# Patient Record
Sex: FEMALE | Race: WHITE | Employment: OTHER | URBAN - METROPOLITAN AREA
[De-identification: names, ages, dates, MRNs, and addresses within clinical notes are randomized per-mention and may not be internally consistent; named-entity substitution may affect disease eponyms.]

---

## 2017-01-11 ENCOUNTER — HOSPITAL ENCOUNTER (OUTPATIENT)
Dept: OTHER | Age: 82
Discharge: HOME | End: 2017-01-11
Attending: FAMILY MEDICINE | Admitting: FAMILY MEDICINE

## 2017-01-11 LAB
ANION GAP SERPL CALCULATED.3IONS-SCNC: 17 MMOL/L (ref 3–16)
BUN BLDV-MCNC: 17 MG/DL (ref 7–20)
CALCIUM SERPL-MCNC: 10.2 MG/DL (ref 8.3–10.6)
CHLORIDE BLD-SCNC: 99 MMOL/L (ref 99–110)
CO2: 25 MMOL/L (ref 21–32)
CREAT SERPL-MCNC: 0.8 MG/DL (ref 0.6–1.2)
GFR AFRICAN AMERICAN: >60
GFR NON-AFRICAN AMERICAN: >60
GLUCOSE BLD-MCNC: 110 MG/DL (ref 70–99)
POTASSIUM SERPL-SCNC: 5.1 MMOL/L (ref 3.5–5.1)
SODIUM BLD-SCNC: 141 MMOL/L (ref 136–145)

## 2017-01-13 ENCOUNTER — OFFICE VISIT (OUTPATIENT)
Dept: FAMILY MEDICINE CLINIC | Age: 82
End: 2017-01-13

## 2017-01-13 VITALS
SYSTOLIC BLOOD PRESSURE: 104 MMHG | OXYGEN SATURATION: 98 % | WEIGHT: 100.38 LBS | BODY MASS INDEX: 18.97 KG/M2 | DIASTOLIC BLOOD PRESSURE: 64 MMHG | RESPIRATION RATE: 12 BRPM | HEART RATE: 80 BPM

## 2017-01-13 DIAGNOSIS — I95.1 ORTHOSTATIC HYPOTENSION: Primary | ICD-10-CM

## 2017-01-13 DIAGNOSIS — E87.5 HYPERKALEMIA: ICD-10-CM

## 2017-01-13 PROCEDURE — 99213 OFFICE O/P EST LOW 20 MIN: CPT | Performed by: FAMILY MEDICINE

## 2017-01-19 ENCOUNTER — TELEPHONE (OUTPATIENT)
Dept: FAMILY MEDICINE CLINIC | Age: 82
End: 2017-01-19

## 2017-01-19 RX ORDER — MIDODRINE HYDROCHLORIDE 2.5 MG/1
2.5 TABLET ORAL 3 TIMES DAILY
Qty: 90 TABLET | Refills: 5 | Status: SHIPPED | OUTPATIENT
Start: 2017-01-19 | End: 2017-02-14

## 2017-01-23 ENCOUNTER — PROCEDURE VISIT (OUTPATIENT)
Dept: CARDIOLOGY CLINIC | Age: 82
End: 2017-01-23

## 2017-01-23 ENCOUNTER — OFFICE VISIT (OUTPATIENT)
Dept: CARDIOLOGY CLINIC | Age: 82
End: 2017-01-23

## 2017-01-23 VITALS
BODY MASS INDEX: 18.69 KG/M2 | DIASTOLIC BLOOD PRESSURE: 72 MMHG | SYSTOLIC BLOOD PRESSURE: 110 MMHG | HEART RATE: 54 BPM | OXYGEN SATURATION: 96 % | HEIGHT: 61 IN | WEIGHT: 99 LBS

## 2017-01-23 DIAGNOSIS — Z95.0 PACEMAKER: ICD-10-CM

## 2017-01-23 DIAGNOSIS — I95.1 ORTHOSTATIC HYPOTENSION: ICD-10-CM

## 2017-01-23 DIAGNOSIS — I48.0 PAROXYSMAL ATRIAL FIBRILLATION (HCC): ICD-10-CM

## 2017-01-23 DIAGNOSIS — I49.5 SINUS NODE DYSFUNCTION (HCC): ICD-10-CM

## 2017-01-23 DIAGNOSIS — R56.9 SEIZURE (HCC): ICD-10-CM

## 2017-01-23 DIAGNOSIS — R00.1 SYMPTOMATIC BRADYCARDIA: Primary | ICD-10-CM

## 2017-01-23 DIAGNOSIS — R55 SYNCOPE, UNSPECIFIED SYNCOPE TYPE: ICD-10-CM

## 2017-01-23 PROCEDURE — 93280 PM DEVICE PROGR EVAL DUAL: CPT | Performed by: INTERNAL MEDICINE

## 2017-01-23 PROCEDURE — 99214 OFFICE O/P EST MOD 30 MIN: CPT | Performed by: INTERNAL MEDICINE

## 2017-01-24 ENCOUNTER — OFFICE VISIT (OUTPATIENT)
Dept: FAMILY MEDICINE CLINIC | Age: 82
End: 2017-01-24

## 2017-01-24 ENCOUNTER — HOSPITAL ENCOUNTER (OUTPATIENT)
Dept: OTHER | Age: 82
Discharge: HOME | End: 2017-01-24
Attending: PHYSICIAN ASSISTANT | Admitting: PHYSICIAN ASSISTANT

## 2017-01-24 VITALS
WEIGHT: 99 LBS | BODY MASS INDEX: 18.71 KG/M2 | DIASTOLIC BLOOD PRESSURE: 80 MMHG | SYSTOLIC BLOOD PRESSURE: 120 MMHG | OXYGEN SATURATION: 95 % | TEMPERATURE: 97.1 F

## 2017-01-24 DIAGNOSIS — R51.9 NONINTRACTABLE EPISODIC HEADACHE, UNSPECIFIED HEADACHE TYPE: ICD-10-CM

## 2017-01-24 DIAGNOSIS — R42 DIZZINESS: ICD-10-CM

## 2017-01-24 DIAGNOSIS — I48.0 PAROXYSMAL ATRIAL FIBRILLATION (HCC): ICD-10-CM

## 2017-01-24 DIAGNOSIS — I95.9 HYPOTENSION, UNSPECIFIED HYPOTENSION TYPE: ICD-10-CM

## 2017-01-24 DIAGNOSIS — R41.3 SHORT-TERM MEMORY LOSS: ICD-10-CM

## 2017-01-24 DIAGNOSIS — J40 BRONCHITIS: Primary | ICD-10-CM

## 2017-01-24 LAB
KEPPRA DOSE AMT: ABNORMAL
KEPPRA: >100 UG/ML (ref 6–46)

## 2017-01-24 PROCEDURE — 99214 OFFICE O/P EST MOD 30 MIN: CPT | Performed by: PHYSICIAN ASSISTANT

## 2017-01-24 RX ORDER — CEPHALEXIN 500 MG/1
500 CAPSULE ORAL 3 TIMES DAILY
Qty: 30 CAPSULE | Refills: 0 | Status: SHIPPED | OUTPATIENT
Start: 2017-01-24 | End: 2017-02-14

## 2017-01-24 ASSESSMENT — ENCOUNTER SYMPTOMS
NAUSEA: 0
TROUBLE SWALLOWING: 0
SHORTNESS OF BREATH: 1
SINUS PRESSURE: 0
SORE THROAT: 0
COUGH: 1
DIARRHEA: 0
VOMITING: 0
RHINORRHEA: 0
WHEEZING: 0

## 2017-01-26 ENCOUNTER — TELEPHONE (OUTPATIENT)
Dept: NEUROLOGY | Age: 82
End: 2017-01-26

## 2017-01-27 ENCOUNTER — TELEPHONE (OUTPATIENT)
Dept: FAMILY MEDICINE CLINIC | Age: 82
End: 2017-01-27

## 2017-01-27 ENCOUNTER — OFFICE VISIT (OUTPATIENT)
Dept: NEUROLOGY | Age: 82
End: 2017-01-27

## 2017-01-27 ENCOUNTER — HOSPITAL ENCOUNTER (OUTPATIENT)
Dept: OTHER | Age: 82
Discharge: HOME | End: 2017-01-27
Attending: PSYCHIATRY & NEUROLOGY | Admitting: PSYCHIATRY & NEUROLOGY

## 2017-01-27 VITALS
HEART RATE: 85 BPM | DIASTOLIC BLOOD PRESSURE: 72 MMHG | SYSTOLIC BLOOD PRESSURE: 125 MMHG | WEIGHT: 101 LBS | BODY MASS INDEX: 19.07 KG/M2 | HEIGHT: 61 IN

## 2017-01-27 DIAGNOSIS — R56.9 PARTIAL SEIZURES (HCC): Primary | ICD-10-CM

## 2017-01-27 DIAGNOSIS — R89.9 ABNORMAL LABORATORY TEST RESULT: ICD-10-CM

## 2017-01-27 DIAGNOSIS — G44.52 NEW DAILY PERSISTENT HEADACHE: ICD-10-CM

## 2017-01-27 DIAGNOSIS — Z95.0 HISTORY OF PERMANENT CARDIAC PACEMAKER PLACEMENT: Primary | ICD-10-CM

## 2017-01-27 LAB
C-REACTIVE PROTEIN: 18 MG/L (ref 0–5.1)
KEPPRA DOSE AMT: NORMAL
KEPPRA: 7.2 UG/ML (ref 6–46)
SEDIMENTATION RATE, ERYTHROCYTE: 68 MM/HR (ref 0–30)

## 2017-01-27 PROCEDURE — 99214 OFFICE O/P EST MOD 30 MIN: CPT | Performed by: PSYCHIATRY & NEUROLOGY

## 2017-01-27 RX ORDER — ALPRAZOLAM 0.5 MG/1
TABLET ORAL
Qty: 2 TABLET | Refills: 0 | OUTPATIENT
Start: 2017-01-27 | End: 2017-02-14

## 2017-01-30 DIAGNOSIS — G40.119 PARTIAL EPILEPSY WITH INTRACTABLE EPILEPSY (HCC): Primary | ICD-10-CM

## 2017-02-02 ENCOUNTER — TELEPHONE (OUTPATIENT)
Dept: FAMILY MEDICINE CLINIC | Age: 82
End: 2017-02-02

## 2017-02-03 ENCOUNTER — HOSPITAL ENCOUNTER (OUTPATIENT)
Dept: OTHER | Age: 82
Discharge: HOME | End: 2017-02-03
Attending: PSYCHIATRY & NEUROLOGY | Admitting: PSYCHIATRY & NEUROLOGY

## 2017-02-03 ENCOUNTER — HOSPITAL ENCOUNTER (OUTPATIENT)
Dept: MRI IMAGING | Age: 82
Discharge: HOME | End: 2017-02-03
Attending: PHYSICIAN ASSISTANT | Admitting: PHYSICIAN ASSISTANT

## 2017-02-03 DIAGNOSIS — R51.9 NONINTRACTABLE EPISODIC HEADACHE, UNSPECIFIED HEADACHE TYPE: ICD-10-CM

## 2017-02-03 DIAGNOSIS — R41.3 SHORT-TERM MEMORY LOSS: ICD-10-CM

## 2017-02-03 DIAGNOSIS — R41.3 OTHER AMNESIA: ICD-10-CM

## 2017-02-03 DIAGNOSIS — R41.3 AMNESIA: ICD-10-CM

## 2017-02-03 DIAGNOSIS — R42 DIZZINESS: ICD-10-CM

## 2017-02-03 LAB
C-REACTIVE PROTEIN: 1.4 MG/L (ref 0–5.1)
SEDIMENTATION RATE, ERYTHROCYTE: 33 MM/HR (ref 0–30)

## 2017-02-06 ENCOUNTER — OFFICE VISIT (OUTPATIENT)
Dept: NEUROLOGY | Age: 82
End: 2017-02-06

## 2017-02-06 VITALS
DIASTOLIC BLOOD PRESSURE: 71 MMHG | BODY MASS INDEX: 19.07 KG/M2 | HEART RATE: 72 BPM | SYSTOLIC BLOOD PRESSURE: 131 MMHG | WEIGHT: 101 LBS | HEIGHT: 61 IN

## 2017-02-06 DIAGNOSIS — G40.119 PARTIAL EPILEPSY WITH INTRACTABLE EPILEPSY (HCC): Primary | ICD-10-CM

## 2017-02-06 PROCEDURE — 99213 OFFICE O/P EST LOW 20 MIN: CPT | Performed by: PSYCHIATRY & NEUROLOGY

## 2017-02-14 ENCOUNTER — OFFICE VISIT (OUTPATIENT)
Dept: FAMILY MEDICINE CLINIC | Age: 82
End: 2017-02-14

## 2017-02-14 VITALS — WEIGHT: 98 LBS | DIASTOLIC BLOOD PRESSURE: 70 MMHG | BODY MASS INDEX: 18.52 KG/M2 | SYSTOLIC BLOOD PRESSURE: 110 MMHG

## 2017-02-14 DIAGNOSIS — I95.1 ORTHOSTATIC HYPOTENSION: Primary | ICD-10-CM

## 2017-02-14 DIAGNOSIS — J40 BRONCHITIS: ICD-10-CM

## 2017-02-14 PROCEDURE — 99213 OFFICE O/P EST LOW 20 MIN: CPT | Performed by: PHYSICIAN ASSISTANT

## 2017-02-14 RX ORDER — MIDODRINE HYDROCHLORIDE 2.5 MG/1
2.5 TABLET ORAL DAILY
COMMUNITY
End: 2017-07-25

## 2017-02-14 ASSESSMENT — ENCOUNTER SYMPTOMS
COUGH: 0
BACK PAIN: 0
ABDOMINAL PAIN: 0
SHORTNESS OF BREATH: 0

## 2017-02-15 ENCOUNTER — HOSPITAL ENCOUNTER (OUTPATIENT)
Dept: OTHER | Age: 82
Discharge: HOME | End: 2017-02-15
Attending: PSYCHIATRY & NEUROLOGY | Admitting: PSYCHIATRY & NEUROLOGY

## 2017-02-15 DIAGNOSIS — G40.119 PARTIAL EPILEPSY WITH INTRACTABLE EPILEPSY (HCC): ICD-10-CM

## 2017-02-15 LAB
KEPPRA DOSE AMT: NORMAL
KEPPRA: 42.2 UG/ML (ref 6–46)

## 2017-04-07 ENCOUNTER — OFFICE VISIT (OUTPATIENT)
Dept: FAMILY MEDICINE CLINIC | Age: 82
End: 2017-04-07

## 2017-04-07 ENCOUNTER — HOSPITAL ENCOUNTER (OUTPATIENT)
Dept: OTHER | Age: 82
Discharge: HOME | End: 2017-04-07
Attending: FAMILY MEDICINE | Admitting: FAMILY MEDICINE

## 2017-04-07 VITALS
BODY MASS INDEX: 19.01 KG/M2 | WEIGHT: 100.6 LBS | HEART RATE: 75 BPM | OXYGEN SATURATION: 97 % | DIASTOLIC BLOOD PRESSURE: 80 MMHG | SYSTOLIC BLOOD PRESSURE: 118 MMHG

## 2017-04-07 DIAGNOSIS — I95.1 ORTHOSTATIC HYPOTENSION: Primary | ICD-10-CM

## 2017-04-07 DIAGNOSIS — E03.9 ACQUIRED HYPOTHYROIDISM: ICD-10-CM

## 2017-04-07 LAB
ANION GAP SERPL CALCULATED.3IONS-SCNC: 14 MMOL/L (ref 3–16)
BUN BLDV-MCNC: 15 MG/DL (ref 7–20)
CALCIUM SERPL-MCNC: 9.9 MG/DL (ref 8.3–10.6)
CHLORIDE BLD-SCNC: 100 MMOL/L (ref 99–110)
CO2: 26 MMOL/L (ref 21–32)
CORTISOL - AM: 17.7 UG/DL (ref 4.3–22.4)
CREAT SERPL-MCNC: 0.7 MG/DL (ref 0.6–1.2)
GFR AFRICAN AMERICAN: >60
GFR NON-AFRICAN AMERICAN: >60
GLUCOSE BLD-MCNC: 98 MG/DL (ref 70–99)
POTASSIUM SERPL-SCNC: 4.6 MMOL/L (ref 3.5–5.1)
SODIUM BLD-SCNC: 140 MMOL/L (ref 136–145)

## 2017-04-07 PROCEDURE — 99213 OFFICE O/P EST LOW 20 MIN: CPT | Performed by: FAMILY MEDICINE

## 2017-04-07 RX ORDER — LEVOTHYROXINE SODIUM 0.05 MG/1
TABLET ORAL
Qty: 90 TABLET | Refills: 3 | Status: SHIPPED | OUTPATIENT
Start: 2017-04-07 | End: 2018-06-28 | Stop reason: SDUPTHER

## 2017-05-08 ENCOUNTER — OFFICE VISIT (OUTPATIENT)
Dept: NEUROLOGY | Age: 82
End: 2017-05-08

## 2017-05-08 VITALS
DIASTOLIC BLOOD PRESSURE: 74 MMHG | BODY MASS INDEX: 19.11 KG/M2 | WEIGHT: 101.2 LBS | SYSTOLIC BLOOD PRESSURE: 135 MMHG | HEART RATE: 74 BPM | HEIGHT: 61 IN

## 2017-05-08 DIAGNOSIS — G40.119 PARTIAL EPILEPSY WITH INTRACTABLE EPILEPSY (HCC): Primary | ICD-10-CM

## 2017-05-08 PROCEDURE — 99213 OFFICE O/P EST LOW 20 MIN: CPT | Performed by: PSYCHIATRY & NEUROLOGY

## 2017-05-22 ENCOUNTER — OFFICE VISIT (OUTPATIENT)
Dept: FAMILY MEDICINE CLINIC | Age: 82
End: 2017-05-22

## 2017-05-22 VITALS
SYSTOLIC BLOOD PRESSURE: 132 MMHG | WEIGHT: 102.5 LBS | DIASTOLIC BLOOD PRESSURE: 78 MMHG | OXYGEN SATURATION: 98 % | BODY MASS INDEX: 19.37 KG/M2 | RESPIRATION RATE: 12 BRPM | HEART RATE: 50 BPM

## 2017-05-22 DIAGNOSIS — M79.605 BILATERAL LEG PAIN: ICD-10-CM

## 2017-05-22 DIAGNOSIS — M79.604 BILATERAL LEG PAIN: ICD-10-CM

## 2017-05-22 DIAGNOSIS — S16.1XXA CERVICAL STRAIN, INITIAL ENCOUNTER: Primary | ICD-10-CM

## 2017-05-22 PROCEDURE — 99213 OFFICE O/P EST LOW 20 MIN: CPT | Performed by: FAMILY MEDICINE

## 2017-05-22 RX ORDER — MELOXICAM 7.5 MG/1
7.5 TABLET ORAL DAILY PRN
Qty: 30 TABLET | Refills: 0 | Status: SHIPPED | OUTPATIENT
Start: 2017-05-22 | End: 2017-07-24 | Stop reason: ALTCHOICE

## 2017-05-25 ENCOUNTER — HOSPITAL ENCOUNTER (OUTPATIENT)
Dept: OTHER | Age: 82
Discharge: HOME | End: 2017-05-25
Attending: FAMILY MEDICINE | Admitting: FAMILY MEDICINE

## 2017-05-25 DIAGNOSIS — M79.604 BILATERAL LEG PAIN: ICD-10-CM

## 2017-05-25 DIAGNOSIS — M79.605 BILATERAL LEG PAIN: ICD-10-CM

## 2017-05-25 LAB
C-REACTIVE PROTEIN: <0.3 MG/L (ref 0–5.1)
SEDIMENTATION RATE, ERYTHROCYTE: 13 MM/HR (ref 0–30)

## 2017-06-12 ENCOUNTER — HOSPITAL ENCOUNTER (OUTPATIENT)
Dept: PHYSICAL THERAPY | Age: 82
Discharge: HOME | End: 2017-06-30
Attending: FAMILY MEDICINE | Admitting: FAMILY MEDICINE

## 2017-06-12 ASSESSMENT — PAIN SCALES - GENERAL: PAINLEVEL_OUTOF10: 1

## 2017-06-15 RX ORDER — LEVETIRACETAM 750 MG/1
TABLET ORAL
Qty: 360 TABLET | Refills: 3 | Status: SHIPPED | OUTPATIENT
Start: 2017-06-15 | End: 2017-07-25 | Stop reason: SDUPTHER

## 2017-07-18 ENCOUNTER — PROCEDURE VISIT (OUTPATIENT)
Dept: CARDIOLOGY CLINIC | Age: 82
End: 2017-07-18

## 2017-07-18 ENCOUNTER — OFFICE VISIT (OUTPATIENT)
Dept: CARDIOLOGY CLINIC | Age: 82
End: 2017-07-18

## 2017-07-18 VITALS
SYSTOLIC BLOOD PRESSURE: 110 MMHG | HEIGHT: 61 IN | BODY MASS INDEX: 18.69 KG/M2 | DIASTOLIC BLOOD PRESSURE: 70 MMHG | OXYGEN SATURATION: 97 % | HEART RATE: 84 BPM | WEIGHT: 99 LBS

## 2017-07-18 DIAGNOSIS — Z95.0 PACEMAKER: ICD-10-CM

## 2017-07-18 DIAGNOSIS — R56.9 SEIZURE (HCC): ICD-10-CM

## 2017-07-18 DIAGNOSIS — I48.0 PAROXYSMAL ATRIAL FIBRILLATION (HCC): Primary | ICD-10-CM

## 2017-07-18 DIAGNOSIS — I49.5 SINUS NODE DYSFUNCTION (HCC): ICD-10-CM

## 2017-07-18 DIAGNOSIS — R00.1 SYMPTOMATIC BRADYCARDIA: ICD-10-CM

## 2017-07-18 DIAGNOSIS — I95.1 ORTHOSTATIC HYPOTENSION: ICD-10-CM

## 2017-07-18 PROCEDURE — 99214 OFFICE O/P EST MOD 30 MIN: CPT | Performed by: INTERNAL MEDICINE

## 2017-07-18 PROCEDURE — 93280 PM DEVICE PROGR EVAL DUAL: CPT | Performed by: INTERNAL MEDICINE

## 2017-07-20 ENCOUNTER — PATIENT MESSAGE (OUTPATIENT)
Dept: FAMILY MEDICINE CLINIC | Age: 82
End: 2017-07-20

## 2017-07-24 ENCOUNTER — TELEPHONE (OUTPATIENT)
Dept: FAMILY MEDICINE CLINIC | Age: 82
End: 2017-07-24

## 2017-07-25 ENCOUNTER — OFFICE VISIT (OUTPATIENT)
Dept: FAMILY MEDICINE CLINIC | Age: 82
End: 2017-07-25

## 2017-07-25 VITALS
SYSTOLIC BLOOD PRESSURE: 122 MMHG | OXYGEN SATURATION: 98 % | DIASTOLIC BLOOD PRESSURE: 70 MMHG | HEART RATE: 98 BPM | BODY MASS INDEX: 18.48 KG/M2 | WEIGHT: 97.8 LBS

## 2017-07-25 DIAGNOSIS — I10 ESSENTIAL HYPERTENSION: ICD-10-CM

## 2017-07-25 DIAGNOSIS — D64.9 ANEMIA, UNSPECIFIED TYPE: ICD-10-CM

## 2017-07-25 DIAGNOSIS — R63.0 ANOREXIA: ICD-10-CM

## 2017-07-25 DIAGNOSIS — F32.9 REACTIVE DEPRESSION: ICD-10-CM

## 2017-07-25 DIAGNOSIS — I95.1 ORTHOSTATIC HYPOTENSION: Primary | ICD-10-CM

## 2017-07-25 DIAGNOSIS — G40.119 PARTIAL EPILEPSY WITH INTRACTABLE EPILEPSY (HCC): ICD-10-CM

## 2017-07-25 PROCEDURE — 99213 OFFICE O/P EST LOW 20 MIN: CPT | Performed by: FAMILY MEDICINE

## 2017-07-25 RX ORDER — LEVETIRACETAM 750 MG/1
750 TABLET ORAL 2 TIMES DAILY
Qty: 360 TABLET | Refills: 3
Start: 2017-07-25 | End: 2019-01-01 | Stop reason: DRUGHIGH

## 2017-07-25 RX ORDER — PAROXETINE 10 MG/1
10 TABLET, FILM COATED ORAL DAILY
Qty: 30 TABLET | Refills: 3 | Status: SHIPPED | OUTPATIENT
Start: 2017-07-25 | End: 2017-11-13 | Stop reason: ALTCHOICE

## 2017-07-25 RX ORDER — MIDODRINE HYDROCHLORIDE 2.5 MG/1
TABLET ORAL
Qty: 60 TABLET | Refills: 3 | Status: SHIPPED | OUTPATIENT
Start: 2017-07-25 | End: 2017-08-07 | Stop reason: SDUPTHER

## 2017-08-02 ENCOUNTER — HOSPITAL ENCOUNTER (OUTPATIENT)
Dept: OTHER | Age: 82
Discharge: HOME | End: 2017-08-02
Attending: FAMILY MEDICINE | Admitting: FAMILY MEDICINE

## 2017-08-02 DIAGNOSIS — G40.119 PARTIAL EPILEPSY WITH INTRACTABLE EPILEPSY (HCC): ICD-10-CM

## 2017-08-02 DIAGNOSIS — D64.9 ANEMIA, UNSPECIFIED TYPE: ICD-10-CM

## 2017-08-02 LAB
FOLATE: >20 NG/ML (ref 4.78–24.2)
KEPPRA DOSE AMT: NORMAL
KEPPRA: 22.9 UG/ML (ref 6–46)
VITAMIN B-12: 1067 PG/ML (ref 211–911)

## 2017-08-07 ENCOUNTER — OFFICE VISIT (OUTPATIENT)
Dept: FAMILY MEDICINE CLINIC | Age: 82
End: 2017-08-07

## 2017-08-07 VITALS
SYSTOLIC BLOOD PRESSURE: 148 MMHG | OXYGEN SATURATION: 97 % | WEIGHT: 98 LBS | HEART RATE: 86 BPM | BODY MASS INDEX: 18.52 KG/M2 | DIASTOLIC BLOOD PRESSURE: 82 MMHG

## 2017-08-07 DIAGNOSIS — I95.1 ORTHOSTATIC HYPOTENSION: ICD-10-CM

## 2017-08-07 DIAGNOSIS — R91.1 LUNG NODULE: ICD-10-CM

## 2017-08-07 DIAGNOSIS — M50.30 DDD (DEGENERATIVE DISC DISEASE), CERVICAL: Primary | ICD-10-CM

## 2017-08-07 PROCEDURE — 99213 OFFICE O/P EST LOW 20 MIN: CPT | Performed by: FAMILY MEDICINE

## 2017-08-07 RX ORDER — PREDNISONE 20 MG/1
40 TABLET ORAL DAILY
Qty: 14 TABLET | Refills: 0 | Status: SHIPPED | OUTPATIENT
Start: 2017-08-07 | End: 2017-08-14 | Stop reason: ALTCHOICE

## 2017-08-07 RX ORDER — MIDODRINE HYDROCHLORIDE 2.5 MG/1
2.5 TABLET ORAL 3 TIMES DAILY
Qty: 90 TABLET | Refills: 3 | Status: SHIPPED | OUTPATIENT
Start: 2017-08-07 | End: 2018-08-21 | Stop reason: SDUPTHER

## 2017-08-14 ENCOUNTER — OFFICE VISIT (OUTPATIENT)
Dept: FAMILY MEDICINE CLINIC | Age: 82
End: 2017-08-14

## 2017-08-14 VITALS
HEART RATE: 80 BPM | SYSTOLIC BLOOD PRESSURE: 142 MMHG | BODY MASS INDEX: 18.52 KG/M2 | OXYGEN SATURATION: 98 % | WEIGHT: 98 LBS | DIASTOLIC BLOOD PRESSURE: 90 MMHG

## 2017-08-14 DIAGNOSIS — S40.021A HEMATOMA OF ARM, RIGHT, INITIAL ENCOUNTER: ICD-10-CM

## 2017-08-14 DIAGNOSIS — R35.0 FREQUENT URINATION: Primary | ICD-10-CM

## 2017-08-14 DIAGNOSIS — I95.1 ORTHOSTATIC HYPOTENSION: ICD-10-CM

## 2017-08-14 LAB
BACTERIA URINE, POC: 0
BILIRUBIN URINE: 0 MG/DL
BLOOD, URINE: POSITIVE
CASTS URINE, POC: 0
CLARITY: CLEAR
COLOR: YELLOW
CRYSTALS URINE, POC: 0
EPI CELLS URINE, POC: 0
GLUCOSE URINE: NEGATIVE
KETONES, URINE: NEGATIVE
LEUKOCYTE EST, POC: NORMAL
NITRITE, URINE: NEGATIVE
PH UA: 6 (ref 4.5–8)
PROTEIN UA: NEGATIVE
RBC URINE, POC: NORMAL
SPECIFIC GRAVITY UA: 1 (ref 1–1.03)
UROBILINOGEN, URINE: NORMAL
WBC URINE, POC: NORMAL
YEAST URINE, POC: 0

## 2017-08-14 PROCEDURE — 99213 OFFICE O/P EST LOW 20 MIN: CPT | Performed by: FAMILY MEDICINE

## 2017-08-14 PROCEDURE — 81000 URINALYSIS NONAUTO W/SCOPE: CPT | Performed by: FAMILY MEDICINE

## 2017-08-16 LAB — URINE CULTURE, ROUTINE: NORMAL

## 2017-08-18 ENCOUNTER — HOSPITAL ENCOUNTER (OUTPATIENT)
Dept: CT IMAGING | Age: 82
Discharge: HOME | End: 2017-08-18
Attending: FAMILY MEDICINE | Admitting: FAMILY MEDICINE

## 2017-08-18 ENCOUNTER — TELEPHONE (OUTPATIENT)
Dept: FAMILY MEDICINE CLINIC | Age: 82
End: 2017-08-18

## 2017-08-18 DIAGNOSIS — R91.1 LUNG NODULE: ICD-10-CM

## 2017-08-18 DIAGNOSIS — R91.1 SOLITARY PULMONARY NODULE: ICD-10-CM

## 2017-08-18 RX ORDER — LEVOFLOXACIN 500 MG/1
500 TABLET, FILM COATED ORAL DAILY
Qty: 10 TABLET | Refills: 0 | Status: ON HOLD | OUTPATIENT
Start: 2017-08-18 | End: 2017-08-25 | Stop reason: HOSPADM

## 2017-08-23 ENCOUNTER — TELEPHONE (OUTPATIENT)
Dept: FAMILY MEDICINE CLINIC | Age: 82
End: 2017-08-23

## 2017-08-29 PROBLEM — R13.10 DYSPHAGIA: Status: ACTIVE | Noted: 2017-08-29

## 2017-09-01 ENCOUNTER — OFFICE VISIT (OUTPATIENT)
Dept: FAMILY MEDICINE CLINIC | Age: 82
End: 2017-09-01

## 2017-09-01 VITALS
OXYGEN SATURATION: 98 % | SYSTOLIC BLOOD PRESSURE: 150 MMHG | HEART RATE: 75 BPM | DIASTOLIC BLOOD PRESSURE: 98 MMHG | BODY MASS INDEX: 18.33 KG/M2 | WEIGHT: 97 LBS

## 2017-09-01 DIAGNOSIS — J18.9 PNEUMONIA DUE TO ORGANISM: Primary | ICD-10-CM

## 2017-09-01 DIAGNOSIS — R19.5 LOOSE STOOLS: ICD-10-CM

## 2017-09-01 DIAGNOSIS — J69.0 ASPIRATION PNEUMONIA OF RIGHT LUNG, UNSPECIFIED ASPIRATION PNEUMONIA TYPE, UNSPECIFIED PART OF LUNG (HCC): ICD-10-CM

## 2017-09-01 DIAGNOSIS — R13.12 OROPHARYNGEAL DYSPHAGIA: ICD-10-CM

## 2017-09-01 DIAGNOSIS — Z23 NEEDS FLU SHOT: ICD-10-CM

## 2017-09-01 DIAGNOSIS — I95.1 ORTHOSTATIC HYPOTENSION: ICD-10-CM

## 2017-09-01 PROCEDURE — 99214 OFFICE O/P EST MOD 30 MIN: CPT | Performed by: FAMILY MEDICINE

## 2017-09-01 PROCEDURE — G0008 ADMIN INFLUENZA VIRUS VAC: HCPCS | Performed by: FAMILY MEDICINE

## 2017-09-01 PROCEDURE — 90662 IIV NO PRSV INCREASED AG IM: CPT | Performed by: FAMILY MEDICINE

## 2017-09-11 ENCOUNTER — HOSPITAL ENCOUNTER (OUTPATIENT)
Dept: SPEECH THERAPY | Age: 82
Discharge: HOME | End: 2017-09-30
Admitting: FAMILY MEDICINE

## 2017-09-11 ENCOUNTER — OFFICE VISIT (OUTPATIENT)
Dept: FAMILY MEDICINE CLINIC | Age: 82
End: 2017-09-11

## 2017-09-11 VITALS
HEART RATE: 79 BPM | DIASTOLIC BLOOD PRESSURE: 80 MMHG | SYSTOLIC BLOOD PRESSURE: 132 MMHG | OXYGEN SATURATION: 97 % | WEIGHT: 98 LBS | BODY MASS INDEX: 18.52 KG/M2

## 2017-09-11 DIAGNOSIS — I95.1 ORTHOSTATIC HYPOTENSION: ICD-10-CM

## 2017-09-11 DIAGNOSIS — J36 ABSCESS, PERITONSILLAR: Primary | ICD-10-CM

## 2017-09-11 PROCEDURE — 99213 OFFICE O/P EST LOW 20 MIN: CPT | Performed by: FAMILY MEDICINE

## 2017-09-11 NOTE — PROGRESS NOTES
Speech Language Pathology  Evaluation Attempt  Saúl Casillas   9/7/1931     Patient scheduled for outpatient speech therapy evaluation this date. Pt called to cancel appointment today due to recent ER visit that revealed a cyst down in her throat that needs to be seem by ENT prior to completing speech therapy evaluation. Thank you,    Cyndi JAMIL CCC-SLP S.P. [id number]  Speech-Language Pathologist

## 2017-09-12 ENCOUNTER — OFFICE VISIT (OUTPATIENT)
Dept: ENT CLINIC | Age: 82
End: 2017-09-12

## 2017-09-12 VITALS
SYSTOLIC BLOOD PRESSURE: 138 MMHG | WEIGHT: 98 LBS | BODY MASS INDEX: 16.73 KG/M2 | DIASTOLIC BLOOD PRESSURE: 74 MMHG | TEMPERATURE: 97.3 F | HEART RATE: 78 BPM | HEIGHT: 64 IN

## 2017-09-12 DIAGNOSIS — R13.12 OROPHARYNGEAL DYSPHAGIA: Primary | ICD-10-CM

## 2017-09-12 PROCEDURE — 99203 OFFICE O/P NEW LOW 30 MIN: CPT | Performed by: OTOLARYNGOLOGY

## 2017-09-14 ENCOUNTER — TELEPHONE (OUTPATIENT)
Dept: FAMILY MEDICINE CLINIC | Age: 82
End: 2017-09-14

## 2017-10-02 ENCOUNTER — OFFICE VISIT (OUTPATIENT)
Dept: PULMONOLOGY | Age: 82
End: 2017-10-02

## 2017-10-02 ENCOUNTER — TELEPHONE (OUTPATIENT)
Dept: ENT CLINIC | Age: 82
End: 2017-10-02

## 2017-10-02 VITALS
OXYGEN SATURATION: 99 % | BODY MASS INDEX: 18.12 KG/M2 | DIASTOLIC BLOOD PRESSURE: 67 MMHG | WEIGHT: 96 LBS | RESPIRATION RATE: 18 BRPM | SYSTOLIC BLOOD PRESSURE: 112 MMHG | TEMPERATURE: 97.6 F | HEART RATE: 93 BPM | HEIGHT: 61 IN

## 2017-10-02 DIAGNOSIS — R93.89 ABNORMAL CT SCAN, CHEST: ICD-10-CM

## 2017-10-02 DIAGNOSIS — J44.9 COPD, MILD (HCC): ICD-10-CM

## 2017-10-02 DIAGNOSIS — J35.8 TONSILLAR MASS: Primary | ICD-10-CM

## 2017-10-02 DIAGNOSIS — R59.1 LYMPHADENOPATHY OF HEAD AND NECK: ICD-10-CM

## 2017-10-02 DIAGNOSIS — Z09 HOSPITAL DISCHARGE FOLLOW-UP: Primary | ICD-10-CM

## 2017-10-02 PROCEDURE — 99214 OFFICE O/P EST MOD 30 MIN: CPT | Performed by: INTERNAL MEDICINE

## 2017-10-02 NOTE — PROGRESS NOTES
HPI: HFU, COPD  Patient is here for [facility] visit  She was seen for abnormal CT chest and respiratory distress  Her CT chest was read as:  EXAMINATION:   CT OF THE CHEST WITHOUT CONTRAST 8/18/2017 2:57 pm       TECHNIQUE:   CT of the chest was performed without the administration of intravenous   contrast. Multiplanar reformatted images are provided for review. Dose   modulation, iterative reconstruction, and/or weight based adjustment of the   mA/kV was utilized to reduce the radiation dose to as low as reasonably   achievable.       COMPARISON:   CT scan 05/31/2016       HISTORY:   ORDERING PHYSICIAN PROVIDED HISTORY: Lung nodule   TECHNOLOGIST PROVIDED HISTORY:   Technologist Provided Reason for Exam: lung nodule f/u   Acuity: Unknown   Type of Encounter: Subsequent/Follow-up       FINDINGS:   Mediastinum: Limited evaluation without IV contrast.  Dual cardiac leads are   noted, without significant change compared x-ray 07/24/2017.  No significant   pericardial effusion appreciated.       Lungs/pleura: Pronounced biapical fibrotic changes are noted posteriorly. Stable 7 mm size noncalcified nodule anterior left upper lobe.  Suspected   patchy mixed attenuating airspace disease medial segment right middle lobe   measures 2.6 x 3.7 cm.  Stable reticulonodular densities right lung base   laterally.  Stable reticulonodular opacities anterior right upper lobe.  No   pleural effusion detected.  Stable mild upper lobe and right middle lobe   bronchiectasis.       Upper Abdomen: Limited evaluation without acute finding.       Soft Tissues/Bones:  Moderate diffuse degenerative disc disease.           Impression   Previously described 7 mm left upper lobe noncalcified nodule appears stable.       There are extensive upper lobe fibrotic changes that do not appear   significantly changed subtle likely postinflammatory.       New since prior study, suspected 3.7 cm sized area of airspace disease medial   segment right middle lobe Constitutional: Negative. Negative for fever, chills, diaphoresis, activity change, appetite change, fatigue and unexpected weight change. HENT: Negative. Negative for hearing loss, ear pain, nosebleeds, congestion, facial swelling, rhinorrhea, sneezing, neck pain, neck stiffness, postnasal drip, sinus pressure and ear discharge. Eyes: Negative. Negative for photophobia, pain, discharge, redness, itching and visual disturbance. Respiratory: As per HPI  Cardiovascular: Negative. Negative for chest pain, palpitations and leg swelling. Gastrointestinal: Negative. Negative for abdominal pain, blood in stool, abdominal distention and anal bleeding. Genitourinary: Negative. Negative for dysuria, urgency, frequency, hematuria, decreased urine volume, enuresis and difficulty urinating. Musculoskeletal: Negative. Negative for myalgias, back pain, joint swelling, arthralgias and gait problem. Skin: Negative. Negative for color change and pallor. Neurological: Negative. Negative for dizziness, tremors, seizures, syncope, facial asymmetry, speech difficulty, weakness, light-headedness, numbness and headaches. Hematological: Negative. Negative for adenopathy. Psychiatric/Behavioral: Negative. Negative for hallucinations, behavioral problems, confusion and agitation. The patient is not nervous/anxious and is not hyperactive. Blood pressure 112/67, pulse 93, temperature 97.6 °F (36.4 °C), temperature source Oral, resp. rate 18, height 5' 1\" (1.549 m), weight 96 lb (43.5 kg), SpO2 99 %, not currently breastfeeding. Physical Exam   Constitutional: Oriented, well-developed and well-nourished. No distress. HENT:   Head: Normocephalic and atraumatic. Mouth/Throat: Oropharynx is clear and moist. No oropharyngeal exudate. Eyes: Conjunctivae and extraocular motions are normal. Pupils are equal, round, and reactive to light. Right eye exhibits no discharge. Left eye exhibits no discharge.

## 2017-10-02 NOTE — TELEPHONE ENCOUNTER
Patient's daughter called and scheduled a follow-up appt for her for 10/27/2017. She said she is supposed to have an ultrasound done before the appt but she doesn't have the order. I checked her chart and there is no order in [facility]. I told her that I would let Dr. Vu Bee know and we can give her a call back about the ultrasound. Her number is [phone].

## 2017-10-02 NOTE — COMMUNICATION BODY
Assessment:    Assessment:    1. Hospital discharge follow-up     2. COPD, mild ([address]     3. Abnormal CT scan, chest           Plan:    Plan:  1. I discussed with patient the above diagnosis in details and reviewed his recent hospital stay and related test results  2. I reviewed her CT chest and recent CT neck  3. Her pneumonia probably related to aspiration due to tonsil mass  4. She is scheduled to F/U with Dr. Vu Bee later this month and possible biopsy if not improved  5. I recommended F/U CT chest as well in  as 3 months F/U  6.  Continue Spiriva daily and Albuterol PRN  7. RTC in 2-3 months

## 2017-10-12 ENCOUNTER — HOSPITAL ENCOUNTER (OUTPATIENT)
Dept: CT IMAGING | Age: 82
Discharge: HOME | End: 2017-10-12
Attending: INTERNAL MEDICINE | Admitting: FAMILY MEDICINE

## 2017-10-12 DIAGNOSIS — R22.0 LOCALIZED SWELLING, MASS, AND LUMP OF HEAD: ICD-10-CM

## 2017-10-12 DIAGNOSIS — J35.8 TONSILLAR MASS: ICD-10-CM

## 2017-10-12 DIAGNOSIS — R59.1 LYMPHADENOPATHY OF HEAD AND NECK: ICD-10-CM

## 2017-10-23 ENCOUNTER — TELEPHONE (OUTPATIENT)
Dept: CARDIOLOGY CLINIC | Age: 82
End: 2017-10-23

## 2017-10-23 PROBLEM — R07.9 CHEST PAIN: Status: ACTIVE | Noted: 2017-10-23

## 2017-10-31 PROBLEM — A04.72 C. DIFFICILE COLITIS: Status: ACTIVE | Noted: 2017-10-31

## 2017-10-31 PROBLEM — I95.1 ORTHOSTATIC HYPOTENSION: Status: ACTIVE | Noted: 2017-10-31

## 2017-11-02 ENCOUNTER — CARE COORDINATION (OUTPATIENT)
Dept: CASE MANAGEMENT | Age: 82
End: 2017-11-02

## 2017-11-02 ENCOUNTER — TELEPHONE (OUTPATIENT)
Dept: FAMILY MEDICINE CLINIC | Age: 82
End: 2017-11-02

## 2017-11-02 NOTE — CARE COORDINATION
[address] Transitions Initial Follow Up Call    Call within 2 business days of discharge: Yes    Patient: Saúl Casillas Patient : 1931   MRN: [id number]  Reason for Admission: Chest Pain, Fatigue,     Discharge Date: 17 RARS: [facility] Risk Score: 24.75     Spoke with: daughter Betty Rios: [id number] services provided:  Obtained and reviewed discharge summary and/or continuity of care documents    Inpatient Assessment  Care Transitions 24 Hour Call    Do you have any ongoing symptoms?:  Yes  Patient-reported symptoms:  Pain (Comment: left lower back)  Interventions for patient-reported symptoms:  Notified Home Care (Comment: Home Care RN is present now)  Do you have a copy of your discharge instructions?:  Yes  Do you have all of your prescriptions and are they filled?:  Yes  Have you been contacted by a [address]?:  No  Have you scheduled your follow up appointment?:  Yes  How are you going to get to your appointment?:  Car - family or friend to transport  Were you discharged with any Home Care or Post Acute Services:  Yes  Post Acute Services:  Home Health  Patient DME:  Anya stinson  Do you have support at home?:  Child  Do you feel like you have everything you need to keep you well at home?:  Yes  Are you an active caregiver in your home?:  No  Care Transitions Interventions  No Identified Needs       RN from home care is present and completing assessment and medication review, PT/OT scheduled for tomorrow. Is having Left lower back pain, RN aware.  Has follow up scheduled with PCP on ,     Follow Up  Future Appointments  Date Time Provider Vikki Alcala   2017 1:00 PM MHF CT RM 1 [facility] CT [facility]   2017 10:40 AM Jaren De Leon MD [facility]   2017 1:10 PM Sandeep Clifton MD FF NEURO [facility]   11/10/2017 11:30 AM Kendall Lala NP FF Cardio [facility]   2017 3:40 PM Marquis Morales MD FF ENT [facility]       Cherylene Simon

## 2017-11-03 ENCOUNTER — TELEPHONE (OUTPATIENT)
Dept: FAMILY MEDICINE CLINIC | Age: 82
End: 2017-11-03

## 2017-11-03 NOTE — TELEPHONE ENCOUNTER
Pito Wylie with [facility] called to let Dr. Lindy Ormond know that the patient canceled her appointment for Physical Therapy evaluation due to not feeling well. They are rescheduling for sometime next week.     Elvira Fraga can be reached at [phone]

## 2017-11-03 NOTE — TELEPHONE ENCOUNTER
Rachel/Simba states pt accessment concern about BP running low; 92/58 sitting; dropping sit to stand 77/54 standing 126/58 after walking; sat for about 2 minutes went back to the 90's    Back and abdomen pain - abdomen is not hard; new pain this morning in her lower back around to the lower abdomen     Took Tylenol and heating pad this morning and it has helped a little;    Pt's daughter would like to be call directly - call the house as daughter is staying with her

## 2017-11-06 ENCOUNTER — OFFICE VISIT (OUTPATIENT)
Dept: FAMILY MEDICINE CLINIC | Age: 82
End: 2017-11-06

## 2017-11-06 VITALS
WEIGHT: 97.6 LBS | HEART RATE: 85 BPM | DIASTOLIC BLOOD PRESSURE: 56 MMHG | OXYGEN SATURATION: 96 % | SYSTOLIC BLOOD PRESSURE: 86 MMHG | BODY MASS INDEX: 18.44 KG/M2

## 2017-11-06 DIAGNOSIS — I95.1 ORTHOSTATIC HYPOTENSION: ICD-10-CM

## 2017-11-06 DIAGNOSIS — R42 DIZZINESS: ICD-10-CM

## 2017-11-06 DIAGNOSIS — M54.50 ACUTE LOW BACK PAIN WITHOUT SCIATICA, UNSPECIFIED BACK PAIN LATERALITY: ICD-10-CM

## 2017-11-06 DIAGNOSIS — R13.12 OROPHARYNGEAL DYSPHAGIA: ICD-10-CM

## 2017-11-06 DIAGNOSIS — R53.1 WEAKNESS GENERALIZED: Primary | ICD-10-CM

## 2017-11-06 DIAGNOSIS — A04.72 C. DIFFICILE COLITIS: ICD-10-CM

## 2017-11-06 PROCEDURE — 99214 OFFICE O/P EST MOD 30 MIN: CPT | Performed by: PHYSICIAN ASSISTANT

## 2017-11-06 ASSESSMENT — ENCOUNTER SYMPTOMS
VOICE CHANGE: 0
ABDOMINAL PAIN: 0
CONSTIPATION: 0
DIARRHEA: 0
BACK PAIN: 1
TROUBLE SWALLOWING: 0
SORE THROAT: 0
SHORTNESS OF BREATH: 0
EYE PAIN: 0
COUGH: 0
CHEST TIGHTNESS: 0
VOMITING: 0

## 2017-11-06 ASSESSMENT — PATIENT HEALTH QUESTIONNAIRE - PHQ9
1. LITTLE INTEREST OR PLEASURE IN DOING THINGS: 0
2. FEELING DOWN, DEPRESSED OR HOPELESS: 1
SUM OF ALL RESPONSES TO PHQ9 QUESTIONS 1 & 2: 1
SUM OF ALL RESPONSES TO PHQ QUESTIONS 1-9: 1

## 2017-11-06 NOTE — PROGRESS NOTES
Subjective:      Patient ID: Ellen Cueto is a 80 y.o. female. HPI  Patient is here today for a hospital follow up. She was in [facility] for 12 days. Before calling the squad, she was experiencing low BP, confusion, tremors. Daughter called 911. Then while waiting for squad, she started with chest pain. She was found to be in Atrial Fibrillation. She has had this in the past but never like this. She has been on the Xarelto for a year now, since pacemaker was put in. While she was in the hospital, she tested positive for C.difficile. She was on multiple antibiotics for esophageal abscess and pneumonia. While she was in the hospital, she developed pain in both popliteal areas, right side first, then left. Dopplers were negative. She's barely eating or drinking anything. They changed her Midodrine to hold if BP is higher than 150/90. She has one more day of Vancomycin. Stool is getting more formed but not completely solid yet. She has a follow up with Dr. Domingo Marie this month. Today, she has a follow up with urology due to hematuria since Friday. She has severe left flank pain, no radiating pain. The pain has improved since this morning. She has had severe abdominal pain and bloating over the weekend. She says when she urinates, her urine is hot. She says she has had left sided neck pain. Home nurse came yesterday and BP was 70/50, advised to go to ER but did not. Saturday morning she was getting off toilet to go back to bed at 3am and she passed out for a few seconds. Then Saturday afternoon, it happened again with same circumstances. Both times just for urinating, no BM's. No recent fevers. Review of Systems   Constitutional: Positive for appetite change and fatigue. Negative for unexpected weight change. HENT: Negative for congestion, dental problem, ear pain, hearing loss, sore throat, trouble swallowing and voice change. Eyes: Negative for pain and visual disturbance. person, place, and time. She has normal reflexes. Skin: There is pallor. Assessment:      Ovi Cantu was seen today for follow-up from hospital.    Diagnoses and all orders for this visit:    Weakness generalized    Orthostatic hypotension    Acute low back pain without sciatica, unspecified back pain laterality    Dizziness    Oropharyngeal dysphagia    C. difficile colitis               Plan:      BP stayed low in office, she didn't feel well at all, sent back to ER.

## 2017-11-07 ENCOUNTER — TELEPHONE (OUTPATIENT)
Dept: FAMILY MEDICINE CLINIC | Age: 82
End: 2017-11-07

## 2017-11-07 ENCOUNTER — CARE COORDINATION (OUTPATIENT)
Dept: CASE MANAGEMENT | Age: 82
End: 2017-11-07

## 2017-11-07 NOTE — CARE COORDINATION
[facility] Transitions Follow Up Call    2017    Patient: Rissa Cunningham  Patient : 1931   MRN: [id number]  Reason for Admission:  Chest Pain, Fatigue  Discharge Date: 17 RARS: Risk Score: 24.75       Spoke with: pt's daughter      Care Transitions Subsequent and Final Call    Subsequent and Final Calls  Do you have any ongoing symptoms?:  No  Have your medications changed?:  No  Do you have any questions related to your medications?:  No  Do you currently have any active services?:  Yes  Are you currently active with any services?:  Home Health  Do you have any needs or concerns that I can assist you with?:  No  Identified Barriers:  None  Care Transitions Interventions  No Identified Needs  Other Interventions:          Pt's daughter states mom is doing pretty well, went to MD appt yesterday and was sent to ER for fluids, pt had low BP and appeared dehydrated and fatigued at appt. She was given IV fluids along with Ultram and Zofran prescriptions and d/c'd. [facility]. nurse will be out this afternoon. Agreed to more CTC f/u calls.     Follow Up  Future Appointments  Date Time Provider Vikki Alcala   2017 1:10 PM Leyla Urbina MD  NEURO [facility]   11/10/2017 11:30 AM Mitch Liu NP  Cardio [facility]   2017 11:10 AM Maldonado Rosales MD [facility]   2017 3:40 PM Shadia Card MD  ENT [facility]       Dutch Sanchez RN

## 2017-11-08 NOTE — TELEPHONE ENCOUNTER
Nickie Ma/Simba - returned call     tongue is sore and cherry red and pt daughter will have pt use warm salt water; possible thrush per Ferol Fela; yesterday took the last of the Vancomycin.        Please call and advise

## 2017-11-10 ENCOUNTER — OFFICE VISIT (OUTPATIENT)
Dept: CARDIOLOGY CLINIC | Age: 82
End: 2017-11-10

## 2017-11-10 ENCOUNTER — CARE COORDINATION (OUTPATIENT)
Dept: CASE MANAGEMENT | Age: 82
End: 2017-11-10

## 2017-11-10 VITALS
BODY MASS INDEX: 18 KG/M2 | DIASTOLIC BLOOD PRESSURE: 82 MMHG | WEIGHT: 95.25 LBS | SYSTOLIC BLOOD PRESSURE: 132 MMHG | HEART RATE: 73 BPM

## 2017-11-10 DIAGNOSIS — Z95.0 PACEMAKER: ICD-10-CM

## 2017-11-10 DIAGNOSIS — I48.0 PAROXYSMAL ATRIAL FIBRILLATION (HCC): Primary | ICD-10-CM

## 2017-11-10 DIAGNOSIS — I10 ESSENTIAL HYPERTENSION: ICD-10-CM

## 2017-11-10 DIAGNOSIS — I49.5 SINUS NODE DYSFUNCTION (HCC): ICD-10-CM

## 2017-11-10 PROCEDURE — 99213 OFFICE O/P EST LOW 20 MIN: CPT | Performed by: NURSE PRACTITIONER

## 2017-11-10 PROCEDURE — 93000 ELECTROCARDIOGRAM COMPLETE: CPT | Performed by: NURSE PRACTITIONER

## 2017-11-10 NOTE — PROGRESS NOTES
[address] 81   Electrophysiology   Date: 11/10/2017     CC: atrial fibrillation   HPI: I had the privilege of seeing Lalitha Solis in follow up for atrial fibrillation, symptomatic bradycardia s/p PPM. She has history of recurrent syncope on 5/30/2016 and 7/21/16. She also has had recurrent episodes of dizzy spells and lightheadedness. She has a history of prior seizures (since head injury in 2005). ILR was implanted on 8/11/16 and showed multiple pauses of up to 5 seconds. On 9/29/16, she underwent dual chamber (MRI compatible) pacemaker. Interval History:   She was admitted in October with fatigue and dizziness, found to have C. Diff. During admission AF RVR with chest pain during rapid HR. She was started on Flecainide and Xarelto. Chest pain was evaluated with stress test and CTA, both negative. She was seen in the ED for hypotension and UTI, treated with IVF and Abx. Today she reports fatigue is slowly improving. Still requires rest breaks during ADLs. HR is elevated with activity, but denies palpitation at rest.       Recent testing and relevant Cardiac history:  ECG: sinus   Echo 8/24/17:  -Global ejection fraction is increased (hyperdynamic) and estimated from 70% to 75%.   -No regional wall motion abnormalities are noted. -Thickened mitral valve without evidence of stenosis. Trace mitral regurgitation.   -There is trivial tricuspid regurgitation with RVSP estimated at 33 mmHg.   -Pacemaker wire noted in right heart. -E/e'= 14.3 . Myoview: 7/25/17   Normal myocardial perfusion study.     Normal LV size and systolic function   Device present: Medtronic dual chamber PPM  Anticoagulation: Xarelto                Past Medical History:   Diagnosis Date    A-fib [address] [address] 2016    on xarelto    Clostridium difficile diarrhea 10/21/2017    COPD (chronic obstructive pulmonary disease) (HCC)     Dysplasia of cervix     SUMI 3    PE (pulmonary embolism)     post surgical    Seizures ([address] 2005    from fall 2005,laast [id number]    Thyroid disease         Past Surgical History:   Procedure Laterality Date    COLONOSCOPY  04/2013    normal except diverticulosis    PACEMAKER PLACEMENT Left     MRI compatible    ROTATOR CUFF REPAIR Right     ROLAND AND BSO  9/2010    cervical cancer, Dr. Alexander Calderon GASTROINTESTINAL ENDOSCOPY  02/2016    normal       Allergies: Allergies   Allergen Reactions    Cymbalta [Duloxetine Hcl] Other (See Comments)     Sleepy dizzy    Sulfa Antibiotics      Doesn't remember       Social History:  Reviewed. reports that she has never smoked. She has never used smokeless tobacco. She reports that she does not drink alcohol or use drugs. Family History:  Reviewed. family history includes Breast Cancer in her maternal aunt; Cancer in her daughter; High Blood Pressure in her mother; Lung Cancer in her brother; Other in her father; Parkinsonism in her mother; Stroke in her mother. Review of System:    · General ROS: negative for - chills, fever   · Psychological ROS: negative for - anxiety or depression  · Ophthalmic ROS: negative for - eye pain or loss of vision  · ENT ROS: negative for - headaches, sore throat   · Allergy and Immunology ROS: negative for - hives  · Hematological and Lymphatic ROS: negative for - bleeding problems, blood clots, bruising or jaundice  · Endocrine ROS: negative for - skin changes, temperature intolerance or unexpected weight changes  · Respiratory ROS: negative for - cough, sputum, wheezing  · Cardiovascular ROS: Per HPI.    · Gastrointestinal ROS: negative for - abdominal pain, diarrhea, nausea/vomiting, bleeding   · Genito-Urinary ROS: negative for - dysuria or incontinence  · Musculoskeletal ROS: negative for - joint swelling   · Neurological ROS: negative for - confusion, numbness/tingling, seizures, weakness  · Dermatological ROS: negative for - rash    Physical Examination:  Vitals:    11/10/17 1217   BP: 132/82   Pulse: 73       Constitutional and General Appearance: Warm and dry, no apparent distress, normal coloration  HEENT:  Normocephalic, atraumatic  Respiratory:  · Normal excursion and expansion without use of accessory muscles  · Resp Auscultation: Normal breath sounds without dullness  Cardiovascular:  · The apical impulses not displaced  · Heart tones are crisp and normal  · JVP less than 8 cm H2O  · Regular rate and rhythm, S1, S2  · Peripheral pulses are symmetrical and full  · There is no clubbing, cyanosis of the extremities. · No edema  · Pedal Pulses: 2+ and equal   Abdomen:  · No masses or tenderness  · Liver/Spleen: No Abnormalities Noted  Neurological/Psychiatric:  · Alert and oriented in all spheres  · Moves all extremities well  · Exhibits normal gait balance and coordination  · No abnormalities of mood, affect, memory, mentation, or behavior are noted    Labs:  Reviewed.      Medication:  Current Outpatient Prescriptions   Medication Sig Dispense Refill    Cranberry 1000 MG CAPS Take by mouth      nystatin (MYCOSTATIN) 305340 UNIT/ML suspension Take 5 mLs by mouth 4 times daily for 7 days 140 mL 0    traMADol (ULTRAM) 50 MG tablet Take 1 tablet by mouth every 6 hours as needed for Pain 20 tablet 0    flecainide (TAMBOCOR) 50 MG tablet Take 1 tablet by mouth every 12 hours 60 tablet 3    ferrous sulfate 325 (65 Fe) MG tablet Take 1 tablet by mouth daily (with breakfast) 30 tablet 3    tiotropium (SPIRIVA) 18 MCG inhalation capsule Inhale 1 capsule into the lungs daily 30 capsule 3    albuterol sulfate  (90 Base) MCG/ACT inhaler Inhale 2 puffs into the lungs every 6 hours as needed for Wheezing 1 Inhaler 3    midodrine (PROAMATINE) 2.5 MG tablet Take 1 tablet by mouth 3 times daily 90 tablet 3    levETIRAcetam (KEPPRA) 750 MG tablet Take 1 tablet by mouth 2 times daily 360 tablet 3    PARoxetine (PAXIL) 10 MG tablet Take 1 tablet by mouth daily 30 tablet 3    levothyroxine (SYNTHROID) 50 MCG tablet TAKE 1 TABLET BY MOUTH DAILY 90 tablet 3    rivaroxaban (XARELTO) 15 MG TABS tablet Take 1 tablet by mouth daily With large meal of the day 30 tablet 11    Acetaminophen (TYLENOL 8 HOUR ARTHRITIS PAIN PO) Take by mouth 2 in morning and 2 in evening      vitamin B-12 (CYANOCOBALAMIN) 1000 MCG tablet Take 1 tablet by mouth daily 30 tablet 3    multivitamin (THERAGRAN) per tablet Take 1 tablet by mouth daily. No current facility-administered medications for this visit. 1. Paroxysmal atrial fibrillation ([address]    2. Essential hypertension    3. Sinus node dysfunction (HCC)    4. Pacemaker         Assessment and plan:   -Paroxsymal atrial fibrillation   -remains in sinus   -continue Flecainide    -on Xarelto    -HTN   -controlled    -SSS   -s/p PPM      Thank you for allowing me to participate in the care of Alexandra Ignacio. Further evaluation will be based upon the patient's clinical course and testing results. All questions and concerns were addressed to the patient/family. Alternatives to my treatment were discussed. I have discussed the above stated plan and the patient verbalized understanding and agreed with the plan.     Jesup Broom, [address]

## 2017-11-10 NOTE — CARE COORDINATION
[address] Transitions Follow Up Call    11/10/2017    Patient: Santo Wihteside  Patient : 1931   MRN: [id number]  Reason for Admission: Chest Pain, Fatigue  Discharge Date: 17 RARS: Risk Score: 24.75     Follow up call attempted, left contact info on       Follow Up  Future Appointments  Date Time Provider Vikki Alcala   2017 11:10 AM Sahara Almaraz MD [facility]   2017 3:40 PM Anitra Waite MD FF ENT [facility]       Tasneem Mistry RN

## 2017-11-13 ENCOUNTER — OFFICE VISIT (OUTPATIENT)
Dept: FAMILY MEDICINE CLINIC | Age: 82
End: 2017-11-13

## 2017-11-13 ENCOUNTER — PATIENT MESSAGE (OUTPATIENT)
Dept: FAMILY MEDICINE CLINIC | Age: 82
End: 2017-11-13

## 2017-11-13 VITALS
DIASTOLIC BLOOD PRESSURE: 82 MMHG | OXYGEN SATURATION: 93 % | HEART RATE: 101 BPM | SYSTOLIC BLOOD PRESSURE: 126 MMHG | BODY MASS INDEX: 17.76 KG/M2 | WEIGHT: 94 LBS

## 2017-11-13 DIAGNOSIS — E87.1 HYPONATREMIA: Primary | ICD-10-CM

## 2017-11-13 DIAGNOSIS — F32.9 SINGLE CURRENT EPISODE OF MAJOR DEPRESSIVE DISORDER, UNSPECIFIED DEPRESSION EPISODE SEVERITY: ICD-10-CM

## 2017-11-13 DIAGNOSIS — A04.72 CLOSTRIDIUM DIFFICILE COLITIS: ICD-10-CM

## 2017-11-13 DIAGNOSIS — R63.0 ANOREXIA: ICD-10-CM

## 2017-11-13 DIAGNOSIS — N30.01 ACUTE CYSTITIS WITH HEMATURIA: Primary | ICD-10-CM

## 2017-11-13 PROBLEM — R13.10 DYSPHAGIA: Status: RESOLVED | Noted: 2017-08-29 | Resolved: 2017-11-13

## 2017-11-13 PROBLEM — R07.9 CHEST PAIN: Status: RESOLVED | Noted: 2017-10-23 | Resolved: 2017-11-13

## 2017-11-13 PROCEDURE — 99214 OFFICE O/P EST MOD 30 MIN: CPT | Performed by: FAMILY MEDICINE

## 2017-11-13 RX ORDER — MEGESTROL ACETATE 40 MG/ML
200 SUSPENSION ORAL DAILY
Qty: 240 ML | Refills: 3 | Status: SHIPPED | OUTPATIENT
Start: 2017-11-13 | End: 2018-01-03 | Stop reason: DRUGHIGH

## 2017-11-13 RX ORDER — LACTOBACILLUS RHAMNOSUS GG 10B CELL
1 CAPSULE ORAL
Qty: 30 CAPSULE | Refills: 12 | Status: SHIPPED | OUTPATIENT
Start: 2017-11-13 | End: 2018-01-03

## 2017-11-13 NOTE — PATIENT INSTRUCTIONS
Tomorrow stop paxil. Start sertraline, take 1/2 tablet (25mg) daily for 1-2 weeks then go to the full tab 50mg.

## 2017-11-13 NOTE — TELEPHONE ENCOUNTER
From: Alexandra Ignacio  To: Odalys Mills MD  Sent: 11/13/2017 4:30 PM EST  Subject: Non-Urgent Medical Question    You had mentioned having blood work completed since Mom was drinking so  Much water. Just checking to see if you still wanted it done and if I need to  an order. Thank you.      Apple Partida

## 2017-11-14 ENCOUNTER — HOSPITAL ENCOUNTER (OUTPATIENT)
Dept: OTHER | Age: 82
Discharge: HOME | End: 2017-11-30
Attending: FAMILY MEDICINE | Admitting: FAMILY MEDICINE

## 2017-11-14 ENCOUNTER — TELEPHONE (OUTPATIENT)
Dept: FAMILY MEDICINE CLINIC | Age: 82
End: 2017-11-14

## 2017-11-14 DIAGNOSIS — E87.5 SERUM POTASSIUM ELEVATED: Primary | ICD-10-CM

## 2017-11-14 LAB
ANION GAP SERPL CALCULATED.3IONS-SCNC: 9 MMOL/L (ref 3–16)
BUN BLDV-MCNC: 22 MG/DL (ref 7–20)
CALCIUM SERPL-MCNC: 9.8 MG/DL (ref 8.3–10.6)
CHLORIDE BLD-SCNC: 93 MMOL/L (ref 99–110)
CO2: 30 MMOL/L (ref 21–32)
CREAT SERPL-MCNC: 0.6 MG/DL (ref 0.6–1.2)
GFR AFRICAN AMERICAN: >60
GFR NON-AFRICAN AMERICAN: >60
GLUCOSE BLD-MCNC: 120 MG/DL (ref 70–99)
POTASSIUM SERPL-SCNC: 5.9 MMOL/L (ref 3.5–5.1)
SODIUM BLD-SCNC: 132 MMOL/L (ref 136–145)

## 2017-11-14 NOTE — TELEPHONE ENCOUNTER
Pt's daughter ([phone]) would like to have the RK call and explain pt health & current condition    Please call and advise

## 2017-11-15 ENCOUNTER — HOSPITAL ENCOUNTER (OUTPATIENT)
Dept: OTHER | Age: 82
Discharge: HOME | End: 2017-11-15
Attending: FAMILY MEDICINE | Admitting: FAMILY MEDICINE

## 2017-11-15 DIAGNOSIS — E87.5 SERUM POTASSIUM ELEVATED: ICD-10-CM

## 2017-11-15 DIAGNOSIS — E87.1 HYPONATREMIA: ICD-10-CM

## 2017-11-15 LAB
ANION GAP SERPL CALCULATED.3IONS-SCNC: 16 MMOL/L (ref 3–16)
BUN BLDV-MCNC: 23 MG/DL (ref 7–20)
CALCIUM SERPL-MCNC: 10.3 MG/DL (ref 8.3–10.6)
CHLORIDE BLD-SCNC: 98 MMOL/L (ref 99–110)
CO2: 25 MMOL/L (ref 21–32)
CREAT SERPL-MCNC: 0.6 MG/DL (ref 0.6–1.2)
GFR AFRICAN AMERICAN: >60
GFR NON-AFRICAN AMERICAN: >60
GLUCOSE BLD-MCNC: 98 MG/DL (ref 70–99)
POTASSIUM SERPL-SCNC: 4.3 MMOL/L (ref 3.5–5.1)
SODIUM BLD-SCNC: 139 MMOL/L (ref 136–145)

## 2017-11-21 ENCOUNTER — CARE COORDINATION (OUTPATIENT)
Dept: CASE MANAGEMENT | Age: 82
End: 2017-11-21

## 2017-11-21 NOTE — CARE COORDINATION
[address] Transitions Follow Up Call    2017    Patient: Rissa Cunningham  Patient : 1931   MRN: [id number]  Reason for Admission: There are no discharge diagnoses documented for the most recent discharge. Discharge Date: 17 RARS: Risk Score: 24.75       Spoke with: pt's daughter, Sam Zarco Subsequent and Final Call    Subsequent and Final Calls  Do you have any ongoing symptoms?:  No  Have your medications changed?:  No  Do you have any questions related to your medications?:  No  Do you currently have any active services?:  Yes  Are you currently active with any services?:  Home Health  Do you have any needs or concerns that I can assist you with?:  No  Identified Barriers:  None  Care Transitions Interventions  No Identified Needs  Other Interventions:          Pt's daughter states pt is doing well, no issues or concerns, continues on tx for c-diff. MD appts listed below. No need for further  CTC f/u calls      Follow Up  Future Appointments  Date Time Provider Vikki Alcala   2017 3:40 PM Shadia Card MD  ENT [facility]   2017 11:10 AM Maldonado Rosales MD [facility] REGINA Sanchez RN

## 2017-11-27 ENCOUNTER — OFFICE VISIT (OUTPATIENT)
Dept: ENT CLINIC | Age: 82
End: 2017-11-27

## 2017-11-27 VITALS
HEIGHT: 61 IN | SYSTOLIC BLOOD PRESSURE: 143 MMHG | WEIGHT: 96 LBS | DIASTOLIC BLOOD PRESSURE: 78 MMHG | HEART RATE: 74 BPM | BODY MASS INDEX: 18.12 KG/M2

## 2017-11-27 DIAGNOSIS — F32.9 REACTIVE DEPRESSION: ICD-10-CM

## 2017-11-27 DIAGNOSIS — R13.12 OROPHARYNGEAL DYSPHAGIA: Chronic | ICD-10-CM

## 2017-11-27 PROCEDURE — 31575 DIAGNOSTIC LARYNGOSCOPY: CPT | Performed by: OTOLARYNGOLOGY

## 2017-11-27 RX ORDER — MIDODRINE HYDROCHLORIDE 2.5 MG/1
2.5 TABLET ORAL 3 TIMES DAILY
Qty: 90 TABLET | Refills: 5 | Status: ON HOLD | OUTPATIENT
Start: 2017-11-27 | End: 2018-01-14 | Stop reason: HOSPADM

## 2017-11-27 RX ORDER — PAROXETINE 10 MG/1
10 TABLET, FILM COATED ORAL DAILY
Qty: 30 TABLET | Refills: 3 | Status: SHIPPED | OUTPATIENT
Start: 2017-11-27 | End: 2017-11-27

## 2017-11-27 NOTE — PROGRESS NOTES
Chief Complaint   Patient presents with    Dysphagia       PROCEDURE:  FLEXIBLE FIBEROPTIC NASOPHARYNGOLARYNGOSCOPY  INDICATION:  Inadequate visualization by indirect laryngoscopy mirror examination and need for detailed examination of the larynx and pharynx to evaluate dysphagia. INFORMED CONSENT:  The patient was advised of the medical necessity for this procedure, which was described. The attendant risks and potential complications were discussed, including, but not limited to bleeding, infection, adverse reaction to medications, hoarseness, sore throat, inability to obtain adequate visualization, and future need for rigid operative endoscopy. The expected outcome, potential benefits and the alternatives of therapy were discussed. Sue Letters asked appropriate questions and then expressed the lack of any further questions, understanding, acceptance, and the desire to undergo with this procedure, granting verbal informed consent. FINDINGS:  NSD to the right. Normal endoscopic appearance of the upper aerodigestive tract. The vocal cords appeared to be normal, with no nodule, ulceration, polyp, leukoplakia or other lesions. The vocal cords appeared to be normally mobile bilaterally with midline approximation on phonation. Sensation of the hypopharynx and larynx appeared to be normal when touched by the end of the flexible scope. The nasopharynx, eustachian tube orifices and fossa of Rosenmüller, oropharynx, base of tongue, hypopharynx, supraglottis, subglottis, and piriform sinuses all appeared to be normal, with no lesions. Visualization was excellent throughout the examination. Tonsil and OP was clear with no evidence of PTA. DESCRIPTION OF PROCEDURE:  The right and left nasal cavity was topically anesthetized and decongested with a 5050 mixture of 0.05% oxymetazoline solution and topical 4% lidocaine solution by nasal sprayer, twice.   After about ten minutes, the Endo-sheath was placed on the flexible fiberoptic nasopharyngolaryngoscope, which then was inserted through the left nare/nasal cavity and advanced to the nasopharynx and then to the hypopharynx and larynx. After adequate endoscopic visualization, the endoscope was removed. The patient appeared to tolerate the procedure well with no evidence of perioperative complications. Annika Rowan / Lawrence Nicole / Sergei Avery / Emily Ibarra was seen today for dysphagia. Diagnoses and all orders for this visit:    Oropharyngeal dysphagia  Comments:  No evidence of ENT etiology         RECOMMENDATIONS / PLAN     1. Follow up on dysphagia with PCP and consider evaluation by Gastroenterologist.    2. Return for any further Ear, Nose, Throat, or Sinus problems.

## 2017-11-28 ENCOUNTER — TELEPHONE (OUTPATIENT)
Dept: FAMILY MEDICINE CLINIC | Age: 82
End: 2017-11-28

## 2017-11-28 DIAGNOSIS — K52.9 CHRONIC DIARRHEA: Primary | ICD-10-CM

## 2017-11-29 ENCOUNTER — OFFICE VISIT (OUTPATIENT)
Dept: FAMILY MEDICINE CLINIC | Age: 82
End: 2017-11-29

## 2017-11-29 VITALS
HEART RATE: 122 BPM | WEIGHT: 95.6 LBS | SYSTOLIC BLOOD PRESSURE: 102 MMHG | OXYGEN SATURATION: 98 % | DIASTOLIC BLOOD PRESSURE: 42 MMHG | BODY MASS INDEX: 18.06 KG/M2

## 2017-11-29 DIAGNOSIS — F33.0 MILD EPISODE OF RECURRENT MAJOR DEPRESSIVE DISORDER (HCC): ICD-10-CM

## 2017-11-29 DIAGNOSIS — A04.72 C. DIFFICILE COLITIS: Primary | ICD-10-CM

## 2017-11-29 DIAGNOSIS — I95.1 ORTHOSTATIC HYPOTENSION: ICD-10-CM

## 2017-11-29 PROCEDURE — 99213 OFFICE O/P EST LOW 20 MIN: CPT | Performed by: FAMILY MEDICINE

## 2017-12-01 ENCOUNTER — HOSPITAL ENCOUNTER (OUTPATIENT)
Dept: OTHER | Age: 82
Discharge: HOME | End: 2017-12-31
Attending: FAMILY MEDICINE | Admitting: FAMILY MEDICINE

## 2017-12-06 ENCOUNTER — HOSPITAL ENCOUNTER (OUTPATIENT)
Dept: OTHER | Age: 82
Discharge: HOME | End: 2017-12-06
Attending: FAMILY MEDICINE | Admitting: FAMILY MEDICINE

## 2017-12-07 ENCOUNTER — HOSPITAL ENCOUNTER (OUTPATIENT)
Dept: OTHER | Age: 82
Discharge: HOME | End: 2017-12-07
Attending: FAMILY MEDICINE | Admitting: FAMILY MEDICINE

## 2017-12-12 ENCOUNTER — TELEPHONE (OUTPATIENT)
Dept: FAMILY MEDICINE CLINIC | Age: 82
End: 2017-12-12

## 2017-12-12 ENCOUNTER — PATIENT MESSAGE (OUTPATIENT)
Dept: FAMILY MEDICINE CLINIC | Age: 82
End: 2017-12-12

## 2017-12-12 NOTE — TELEPHONE ENCOUNTER
Saman Weston, home health nurse for patient, called in to report patient is home today after being in ED and been diagnosed with C-DIFF, HTN, headache and dehydration. Patient is very shaky today, elevated B/P 184/92 and would like to know what to do, patient does not want to go back to ER, and B/P has gotten down to 148/86, she would like this call marked urgent.

## 2017-12-19 LAB
IGA: 377 MG/DL (ref 70–400)
TSH SERPL DL<=0.05 MIU/L-ACNC: 2.58 UIU/ML (ref 0.27–4.2)

## 2017-12-21 LAB — TISSUE TRANSGLUTAMINASE IGA: 1 U/ML (ref 0–3)

## 2017-12-26 ENCOUNTER — HOSPITAL ENCOUNTER (OUTPATIENT)
Dept: OTHER | Age: 82
Discharge: HOME | End: 2017-12-26
Attending: INTERNAL MEDICINE | Admitting: INTERNAL MEDICINE

## 2018-01-02 ENCOUNTER — TELEPHONE (OUTPATIENT)
Dept: FAMILY MEDICINE CLINIC | Age: 83
End: 2018-01-02

## 2018-01-02 NOTE — TELEPHONE ENCOUNTER
Would call urology office to check on results of urine culture. Needs to be seen. Any openings tomorrow?

## 2018-01-03 ENCOUNTER — OFFICE VISIT (OUTPATIENT)
Dept: FAMILY MEDICINE CLINIC | Age: 83
End: 2018-01-03

## 2018-01-03 VITALS
WEIGHT: 101 LBS | DIASTOLIC BLOOD PRESSURE: 60 MMHG | HEART RATE: 74 BPM | BODY MASS INDEX: 19.08 KG/M2 | TEMPERATURE: 97.4 F | SYSTOLIC BLOOD PRESSURE: 102 MMHG | OXYGEN SATURATION: 97 %

## 2018-01-03 DIAGNOSIS — R06.02 SOB (SHORTNESS OF BREATH): ICD-10-CM

## 2018-01-03 DIAGNOSIS — R07.9 CHEST PAIN, UNSPECIFIED TYPE: Primary | ICD-10-CM

## 2018-01-03 DIAGNOSIS — R25.1 TREMOR: ICD-10-CM

## 2018-01-03 DIAGNOSIS — I95.1 ORTHOSTATIC HYPOTENSION: ICD-10-CM

## 2018-01-03 DIAGNOSIS — I48.0 PAROXYSMAL ATRIAL FIBRILLATION (HCC): ICD-10-CM

## 2018-01-03 DIAGNOSIS — R30.0 BURNING WITH URINATION: ICD-10-CM

## 2018-01-03 LAB
BACTERIA URINE, POC: NORMAL
BILIRUBIN URINE: 0 MG/DL
BLOOD, URINE: POSITIVE
CASTS URINE, POC: NORMAL
CLARITY: CLEAR
COLOR: NORMAL
CRYSTALS URINE, POC: NORMAL
EPI CELLS URINE, POC: NORMAL
GLUCOSE URINE: NEGATIVE
KETONES, URINE: NEGATIVE
LEUKOCYTE EST, POC: NEGATIVE
NITRITE, URINE: NEGATIVE
PH UA: 6.5 (ref 4.5–8)
PROTEIN UA: NEGATIVE
RBC URINE, POC: NORMAL
SPECIFIC GRAVITY UA: 1.01 (ref 1–1.03)
UROBILINOGEN, URINE: NORMAL
WBC URINE, POC: NORMAL
YEAST URINE, POC: NORMAL

## 2018-01-03 PROCEDURE — 81000 URINALYSIS NONAUTO W/SCOPE: CPT | Performed by: FAMILY MEDICINE

## 2018-01-03 PROCEDURE — 93000 ELECTROCARDIOGRAM COMPLETE: CPT | Performed by: FAMILY MEDICINE

## 2018-01-03 PROCEDURE — 99214 OFFICE O/P EST MOD 30 MIN: CPT | Performed by: FAMILY MEDICINE

## 2018-01-03 RX ORDER — NITROFURANTOIN MACROCRYSTALS 100 MG/1
CAPSULE ORAL
Refills: 6 | COMMUNITY
Start: 2017-12-27 | End: 2018-01-03 | Stop reason: ALTCHOICE

## 2018-01-03 RX ORDER — MEGESTROL ACETATE 40 MG/ML
100 SUSPENSION ORAL DAILY
Qty: 240 ML | Refills: 3 | Status: SHIPPED | OUTPATIENT
Start: 2018-01-03 | End: 2018-04-03 | Stop reason: DRUGHIGH

## 2018-01-03 NOTE — PROGRESS NOTES
Subjective:      Patient ID: Annie Celaya is a 80 y.o. female. HPI patient presents today for abdominal pain, sob with any movement. Patient was given macrobid from urology and finished 1/2/18. Patient is still having burning with urination. Patient also given an estradiol cream from urology. Had appt for later this week but moved to today as having more SOB, getting progressively worse. Even the littleist movement and gets sob. Some wet cough x 3 weeks per daughter. Throughout day. Has been having episodes of shaking, chest pain that feels like something moving around. Gets sob with the pain but also sob w/o any pain. Will also get shaking and muscles twitching. While in office today had sudden onset of CP and shaking. After lying down for 5-10 min started to feel some better. BP has been better, not needing midodrin as often and less sx of low bp. Eating much better with megace, eating all the time per daughter and pt. Has had improvement in orthostasis sx as well. Review of Systems    Objective:   Physical Exam  Vitals:    01/03/18 1059   BP: 102/60   Site: Left Arm   Position: Sitting   Cuff Size: Medium Adult   Pulse: 74   Temp: 97.4 °F (36.3 °C)   TempSrc: Tympanic   SpO2: 97%   Weight: 101 lb (45.8 kg)     Wt Readings from Last 3 Encounters:   01/03/18 101 lb (45.8 kg)   01/03/18 101 lb (45.8 kg)   12/11/17 96 lb (43.5 kg)     Body mass index is 19.08 kg/m². Alert and oriented x 4 , slender, well hydrated, well developed. Lungs CTAB  CV RRR   No edema  During event very shaky and legs with ?fasiculations in thighs        Assessment:        Lorri Burks was seen today for abdominal pain. Diagnoses and all orders for this visit:    Chest pain, unspecified type  -     EKG 12 Lead  Had similar when admitted end of October. [place] to be due to afib. Wonder if sx related to afib. Needs to f/u with cards.  EKG ok during event but did not get started right away as pt wearing pantyhose and

## 2018-01-04 ENCOUNTER — OFFICE VISIT (OUTPATIENT)
Dept: NEUROLOGY | Age: 83
End: 2018-01-04

## 2018-01-04 VITALS
HEART RATE: 76 BPM | DIASTOLIC BLOOD PRESSURE: 78 MMHG | WEIGHT: 101 LBS | SYSTOLIC BLOOD PRESSURE: 133 MMHG | BODY MASS INDEX: 19.07 KG/M2 | HEIGHT: 61 IN

## 2018-01-04 DIAGNOSIS — F41.0 PANIC ATTACK: ICD-10-CM

## 2018-01-04 DIAGNOSIS — G40.119 PARTIAL EPILEPSY WITH INTRACTABLE EPILEPSY (HCC): Primary | ICD-10-CM

## 2018-01-04 PROCEDURE — 99214 OFFICE O/P EST MOD 30 MIN: CPT | Performed by: PSYCHIATRY & NEUROLOGY
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
## 2018-01-05 LAB
ORGANISM: ABNORMAL
URINE CULTURE, ROUTINE: ABNORMAL
URINE CULTURE, ROUTINE: ABNORMAL

## 2018-01-12 ENCOUNTER — HOSPITAL ENCOUNTER (OUTPATIENT)
Dept: ENDOSCOPY | Age: 83
Discharge: HOME | End: 2018-01-12
Admitting: INTERNAL MEDICINE

## 2018-01-12 RX ORDER — SODIUM CHLORIDE 9 MG/ML
INJECTION, SOLUTION INTRAVENOUS CONTINUOUS
Status: DISCONTINUED | OUTPATIENT
Start: 2018-01-12 | End: 2018-01-13 | Stop reason: HOSPADM

## 2018-01-12 RX ORDER — SODIUM CHLORIDE 0.9 % (FLUSH) 0.9 %
10 SYRINGE (ML) INJECTION PRN
Status: DISCONTINUED | OUTPATIENT
Start: 2018-01-12 | End: 2018-01-13 | Stop reason: HOSPADM

## 2018-01-12 RX ORDER — SODIUM CHLORIDE 0.9 % (FLUSH) 0.9 %
10 SYRINGE (ML) INJECTION EVERY 12 HOURS SCHEDULED
Status: DISCONTINUED | OUTPATIENT
Start: 2018-01-12 | End: 2018-01-13 | Stop reason: HOSPADM

## 2018-01-12 ASSESSMENT — COPD QUESTIONNAIRES: CAT_SEVERITY: MILD

## 2018-01-12 ASSESSMENT — ENCOUNTER SYMPTOMS: SHORTNESS OF BREATH: 1

## 2018-01-12 NOTE — ANESTHESIA PRE-OP
Department of Anesthesiology  Preprocedure Note       Name:  Zhanna Meade   Age:  80 y.o.  :  1931                                          MRN:  [id number]         Date:  2018      Surgeon:    Procedure:    Medications prior to admission:   Prior to Admission medications    Medication Sig Start Date End Date Taking? Authorizing Provider   megestrol (MEGACE ORAL) 40 MG/ML suspension Take 2.5 mLs by mouth daily 1/3/18   Balbir Shields MD   traMADol (ULTRAM) 50 MG tablet Take 50 mg by mouth every 6 hours as needed for Pain .     Historical Provider, MD   albuterol sulfate  (90 Base) MCG/ACT inhaler Inhale 2 puffs into the lungs every 6 hours as needed for Wheezing    Historical Provider, MD   nystatin (MYCOSTATIN) 390537 UNIT/ML suspension Take 5 mLs by mouth 4 times daily 17   Balbir Shields MD   sertraline (ZOLOFT) 50 MG tablet Take 0.5 tablets by mouth daily  Patient taking differently: Take 50 mg by mouth daily  17   Balbir Shields MD   midodrine (PROAMATINE) 2.5 MG tablet TAKE 1 TABLET BY MOUTH 3 TIMES DAILY 17   Balbir Shields MD   Cranberry 1000 MG CAPS Take by mouth    Historical Provider, MD   flecainide (TAMBOCOR) 50 MG tablet Take 1 tablet by mouth every 12 hours 10/30/17   Rudy Almazan MD   ferrous sulfate 325 (65 Fe) MG tablet Take 1 tablet by mouth daily (with breakfast) 10/31/17   Davina Stuart MD   tiotropium (SPIRIVA) 18 MCG inhalation capsule Inhale 1 capsule into the lungs daily 17   Kyree Abel MD   midodrine (PROAMATINE) 2.5 MG tablet Take 1 tablet by mouth 3 times daily 17   Balbir Shields MD   levETIRAcetam (KEPPRA) 750 MG tablet Take 1 tablet by mouth 2 times daily 17   Balbir Shields MD   levothyroxine (SYNTHROID) 50 MCG tablet TAKE 1 TABLET BY MOUTH DAILY 17   Balbir Shields MD   rivaroxaban (XARELTO) 15 MG TABS tablet Take 1 tablet by mouth daily With large meal of the day 17   Brandon Freeman MD Full range of motion without deformity. Skin: Skin color, texture, turgor normal.  No rashes or lesions. Neurologic:  Neurovascularly intact without any focal sensory/motor deficits. Cranial nerves: II-XII intact, grossly non-focal.  Psychiatric:  Alert and oriented, thought content appropriate, normal insight        Labs: For convenience and continuity at follow-up the following most recent labs are provided:        CBC:      Lab Results  Component Value Date    WBC 5.0 11/01/2017    HGB 11.0 11/01/2017    HCT 32.8 11/01/2017     11/01/2017        Renal:      Lab Results  Component Value Date     11/01/2017    K 4.5 11/01/2017     11/01/2017    CO2 29 11/01/2017    BUN 14 11/01/2017    CREATININE 0.5 11/01/2017    CALCIUM 9.6 11/01/2017    PHOS 3.8 11/01/2017           Significant Diagnostic Studies     Radiology:     VL Extremity Venous Bilateral  Final Result     XR KNEE LEFT (1-2 VIEWS)  Final Result  Chondrocalcinosis is noted bilaterally with mild joint space narrowing.        XR KNEE RIGHT (1-2 VIEWS)  Final Result  Chondrocalcinosis is noted bilaterally with mild joint space narrowing.        CTA CARDIAC W C STRC MORP W CONTRAST  Final Result  No significant coronary artery stenosis is identified.     Normal variation in the supply the anterolateral wall.   Although only 1 acute  diagonal is identified, there are 2 proximal obtuse marginal vessels.     Right coronary dominance.        Stress/Rest NM Myocardial SPECT - do not uncheck  Final Result     CT Head WO Contrast  Final Result  No acute intracranial abnormality.     Bilateral sphenoid sinus disease.        XR Chest Portable  Final Result  Stable portable study.                 Consults:      IP CONSULT TO HOSPITALIST  IP CONSULT TO CARDIOLOGY  IP CONSULT TO DIETITIAN  IP CONSULT TO FINANCIAL COUNSELOR  IP CONSULT TO NEPHROLOGY  IP CONSULT TO HOME CARE NEEDS  IP CONSULT TO PHYSICAL MEDICINE REHAB     Disposition:  Home with [facility] AT [facility] Scan on 10/21/2017 4:30 PM by [facility]anda Melgar, [facility] Scan on 10/21/2017 4:30 PM by Panda Melgar, [facility] Consent Treat/HIPAA - Scan on 10/21/2017 11:36 AM by Panda Melgar, [facility] Cons[facility]/HIPAA - Scan on 10/21/2017 11:36 AM by Panda Melgar [facility]   IMM Medicare - Scan on 10/21/2017 11:36 AM by Panda Melgar, [place] [facility] - Scan on 10/21/2017 11:36 AM by Panda Melgar, [facility]   Facesheet - Scan on 10/21/2017 11:21 AM by Panda Melgar, [place] [facility] - Scan on 10/21/2017 11:21 AM by Panda Melgar, [facility]   Facesheet - Scan on 10/21/2017 11:11 AM by Panda Melgar, [place] [facility] - Scan on 10/21/2017 11:11 AM by Panda Melgar, [facility]   Facesheet - Scan on 10/21/2017 11:00 AM by Panda Melgar, [place] [facility] - Scan on 10/21/2017 11:00 AM by Panda Melgar, [facility]   Patient Demographics     Patient Name  Cinthya Dura Sex  Female   1931 [facility]  [id number] Address  [address] [place] [address]  [id number] Phone  [phone] (Home)  [phone] (Work)  [phone] (Mobile)  Note Information     Progress Notes All Except Progress Notes All Notes  Linked Episodes     ct From 10/23/2017 to 2017     Hospital Problem List       Paroxysmal atrial fibrillation [facility])     C. difficile colitis     Chest pain Resolved     Orthostatic hypotension Resolved   Care Timeline     10/21   Admitted from ED 1630     Discharged 1220  Discharge         [facility]            AVS     1 Wallet Med List (Printed 2017)   2 Antibiotics (Printed 2017)   3 After Visit Summary (Printed 2017)           Follow-Ups     1 Follow up with Samuel Burnett MD (Family Medicine)   2 Follow up with [address] Telma Husain ([address])   3   Medication List at Discharge        Acetaminophen Oral, 2 in morning and 2 in evening     Albuterol Sulfate 108 (90 Base) MCG/ACT 2 puffs Inhalation EVERY 6 HOURS PRN     Cyanocobalamin 1,000 mcg Oral DAILY     Ferrous Sulfate 325 mg Oral DAILY WITH BREAKFAST     Flecainide Acetate 50 mg Oral EVERY 12 HOURS SCHEDULED Lactobacillus Rhamnosus (GG) 1 capsule Oral DAILY WITH BREAKFAST     LevETIRAcetam 750 mg Oral 2 TIMES DAILY     Levothyroxine Sodium 50 MCG TAKE 1 TABLET BY MOUTH DAILY     Midodrine HCl 2.5 mg Oral 3 TIMES DAILY      Multiple Vitamin 1 tablet DAILY     PARoxetine HCl 10 mg Oral DAILY     Rivaroxaban 15 mg Oral DAILY, With large meal of the day     Tiotropium Bromide Monohydrate 18 mcg Inhalation DAILY     Vancomycin HCl 125 mg Oral EVERY 6 HOURS   )        Anesthetic plan and risks discussed with patient. Plan discussed with CRNA.                   Laila Oswald MD   1/12/2018

## 2018-01-13 PROBLEM — R07.9 CHEST PAIN: Status: ACTIVE | Noted: 2018-01-13

## 2018-01-15 ENCOUNTER — CARE COORDINATION (OUTPATIENT)
Dept: CASE MANAGEMENT | Age: 83
End: 2018-01-15

## 2018-01-15 ENCOUNTER — OFFICE VISIT (OUTPATIENT)
Dept: CARDIOLOGY CLINIC | Age: 83
End: 2018-01-15

## 2018-01-15 ENCOUNTER — PROCEDURE VISIT (OUTPATIENT)
Dept: CARDIOLOGY CLINIC | Age: 83
End: 2018-01-15

## 2018-01-15 VITALS
BODY MASS INDEX: 19.63 KG/M2 | SYSTOLIC BLOOD PRESSURE: 110 MMHG | DIASTOLIC BLOOD PRESSURE: 70 MMHG | WEIGHT: 104 LBS | HEIGHT: 61 IN | OXYGEN SATURATION: 95 % | HEART RATE: 78 BPM

## 2018-01-15 DIAGNOSIS — R07.9 CHEST PAIN, UNSPECIFIED TYPE: ICD-10-CM

## 2018-01-15 DIAGNOSIS — Z95.0 PACEMAKER: ICD-10-CM

## 2018-01-15 DIAGNOSIS — R07.9 CHEST PAIN, UNSPECIFIED TYPE: Primary | ICD-10-CM

## 2018-01-15 DIAGNOSIS — I48.0 PAROXYSMAL ATRIAL FIBRILLATION (HCC): Primary | ICD-10-CM

## 2018-01-15 DIAGNOSIS — I49.5 SINUS NODE DYSFUNCTION (HCC): ICD-10-CM

## 2018-01-15 DIAGNOSIS — I48.0 PAROXYSMAL ATRIAL FIBRILLATION (HCC): ICD-10-CM

## 2018-01-15 DIAGNOSIS — I95.1 ORTHOSTATIC HYPOTENSION: ICD-10-CM

## 2018-01-15 PROCEDURE — 99214 OFFICE O/P EST MOD 30 MIN: CPT | Performed by: INTERNAL MEDICINE

## 2018-01-15 PROCEDURE — 1111F DSCHRG MED/CURRENT MED MERGE: CPT | Performed by: FAMILY MEDICINE

## 2018-01-15 PROCEDURE — 93280 PM DEVICE PROGR EVAL DUAL: CPT | Performed by: INTERNAL MEDICINE

## 2018-01-15 NOTE — LETTER
· Cardiovascular: Normal rate, regular rhythm, S1&S2 and intact distal pulses   · Incision has healed well. · Pulmonary/Chest: Bilateral respiratory sounds. No wheezes. No rhonchi. · Abdominal: Soft. Bowel sounds present. No distension, No tenderness. · Musculoskeletal: No tenderness. No edema    · Lymphadenopathy: Has no cervical adenopathy. · Neurological: Alert and oriented. No Gross deficit   · Skin: Skin is warm and dry. No rash noted. · Psychiatric: Has a normal mood, affect and behavior     Labs:  Reviewed. ECHO: 8/24/17   Summary   -Global ejection fraction is increased (hyperdynamic) and estimated from 70   % to 75 %.  -No regional wall motion abnormalities are noted.   -Thickened mitral valve without evidence of stenosis. Trace mitral   regurgitation.   -There is trivial tricuspid regurgitation with RVSP estimated at 33 mmHg.   -Pacemaker wire noted in right heart.   -E/e'= 14.3 . Echo: 2/2/16  Summary   -Normal left ventricle size, wall thickness and systolic function with an   estimated ejection fraction of 55%.   -Trivial mitral regurgitation is present.   -There is mild tricuspid regurgitation with RVSP estimated at 33 mmHg.     Cath:     10/25/17 Coronary CTA  No significant coronary artery stenosis is identified.       Normal variation in the supply the anterolateral wall.  Although only 1 acute   diagonal is identified, there are 2 proximal obtuse marginal vessels.       Right coronary dominance.         STRESS TEST:   10/24/17  Summary    Normal myocardial perfusion study.    Normal LV size and systolic function.         Nuc Stress: 5/5/16  Summary    Normal myocardial perfusion study.    Normal LV size and systolic function. 1/13/18 CTPA  1. No findings of pulmonary embolism. 2. Patchy left upper lobe bronchial secretions with minimal bilateral central   bronchial wall thickening, suggesting bronchitis.        Carotid ultrasound: 5/2016

## 2018-01-15 NOTE — COMMUNICATION BODY
testing results. All questions and concerns were addressed to the patient/family. Alternatives to my treatment were discussed. I have discussed the above stated plan and the patient verbalized understanding and agreed with the plan.     Blanquita Mai MD, MPH  [place] [id number]   Office: [phone]

## 2018-01-19 ENCOUNTER — CARE COORDINATION (OUTPATIENT)
Dept: CASE MANAGEMENT | Age: 83
End: 2018-01-19

## 2018-01-19 DIAGNOSIS — R07.9 CHEST PAIN, UNSPECIFIED TYPE: Primary | ICD-10-CM

## 2018-01-19 PROCEDURE — 1111F DSCHRG MED/CURRENT MED MERGE: CPT | Performed by: FAMILY MEDICINE

## 2018-01-19 NOTE — CARE COORDINATION
[address] Transitions Follow Up Call    2018    Patient: Indra Lobo  Patient : 1931   MRN: [id number]  Reason for Admission: CP  Discharge Date: 18 RARS: Risk Score: 24.75       Spoke with: pt's daughter    Care Transitions Subsequent and Final Call    Subsequent and Final Calls  Do you have any ongoing symptoms?:  No  Have your medications changed?:  No  Do you have any questions related to your medications?:  No  Do you currently have any active services?:  Yes  Are you currently active with any services?:  Home Health  Do you have any needs or concerns that I can assist you with?:  No  Identified Barriers:  None  Care Transitions Interventions  No Identified Needs  Other Interventions:          Pt states doing well, f/u with cardiology went well. Pt has had both the flu and pneumonia vaccines.  Agreed to more CTC f/u calls      Follow Up  Future Appointments  Date Time Provider Vikki Alcala   2018 11:00 AM Laurent Sutton MD [facility]   4/3/2018 11:20 AM Kalin Young MD FF NEURO [facility]   2018 11:30 AM SCHEDULE, KRISTA GARCIA Cardio [facility]       Drea Sawyer, DOUGLAS

## 2018-01-22 NOTE — TELEPHONE ENCOUNTER
Last ov 1/15/18  Pending appt due in october  Last refill 1/23/17 #[id number]      Assessment:         Patient Active Problem List     Diagnosis Date Noted    Essential hypertension 01/18/2016       Priority: High    Orthostatic hypotension 03/02/2015       Priority: High    Partial epilepsy with intractable epilepsy [facility] [address] 08/19/2014       Priority: High    COPD (chronic obstructive pulmonary disease) ([facility] [address] 06/06/2013       Priority: High    Post traumatic seizure ([facility] [address] 06/06/2013       Priority: High    Anemia 05/31/2016       Priority: Medium    Hypothyroidism 03/19/2015       Priority: Medium    Constipation 03/02/2015       Priority: Low    Spinal stenosis of lumbar region 07/07/2014       Priority: Low    DDD (degenerative disc disease), lumbar 06/06/2013       Priority: Low    Osteopenia 06/06/2013       Priority: Low    Chest pain 01/13/2018    Mild episode of recurrent major depressive disorder ([facility] [address] 11/29/2017    C. difficile colitis 10/31/2017    SOB (shortness of breath)      DDD (degenerative disc disease), cervical 08/07/2017    Paroxysmal atrial fibrillation ([address] 01/23/2017    Pacemaker 10/03/2016    Sinus node dysfunction ([facility] [address] 09/26/2016    Oropharyngeal dysphagia 03/02/2016         Plan:  - Very atypical chest pressure. Patient has had sharp chest pain lasting only for second which has been resolved. She had colonoscopy before this happens. No recurrent chest pain. She's been seen by interventional cardiology and has had extensive workup in the past which rule out coronary artery disease              Cause of this chest pain is likely noncardiac     - Symptomatic bradycardia               Sinus node dysfunction:               S/p pacemaker implantation. I reviewed device interrogation today. Device functions normally. Apaced 1% Vpaced minimal               Discussed with patient.                Follow up

## 2018-01-23 RX ORDER — RIVAROXABAN 15 MG/1
TABLET, FILM COATED ORAL
Qty: 30 TABLET | Refills: 11 | Status: SHIPPED | OUTPATIENT
Start: 2018-01-23 | End: 2019-01-16 | Stop reason: SDUPTHER

## 2018-01-25 ENCOUNTER — CARE COORDINATION (OUTPATIENT)
Dept: CASE MANAGEMENT | Age: 83
End: 2018-01-25

## 2018-01-25 NOTE — CARE COORDINATION
[address] Transitions Follow Up Call    2018    Patient: Celia Thorne  Patient : 1931   MRN: [id number]  Reason for Admission: CP  Discharge Date: 18 RARS: Risk Score: 24.75       Spoke with: pt's daughter, Abran Ochoa Transitions Subsequent and Final Call    Subsequent and Final Calls  Do you have any ongoing symptoms?:  No  Have your medications changed?:  No  Do you have any questions related to your medications?:  No  Do you currently have any active services?:  Yes  Are you currently active with any services?:  Home Health  Do you have any needs or concerns that I can assist you with?:  No  Identified Barriers:  None  Care Transitions Interventions  No Identified Needs  Other Interventions:          Pt's daughter states mom doing well, has f/u appt with Dr. Frankey Dace next week, . Reminded daughter and pt to stay away from large crowds, sick people and good handwashing, verbalized understanding. Jock Shows stated keeping mom home for the most part trying to avoid the flu.  Agreed to more CTC f/u calls      Follow Up  Future Appointments  Date Time Provider Vikki Alcala   2018 11:00 AM Jonathan Mcneal MD [facility]   4/3/2018 11:20 AM MD RADHA Urias NEURO [facility]   2018 11:30 AM SCHEDULE, KRISTA GARCIA Cardio REGNIA Christy RN

## 2018-01-29 ENCOUNTER — CARE COORDINATION (OUTPATIENT)
Dept: CASE MANAGEMENT | Age: 83
End: 2018-01-29

## 2018-02-02 ENCOUNTER — OFFICE VISIT (OUTPATIENT)
Dept: FAMILY MEDICINE CLINIC | Age: 83
End: 2018-02-02

## 2018-02-02 VITALS
OXYGEN SATURATION: 96 % | BODY MASS INDEX: 19.27 KG/M2 | HEART RATE: 94 BPM | DIASTOLIC BLOOD PRESSURE: 68 MMHG | WEIGHT: 102 LBS | SYSTOLIC BLOOD PRESSURE: 112 MMHG

## 2018-02-02 DIAGNOSIS — R06.02 SOB (SHORTNESS OF BREATH): ICD-10-CM

## 2018-02-02 DIAGNOSIS — I95.1 ORTHOSTATIC HYPOTENSION: Primary | ICD-10-CM

## 2018-02-02 DIAGNOSIS — K58.8 OTHER IRRITABLE BOWEL SYNDROME: ICD-10-CM

## 2018-02-02 DIAGNOSIS — F33.0 MILD EPISODE OF RECURRENT MAJOR DEPRESSIVE DISORDER (HCC): ICD-10-CM

## 2018-02-02 PROBLEM — A04.72 C. DIFFICILE COLITIS: Status: RESOLVED | Noted: 2017-10-31 | Resolved: 2018-02-02

## 2018-02-02 PROBLEM — R07.9 CHEST PAIN: Status: RESOLVED | Noted: 2018-01-13 | Resolved: 2018-02-02

## 2018-02-02 PROCEDURE — 99214 OFFICE O/P EST MOD 30 MIN: CPT | Performed by: FAMILY MEDICINE

## 2018-02-02 NOTE — PROGRESS NOTES
Subjective:      Patient ID: Tobi Garces is a 80 y.o. female. HPI patient presents today for her 1 month follow up. Patient was also in the hospital for chest pains on 1/14/18, admitted for an over night observation. Patient also c/o having a non formed, mushy stool every time she urinates sometimes 5-6 times a day. No ability to hold in the stool. Here for f/u weight and hospital. After last seen was admitted with cp, workup neg, felt to be non cardiac. Saw EP and neuro, felt \"spells\" were not seizures or heart related (pacemaker interrogation did not show afib or other arhythmia) and likely due to anxiety. Pt has not had anymore since last here. Still gets low bp but taking midodrine regularly and helps. Still having 4-5 soft stools daily, states can't control it when has to go, has to go. No diarrhea. 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 Appetite good. 2 ensure a day. Eating all the time per pt and family and pt reports food tastes good again. Still not wanting to do much. Gets sob even with little activity. Daughter tries to get her to go out but often declines. Review of Systems    Objective:   Physical Exam  Body mass index is 19.27 kg/m². Vitals:    02/02/18 1107   BP: 112/68   Site: Left Arm   Position: Sitting   Cuff Size: Medium Adult   Pulse: 94   SpO2: 96%   Weight: 102 lb (46.3 kg)     Wt Readings from Last 3 Encounters:   02/02/18 102 lb (46.3 kg)   01/15/18 104 lb (47.2 kg)   01/14/18 104 lb 11.2 oz (47.5 kg)       GENERAL:Alert and oriented x 4 NAD, slender, well hydrated, well developed.   NECK:supple and non tender without mass, no thyromegaly or thyroid nodules, no cervical lymphadenopathy  LUNG:clear to auscultation bilaterally with normal respiratory effort  CV: Normal heart sounds, regular rate and rhythm without murmurs  EXTREMETY: no loss of hair, no edema, normal pedal pulses bilaterally    PHQ

## 2018-02-06 ENCOUNTER — PATIENT MESSAGE (OUTPATIENT)
Dept: FAMILY MEDICINE CLINIC | Age: 83
End: 2018-02-06

## 2018-02-06 NOTE — TELEPHONE ENCOUNTER
From: Cisco Carroll  To: Shweta San MD  Sent: 2/6/2018 9:30 AM EST  Subject: Prescription Question    In need of a refill for Spiriva. It was prescribed originally by the [facility].   [facility]. [phone]  Thank you.

## 2018-02-15 NOTE — LETTER
CHADSVASC Stroke Risk Score = 5, has refused anticoagulants in the past.   We discussed anticoagulation and stroke risk with the patient and daughter at her last visit  She does not recall this conversation and currently is not interested in starting Anticoagulation at this time.   She prefers to stay on her ASA 325mg for now and discuss with her daughter again when she comes in late March.  Denies CVA or TIA like Symptoms.  At risk for Thromboembolic Stroke  Risk 6-7% adjusted stroke rate (annual percent)  Patient understands risk.       Pulmonary, Critical Care & Sleep  [facility], [address] [place] [address],[address] [address]  Phone: [phone]  Fax: [phone]      October 2, 2017       Patient: Jeramy Cuevas   MR Number: [id number]   YOB: 1931   Date of Visit: 10/2/2017       Dear Dr. Daphne Villagomez:    I saw Mrs. Jeramy Cuevas today for HFU visit. Below are the relevant portions of my assessment and plan of care. Assessment:    1. Hospital discharge follow-up     2. COPD, mild ([address]     3. Abnormal CT scan, chest           Plan:    1. I discussed with patient the above diagnosis in details and reviewed his recent hospital stay and related test results  2. I reviewed her CT chest and recent CT neck  3. Her pneumonia probably related to aspiration due to tonsil mass  4. She is scheduled to F/U with Dr. Sandip Morris later this month and possible biopsy if not improved  5. I recommended F/U CT chest as well in  as 3 months F/U  6. Continue Spiriva daily and Albuterol PRN  7. RTC in 2-3 months      If you have questions, please do not hesitate to call me. I look forward to following Ming Austin along with you.     Sincerely,        Itz Mckeon MD    CC providers:  Shaun Rice, [address] [facility] [address] [place] [address]

## 2018-02-16 ENCOUNTER — TELEPHONE (OUTPATIENT)
Dept: PULMONOLOGY | Age: 83
End: 2018-02-16

## 2018-02-19 NOTE — TELEPHONE ENCOUNTER
Pt informed that she had a CTA Pulmonary on 11/6/17 and 1/13/18 and should not need another CT before her March appt

## 2018-02-22 ENCOUNTER — OFFICE VISIT (OUTPATIENT)
Dept: FAMILY MEDICINE CLINIC | Age: 83
End: 2018-02-22

## 2018-02-22 VITALS
WEIGHT: 105 LBS | SYSTOLIC BLOOD PRESSURE: 140 MMHG | TEMPERATURE: 96.7 F | BODY MASS INDEX: 19.84 KG/M2 | HEART RATE: 79 BPM | DIASTOLIC BLOOD PRESSURE: 78 MMHG | OXYGEN SATURATION: 92 %

## 2018-02-22 DIAGNOSIS — R10.30 LOWER ABDOMINAL PAIN: ICD-10-CM

## 2018-02-22 DIAGNOSIS — M48.061 SPINAL STENOSIS OF LUMBAR REGION, UNSPECIFIED WHETHER NEUROGENIC CLAUDICATION PRESENT: ICD-10-CM

## 2018-02-22 DIAGNOSIS — F33.0 MILD EPISODE OF RECURRENT MAJOR DEPRESSIVE DISORDER (HCC): ICD-10-CM

## 2018-02-22 DIAGNOSIS — R32 URINARY INCONTINENCE, UNSPECIFIED TYPE: Primary | ICD-10-CM

## 2018-02-22 LAB
BACTERIA URINE, POC: 0
BILIRUBIN URINE: 0 MG/DL
BLOOD, URINE: POSITIVE
CASTS URINE, POC: 0
CLARITY: CLEAR
COLOR: YELLOW
CRYSTALS URINE, POC: 0
EPI CELLS URINE, POC: 0
GLUCOSE URINE: NEGATIVE
KETONES, URINE: NEGATIVE
LEUKOCYTE EST, POC: NORMAL
NITRITE, URINE: NEGATIVE
PH UA: 5.5 (ref 4.5–8)
PROTEIN UA: NEGATIVE
RBC URINE, POC: NORMAL
SPECIFIC GRAVITY UA: 1.01 (ref 1–1.03)
UROBILINOGEN, URINE: NORMAL
WBC URINE, POC: NORMAL
YEAST URINE, POC: 0

## 2018-02-22 PROCEDURE — 81000 URINALYSIS NONAUTO W/SCOPE: CPT | Performed by: FAMILY MEDICINE

## 2018-02-22 PROCEDURE — 99214 OFFICE O/P EST MOD 30 MIN: CPT | Performed by: FAMILY MEDICINE

## 2018-02-22 RX ORDER — SERTRALINE HYDROCHLORIDE 100 MG/1
100 TABLET, FILM COATED ORAL DAILY
Qty: 30 TABLET | Refills: 3 | Status: SHIPPED | OUTPATIENT
Start: 2018-02-22 | End: 2018-06-20 | Stop reason: SDUPTHER

## 2018-02-22 NOTE — PROGRESS NOTES
Subjective:      Patient ID: Akilah Holguin is a 80 y.o. female. HPI patient presents today for pelvic pain, incontinence, buttock pain and bilateral leg pain, feels \"off balance\". Here with Having low abd pain in pelvis x 1 week. At night hurts in low abd and around groin, having more times where can't hold urine at all. Burns a little when urinates. Wearing pad. No nausea, vomting or fever. Feeling bloated. Eating well. Pain in abd improves if urinates or has a BM. Stool is formed but going 3 times a day. No stool incontinence but urgency when has to go. Doing kegels everyday. Tried walking but after few days pain in buttock and backs of legs. Taking tylenol and helps sometimes. Pt states mood is ok but family and patient report not wanting to do things. Will go see great grand kids, pt states that is the highlight of her life. Usually goes 2-3 times a week. Enjoys family as well but mostly just watches TV. Per her daughter in a [employer] Message:  Regarding Thursday, 2/22/18 appt.   I will not be with Mom on this visit, but have concerns. 1.  Short term memory is worse   2.  Still having at least 3  Bowel Movements daily   3.  Pain in legs (behind knees and back of thighs)   4.  Lack of interest in doing anything outside the house. 5. Shortness of breath is worse since last visit.  She sees Dr. Teddy Ghotra on 3/12/18. 6.  BP continues to be a problem. 7.  Pain in belly the past few days, along with swelling.   Bloated. 8.  Pain in area of ovaries - not sure if she still has them due to hysterectomy a few years ago. 9.  Very unsteady when walking the past few days.    I ordered a walker with a seat and it should be here next week. 10.  Not walking or doing exercises. Thank you.    Yun Sutton     Review of Systems    Objective:   Physical Exam  Vitals:    02/22/18 0933   BP: (!) 140/78   Site: Left Arm   Position: Sitting   Cuff Size: Large Adult   Pulse: 79   Temp: 96.7 °F (35.9 °C)

## 2018-02-24 ENCOUNTER — HOSPITAL ENCOUNTER (OUTPATIENT)
Dept: OTHER | Age: 83
Discharge: HOME | End: 2018-02-24
Attending: FAMILY MEDICINE | Admitting: FAMILY MEDICINE

## 2018-02-24 DIAGNOSIS — R10.30 LOWER ABDOMINAL PAIN: ICD-10-CM

## 2018-02-24 LAB — URINE CULTURE, ROUTINE: NORMAL

## 2018-02-27 ENCOUNTER — TELEPHONE (OUTPATIENT)
Dept: FAMILY MEDICINE CLINIC | Age: 83
End: 2018-02-27

## 2018-02-27 DIAGNOSIS — R10.30 LOWER ABDOMINAL PAIN: Primary | ICD-10-CM

## 2018-02-27 NOTE — TELEPHONE ENCOUNTER
----- Message from Gretchen Callejas MD sent at 2/27/2018  8:04 AM EST -----  Check with pt how feeling.  If still having pain get CT abd and pelvis with oral contrast no IV

## 2018-03-02 RX ORDER — FLECAINIDE ACETATE 50 MG/1
50 TABLET ORAL EVERY 12 HOURS SCHEDULED
Qty: 60 TABLET | Refills: 3 | Status: SHIPPED | OUTPATIENT
Start: 2018-03-02 | End: 2018-06-20 | Stop reason: SDUPTHER

## 2018-03-05 ENCOUNTER — HOSPITAL ENCOUNTER (OUTPATIENT)
Dept: CT IMAGING | Age: 83
Discharge: HOME | End: 2018-03-05
Attending: FAMILY MEDICINE | Admitting: FAMILY MEDICINE

## 2018-03-05 DIAGNOSIS — R10.30 LOWER ABDOMINAL PAIN: ICD-10-CM

## 2018-03-12 ENCOUNTER — TELEPHONE (OUTPATIENT)
Dept: FAMILY MEDICINE CLINIC | Age: 83
End: 2018-03-12

## 2018-03-12 NOTE — TELEPHONE ENCOUNTER
I am not comfortable increasing her meds more, would rec she call cards and see if they have any recommendations. Make sure drinking plenty of fluids, increase salt and wear compression stockings. Be careful getting up.

## 2018-03-13 RX ORDER — FERROUS SULFATE 325(65) MG
TABLET ORAL
Qty: 30 TABLET | Refills: 3 | Status: SHIPPED | OUTPATIENT
Start: 2018-03-13 | End: 2018-05-14 | Stop reason: SDUPTHER

## 2018-03-19 ENCOUNTER — OFFICE VISIT (OUTPATIENT)
Dept: PULMONOLOGY | Age: 83
End: 2018-03-19

## 2018-03-19 VITALS
WEIGHT: 107 LBS | SYSTOLIC BLOOD PRESSURE: 121 MMHG | BODY MASS INDEX: 20.2 KG/M2 | HEART RATE: 84 BPM | OXYGEN SATURATION: 84 % | HEIGHT: 61 IN | RESPIRATION RATE: 18 BRPM | DIASTOLIC BLOOD PRESSURE: 68 MMHG

## 2018-03-19 DIAGNOSIS — J44.9 COPD, MILD (HCC): Primary | ICD-10-CM

## 2018-03-19 DIAGNOSIS — T17.908A ASPIRATION INTO AIRWAY, INITIAL ENCOUNTER: ICD-10-CM

## 2018-03-19 PROCEDURE — 99213 OFFICE O/P EST LOW 20 MIN: CPT | Performed by: INTERNAL MEDICINE

## 2018-03-19 RX ORDER — LEVALBUTEROL TARTRATE 45 UG/1
1 AEROSOL, METERED ORAL EVERY 4 HOURS PRN
Qty: 1 INHALER | Refills: 3 | Status: SHIPPED | OUTPATIENT
Start: 2018-03-19 | End: 2019-01-01

## 2018-03-19 NOTE — LETTER
Pulmonary, Critical Care & Sleep  [facility], [address] [facility] [address]  Phone: [phone]  Fax: [phone]      March 19, 2018       Patient: Celia Thorne   MR Number: [id number]   YOB: 1931   Date of Visit: 3/19/2018       Dear Dr. Jonathan Mcneal MD      I saw Mrs. Celia Thorne today for follow-up visit. Below are the relevant portions of my assessment and plan of care. Assessment:    1. COPD, mild ([address]     2. Aspiration into airway, initial encounter           Plan:     1. She continues to have episodes of choking and difficulty swallowing  2. I'll order a modified barium swallow and refer to speech therapy as needed  3. She was taken off albuterol due to episodes of shaking and nervousness  4. I I will give her an order for Xopenex to try  5. Continue Spiriva daily   6. RTC in 6 months, sooner if needed      If you have questions, please do not hesitate to call me. I look forward to following Natalie Oscar along with you.           Sincerely,        Katt Lazcano MD    CC providers:  Jonathan Mcneal, [address] [facility] [address] [place] [address]

## 2018-03-19 NOTE — PROGRESS NOTES
systemic atherosclerosis.  No mediastinal nor hilar   lymphadenopathy partially calcified subcarinal and right hilar lymph nodes,   likely sequelae of prior granulomatous disease.       LUNGS/PLEURA: Patent central airways.  Patchy mucous secretions in left upper   lobe bronchi.  Minimal central bronchial wall thickening bilaterally. Unchanged biapical pleuroparenchymal scarring with associated traction   bronchiolectasis.  Calcified granuloma in the right middle lobe.  Subpleural   reticulations especially on the right with some apical sparing, suggesting   indeterminate early fibrotic change.  Gravity dependent atelectasis in the   lower lobes.  No pleural effusions nor pneumothoraces.       UPPER ABDOMEN: Hepatic and splenic granulomatous calcifications.  Splenic   atrophy.       SOFT TISSUES/BONES: No supraclavicular nor axillary lymphadenopathy.  Diffuse   osseous demineralization.  No acute fractures nor suspicious osseous lesions.           Impression   1. No findings of pulmonary embolism.    2. Patchy left upper lobe bronchial secretions with minimal bilateral central   bronchial wall thickening, suggesting bronchitis       She denies previous hx of smoking but she has hx of exposure to second hand smoking  She does have hx of allergies as well  She was found to have some tonsil mass or cyst and causing difficulty swallowing  She is followed by Dr. Jeanette Stanford  She is on Spiriva and Albuterol now  PFT 2016 showed mild obstructive changes  She continued to have episodes of choking and difficulty swallowing    Past Medical History:   Diagnosis Date    Clostridium difficile diarrhea 10/21/2017    History of traumatic head injury 8/19/2014    Personal history of malignant neoplasm of cervix uteri 6/6/2013    Personal history of PE (pulmonary embolism)     post surgical     Current Outpatient Prescriptions   Medication Sig Dispense Refill    ferrous sulfate 325 (65 Fe) MG tablet TAKE 1 TABLET BY MOUTH EVERY DAY tenderness. Lymphadenopathy:    No cervical adenopathy. No axillary adenopathy. Neurological: Alert and oriented. Normal reflexes. No cranial nerve deficit. Normal muscle tone. Coordination normal.   Skin: Skin is warm and dry. No rash noted. No erythema. Psychiatric: Normal mood and affect. Behavior is normal. Thought content normal.        Assessment:    1. COPD, mild ([address]     2. Aspiration into airway, initial encounter             Plan:  1. She continues to have episodes of choking and difficulty swallowing  2. I'll order a modified barium swallow and refer to speech therapy as needed  3. She was taken off albuterol due to episodes of shaking and nervousness  4. I I will give her an order for Xopenex to try  5. Continue Spiriva daily   6.  RTC in 6 months, sooner if needed

## 2018-03-19 NOTE — COMMUNICATION BODY
Assessment:     Assessment:    1. COPD, mild ([address]     2. Aspiration into airway, initial encounter           Plan:     Plan:  1. She continues to have episodes of choking and difficulty swallowing  2. I'll order a modified barium swallow and refer to speech therapy as needed  3. She was taken off albuterol due to episodes of shaking and nervousness  4. I I will give her an order for Xopenex to try  5. Continue Spiriva daily   6.  RTC in 6 months, sooner if needed

## 2018-03-22 ENCOUNTER — TELEPHONE (OUTPATIENT)
Dept: CARDIOLOGY CLINIC | Age: 83
End: 2018-03-22

## 2018-03-22 NOTE — TELEPHONE ENCOUNTER
Left message on recorder to let them know if she is having problems she can come in earlier if she wishes.

## 2018-03-26 ENCOUNTER — HOSPITAL ENCOUNTER (OUTPATIENT)
Dept: SPEECH THERAPY | Age: 83
Discharge: HOME | End: 2018-03-26
Attending: INTERNAL MEDICINE | Admitting: INTERNAL MEDICINE

## 2018-03-26 DIAGNOSIS — T17.908A ASPIRATION INTO AIRWAY, INITIAL ENCOUNTER: ICD-10-CM

## 2018-03-26 NOTE — PROCEDURES
soup, no vegetable soup, no cold cereal), No Drinking Straw. Pt may also benefit from speech therapy for dysphagia tx. Aspiration/Penetration Risk:  Mildly elevated     Recommendations:    Diet Level: Regular \"soft\" texture diet with thin liquids, meds with puree, No mixed consistencies (No chicken noodle soup, no vegetable soup, no cold cereal), No Drinking Straw   Strategies:  Upright 90 degrees at meals; No Straws;  Meds with puree; Small bites/sips  Treatments: Speech Therapy for dysphagia treatment     Consistencies given: Thin, Nectar, Honey thick, Puree, Soft solid, Solid    Oral Phase   -Decreased A-P propulsion   -Premature bolus loss to pharynx  -Slightly delayed palate retraction      Pharyngeal Phase  -Delayed swallow onset  -Pooling to the vallecular space with solids   -Decreased anterior hyolaryngeal movement   -Delayed epiglottic distension  -Decreased epiglottic ROM for airway protection   -Trace laryngeal penetration with thin and nectar thick liquids   -No aspiration was viewed   -Trace pharyngeal stasis   -Intermittent double clearing swallows assessed     Esophageal Phase  -Slightly delayed clearing through upper 1/3rd of the esophagus     Following Evaluation:  Results/recommendations and education given to Patient and nurse, who verbalized understanding    Timed Code Treatment:  0 minutes     Total Treatment Time:  45 minutes     Anna Powell MA CCC-SLP #[id number]  Speech Language Pathologist

## 2018-04-03 ENCOUNTER — HOSPITAL ENCOUNTER (OUTPATIENT)
Dept: OTHER | Age: 83
Discharge: HOME | End: 2018-04-03
Attending: PSYCHIATRY & NEUROLOGY | Admitting: PSYCHIATRY & NEUROLOGY

## 2018-04-03 ENCOUNTER — OFFICE VISIT (OUTPATIENT)
Dept: FAMILY MEDICINE CLINIC | Age: 83
End: 2018-04-03

## 2018-04-03 ENCOUNTER — OFFICE VISIT (OUTPATIENT)
Dept: NEUROLOGY | Age: 83
End: 2018-04-03

## 2018-04-03 VITALS
SYSTOLIC BLOOD PRESSURE: 122 MMHG | WEIGHT: 107.4 LBS | DIASTOLIC BLOOD PRESSURE: 70 MMHG | HEART RATE: 80 BPM | BODY MASS INDEX: 20.28 KG/M2 | OXYGEN SATURATION: 95 % | HEIGHT: 61 IN

## 2018-04-03 VITALS
WEIGHT: 106 LBS | SYSTOLIC BLOOD PRESSURE: 127 MMHG | BODY MASS INDEX: 20.01 KG/M2 | DIASTOLIC BLOOD PRESSURE: 74 MMHG | HEART RATE: 76 BPM | HEIGHT: 61 IN

## 2018-04-03 DIAGNOSIS — R53.82 CHRONIC FATIGUE: ICD-10-CM

## 2018-04-03 DIAGNOSIS — M48.062 SPINAL STENOSIS OF LUMBAR REGION WITH NEUROGENIC CLAUDICATION: ICD-10-CM

## 2018-04-03 DIAGNOSIS — R63.4 WEIGHT LOSS: ICD-10-CM

## 2018-04-03 DIAGNOSIS — F32.A DEPRESSION, UNSPECIFIED DEPRESSION TYPE: ICD-10-CM

## 2018-04-03 DIAGNOSIS — R56.9 PARTIAL SEIZURES (HCC): Primary | ICD-10-CM

## 2018-04-03 DIAGNOSIS — R13.12 OROPHARYNGEAL DYSPHAGIA: ICD-10-CM

## 2018-04-03 DIAGNOSIS — E53.8 B12 DEFICIENCY: ICD-10-CM

## 2018-04-03 DIAGNOSIS — I95.1 ORTHOSTATIC HYPOTENSION: Primary | ICD-10-CM

## 2018-04-03 DIAGNOSIS — F33.0 MILD EPISODE OF RECURRENT MAJOR DEPRESSIVE DISORDER (HCC): ICD-10-CM

## 2018-04-03 DIAGNOSIS — M51.36 DDD (DEGENERATIVE DISC DISEASE), LUMBAR: ICD-10-CM

## 2018-04-03 LAB — VITAMIN B-12: >2000 PG/ML (ref 211–911)

## 2018-04-03 PROCEDURE — 99214 OFFICE O/P EST MOD 30 MIN: CPT | Performed by: FAMILY MEDICINE

## 2018-04-03 PROCEDURE — 99214 OFFICE O/P EST MOD 30 MIN: CPT | Performed by: PSYCHIATRY & NEUROLOGY

## 2018-04-03 RX ORDER — MEGESTROL ACETATE 40 MG/ML
50 SUSPENSION ORAL DAILY
Qty: 240 ML | Refills: 3 | Status: SHIPPED | OUTPATIENT
Start: 2018-04-03 | End: 2018-05-08 | Stop reason: SDUPTHER

## 2018-04-03 RX ORDER — CYCLOBENZAPRINE HCL 10 MG
10 TABLET ORAL 3 TIMES DAILY PRN
Qty: 20 TABLET | Refills: 0 | Status: CANCELLED | OUTPATIENT
Start: 2018-04-03 | End: 2018-04-10

## 2018-04-16 ENCOUNTER — PROCEDURE VISIT (OUTPATIENT)
Dept: CARDIOLOGY CLINIC | Age: 83
End: 2018-04-16

## 2018-04-16 DIAGNOSIS — Z95.0 PACEMAKER: ICD-10-CM

## 2018-04-16 DIAGNOSIS — I48.0 PAROXYSMAL ATRIAL FIBRILLATION (HCC): ICD-10-CM

## 2018-04-16 PROCEDURE — 93280 PM DEVICE PROGR EVAL DUAL: CPT | Performed by: INTERNAL MEDICINE

## 2018-04-19 PROBLEM — R42 DIZZINESS: Status: ACTIVE | Noted: 2018-04-19

## 2018-04-20 PROBLEM — I95.1 ORTHOSTASIS: Status: ACTIVE | Noted: 2018-04-19

## 2018-04-22 ENCOUNTER — CARE COORDINATION (OUTPATIENT)
Dept: CASE MANAGEMENT | Age: 83
End: 2018-04-22

## 2018-04-23 ENCOUNTER — TELEPHONE (OUTPATIENT)
Dept: FAMILY MEDICINE CLINIC | Age: 83
End: 2018-04-23

## 2018-04-25 ENCOUNTER — TELEPHONE (OUTPATIENT)
Dept: FAMILY MEDICINE CLINIC | Age: 83
End: 2018-04-25

## 2018-04-25 ENCOUNTER — CARE COORDINATION (OUTPATIENT)
Dept: CASE MANAGEMENT | Age: 83
End: 2018-04-25

## 2018-04-25 DIAGNOSIS — I95.1 ORTHOSTATIC HYPOTENSION: Primary | ICD-10-CM

## 2018-04-25 PROCEDURE — 1111F DSCHRG MED/CURRENT MED MERGE: CPT | Performed by: FAMILY MEDICINE

## 2018-04-30 ENCOUNTER — CARE COORDINATION (OUTPATIENT)
Dept: CASE MANAGEMENT | Age: 83
End: 2018-04-30

## 2018-05-03 ENCOUNTER — TELEPHONE (OUTPATIENT)
Dept: FAMILY MEDICINE CLINIC | Age: 83
End: 2018-05-03

## 2018-05-07 ENCOUNTER — TELEPHONE (OUTPATIENT)
Dept: FAMILY MEDICINE CLINIC | Age: 83
End: 2018-05-07

## 2018-05-08 ENCOUNTER — TELEPHONE (OUTPATIENT)
Dept: FAMILY MEDICINE CLINIC | Age: 83
End: 2018-05-08

## 2018-05-08 ENCOUNTER — OFFICE VISIT (OUTPATIENT)
Dept: FAMILY MEDICINE CLINIC | Age: 83
End: 2018-05-08

## 2018-05-08 VITALS
DIASTOLIC BLOOD PRESSURE: 80 MMHG | OXYGEN SATURATION: 90 % | WEIGHT: 109.8 LBS | HEART RATE: 83 BPM | BODY MASS INDEX: 20.75 KG/M2 | SYSTOLIC BLOOD PRESSURE: 156 MMHG

## 2018-05-08 DIAGNOSIS — I95.1 ORTHOSTATIC HYPOTENSION: Primary | ICD-10-CM

## 2018-05-08 DIAGNOSIS — M48.062 SPINAL STENOSIS OF LUMBAR REGION WITH NEUROGENIC CLAUDICATION: ICD-10-CM

## 2018-05-08 DIAGNOSIS — R63.4 WEIGHT LOSS: ICD-10-CM

## 2018-05-08 PROCEDURE — 99213 OFFICE O/P EST LOW 20 MIN: CPT | Performed by: FAMILY MEDICINE

## 2018-05-08 RX ORDER — MEGESTROL ACETATE 40 MG/ML
20 SUSPENSION ORAL DAILY
Qty: 240 ML | Refills: 3 | Status: SHIPPED | OUTPATIENT
Start: 2018-05-08 | End: 2018-06-09 | Stop reason: DRUGHIGH

## 2018-05-11 ENCOUNTER — TELEPHONE (OUTPATIENT)
Dept: RHEUMATOLOGY | Age: 83
End: 2018-05-11

## 2018-05-11 ENCOUNTER — TELEPHONE (OUTPATIENT)
Dept: PULMONOLOGY | Age: 83
End: 2018-05-11

## 2018-05-14 RX ORDER — FERROUS SULFATE 325(65) MG
325 TABLET ORAL
Qty: 30 TABLET | Refills: 3 | Status: SHIPPED | OUTPATIENT
Start: 2018-05-14 | End: 2018-11-11 | Stop reason: SDUPTHER

## 2018-05-31 RX ORDER — MIDODRINE HYDROCHLORIDE 2.5 MG/1
2.5 TABLET ORAL 3 TIMES DAILY
Qty: 90 TABLET | Refills: 5 | Status: SHIPPED | OUTPATIENT
Start: 2018-05-31 | End: 2018-07-03 | Stop reason: SDUPTHER

## 2018-05-31 RX ORDER — TIOTROPIUM BROMIDE 18 UG/1
CAPSULE ORAL; RESPIRATORY (INHALATION)
Qty: 30 CAPSULE | Refills: 5 | Status: SHIPPED | OUTPATIENT
Start: 2018-05-31 | End: 2018-09-07

## 2018-06-09 ENCOUNTER — OFFICE VISIT (OUTPATIENT)
Dept: FAMILY MEDICINE CLINIC | Age: 83
End: 2018-06-09

## 2018-06-09 VITALS
TEMPERATURE: 97.3 F | WEIGHT: 105 LBS | SYSTOLIC BLOOD PRESSURE: 100 MMHG | BODY MASS INDEX: 19.84 KG/M2 | DIASTOLIC BLOOD PRESSURE: 68 MMHG | OXYGEN SATURATION: 97 % | HEART RATE: 94 BPM

## 2018-06-09 DIAGNOSIS — R35.0 URINARY FREQUENCY: Primary | ICD-10-CM

## 2018-06-09 DIAGNOSIS — N30.00 ACUTE CYSTITIS WITHOUT HEMATURIA: Primary | ICD-10-CM

## 2018-06-09 LAB
BACTERIA URINE, POC: NORMAL
BILIRUBIN URINE: 0 MG/DL
BLOOD, URINE: POSITIVE
CASTS URINE, POC: 0
CLARITY: CLEAR
COLOR: YELLOW
CRYSTALS URINE, POC: 0
EPI CELLS URINE, POC: 0
GLUCOSE URINE: NEGATIVE
KETONES, URINE: NEGATIVE
LEUKOCYTE EST, POC: POSITIVE
NITRITE, URINE: NEGATIVE
PH UA: 5.5 (ref 4.5–8)
PROTEIN UA: NEGATIVE
RBC URINE, POC: 0
SPECIFIC GRAVITY UA: 1.01 (ref 1–1.03)
UROBILINOGEN, URINE: NORMAL
WBC URINE, POC: NORMAL
YEAST URINE, POC: 0

## 2018-06-09 PROCEDURE — 81000 URINALYSIS NONAUTO W/SCOPE: CPT | Performed by: FAMILY MEDICINE

## 2018-06-09 PROCEDURE — 99213 OFFICE O/P EST LOW 20 MIN: CPT | Performed by: FAMILY MEDICINE

## 2018-06-09 RX ORDER — AMOXICILLIN 500 MG/1
500 CAPSULE ORAL 2 TIMES DAILY
Qty: 10 CAPSULE | Refills: 0 | Status: SHIPPED | OUTPATIENT
Start: 2018-06-09 | End: 2018-06-14

## 2018-06-09 RX ORDER — MEGESTROL ACETATE 40 MG/ML
40 SUSPENSION ORAL DAILY
Qty: 240 ML | Refills: 3 | Status: SHIPPED | OUTPATIENT
Start: 2018-06-09 | End: 2018-08-07 | Stop reason: SDUPTHER

## 2018-06-13 ENCOUNTER — TELEPHONE (OUTPATIENT)
Dept: FAMILY MEDICINE CLINIC | Age: 83
End: 2018-06-13

## 2018-06-14 ENCOUNTER — NURSE ONLY (OUTPATIENT)
Dept: FAMILY MEDICINE CLINIC | Age: 83
End: 2018-06-14

## 2018-06-14 DIAGNOSIS — N30.00 ACUTE CYSTITIS WITHOUT HEMATURIA: Primary | ICD-10-CM

## 2018-06-14 LAB
APPEARANCE FLUID: NORMAL
BILIRUBIN, POC: NEGATIVE
BLOOD URINE, POC: NORMAL
CLARITY, POC: CLEAR
COLOR, POC: YELLOW
GLUCOSE URINE, POC: NEGATIVE
KETONES, POC: NEGATIVE
LEUKOCYTE EST, POC: NEGATIVE
NITRITE, POC: NEGATIVE
PH, POC: 5.5
PROTEIN, POC: NEGATIVE
SPECIFIC GRAVITY, POC: 1.01
UROBILINOGEN, POC: 0.2

## 2018-06-14 PROCEDURE — 81002 URINALYSIS NONAUTO W/O SCOPE: CPT | Performed by: FAMILY MEDICINE

## 2018-06-16 LAB — URINE CULTURE, ROUTINE: NORMAL

## 2018-06-19 ENCOUNTER — HOSPITAL ENCOUNTER (OUTPATIENT)
Dept: OTHER | Age: 83
Discharge: HOME | End: 2018-06-19
Attending: FAMILY MEDICINE | Admitting: FAMILY MEDICINE

## 2018-06-19 ENCOUNTER — TELEPHONE (OUTPATIENT)
Dept: FAMILY MEDICINE CLINIC | Age: 83
End: 2018-06-19

## 2018-06-19 DIAGNOSIS — R10.9 ABDOMINAL PAIN, UNSPECIFIED ABDOMINAL LOCATION: Primary | ICD-10-CM

## 2018-06-19 DIAGNOSIS — R10.9 ABDOMINAL PAIN, UNSPECIFIED ABDOMINAL LOCATION: ICD-10-CM

## 2018-06-20 RX ORDER — FLECAINIDE ACETATE 50 MG/1
50 TABLET ORAL EVERY 12 HOURS SCHEDULED
Qty: 60 TABLET | Refills: 2 | Status: SHIPPED | OUTPATIENT
Start: 2018-06-20 | End: 2018-09-07 | Stop reason: SDUPTHER

## 2018-06-20 RX ORDER — SERTRALINE HYDROCHLORIDE 100 MG/1
100 TABLET, FILM COATED ORAL DAILY
Qty: 30 TABLET | Refills: 2 | Status: SHIPPED | OUTPATIENT
Start: 2018-06-20 | End: 2018-07-03 | Stop reason: DRUGHIGH

## 2018-06-28 RX ORDER — LEVETIRACETAM 750 MG/1
TABLET ORAL
Qty: 360 TABLET | Refills: 2 | Status: SHIPPED | OUTPATIENT
Start: 2018-06-28 | End: 2018-07-01

## 2018-06-28 RX ORDER — LEVOTHYROXINE SODIUM 0.05 MG/1
TABLET ORAL
Qty: 90 TABLET | Refills: 3 | Status: SHIPPED | OUTPATIENT
Start: 2018-06-28 | End: 2019-01-01 | Stop reason: SDUPTHER

## 2018-07-01 PROBLEM — R07.9 CHEST PAIN: Status: ACTIVE | Noted: 2018-07-01

## 2018-07-03 ENCOUNTER — CARE COORDINATION (OUTPATIENT)
Dept: CASE MANAGEMENT | Age: 83
End: 2018-07-03

## 2018-07-03 ENCOUNTER — OFFICE VISIT (OUTPATIENT)
Dept: FAMILY MEDICINE CLINIC | Age: 83
End: 2018-07-03

## 2018-07-03 VITALS
WEIGHT: 107 LBS | DIASTOLIC BLOOD PRESSURE: 80 MMHG | BODY MASS INDEX: 20.22 KG/M2 | OXYGEN SATURATION: 97 % | SYSTOLIC BLOOD PRESSURE: 130 MMHG | HEART RATE: 70 BPM

## 2018-07-03 DIAGNOSIS — R07.9 CHEST PAIN, UNSPECIFIED TYPE: Primary | ICD-10-CM

## 2018-07-03 DIAGNOSIS — E03.9 ACQUIRED HYPOTHYROIDISM: ICD-10-CM

## 2018-07-03 DIAGNOSIS — R19.7 DIARRHEA, UNSPECIFIED TYPE: ICD-10-CM

## 2018-07-03 DIAGNOSIS — I95.1 ORTHOSTATIC HYPOTENSION: ICD-10-CM

## 2018-07-03 PROCEDURE — 99214 OFFICE O/P EST MOD 30 MIN: CPT | Performed by: FAMILY MEDICINE

## 2018-07-03 PROCEDURE — 1111F DSCHRG MED/CURRENT MED MERGE: CPT

## 2018-07-03 RX ORDER — SERTRALINE HYDROCHLORIDE 100 MG/1
50 TABLET, FILM COATED ORAL DAILY
Qty: 30 TABLET | Refills: 2 | Status: SHIPPED
Start: 2018-07-03 | End: 2018-08-07 | Stop reason: SDUPTHER

## 2018-07-05 NOTE — CARE COORDINATION
[address] Transitions Initial Follow Up Call    Call within 2 business days of discharge: Yes    Patient: Duc Zapata Patient : 1931   MRN: [id number]  Reason for Admission: Chest Pain  Discharge Date: 18 RARS: Readmission Risk Score: 15     Spoke with: [address]: [facility]    Non-face-to-face services provided:  Obtained and reviewed discharge summary and/or continuity of care documents  Assessment and support for treatment adherence and medication management-medication review, reconciliation, hospital follow up appt  1111f completed    Care Transitions 24 Hour Call    Do you have any ongoing symptoms?:  No  Do you have a copy of your discharge instructions?:  Yes  Do you have all of your prescriptions and are they filled?:  Yes  Have you been contacted by a [employer] [address]?:  No  Have you scheduled your follow up appointment?:  Yes  How are you going to get to your appointment?:  Car - family or friend to transport  Were you discharged with any Home Care or Post Acute Services:  No  Post Acute Services:  Home Health (Comment: [facility])  Patient DME:  Marta Boalfonzoe, Straight cane, Wheelchair  Do you have support at home?:  Child  Do you feel like you have everything you need to keep you well at home?:  Yes  Are you an active caregiver in your home?:  No  Care Transitions Interventions  No Identified Needs       Reports Laurent Kelley is doing well,lives with family,  no concerns, no episodes of chest pain. Has nitroglycerin and understands how to use it.   Still feels weak; is using walker and cane  Medication review completed  Discharge instructions reviewed  Hospital follow up with Cardiology is tomorrow 18  @ 3:15  Follow Up  Future Appointments  Date Time Provider Vikki Alcala   2018 3:15 PM Rajendra Villafuerte MD [facility]   2018 1:15 PM SCHEDULE, Flinto PHONE TRANSMISSION [facility]   2018 2:30 PM Leigh Jin MD [facility]   2018 3:15 PM Anuj

## 2018-07-06 ENCOUNTER — OFFICE VISIT (OUTPATIENT)
Dept: CARDIOLOGY CLINIC | Age: 83
End: 2018-07-06

## 2018-07-06 ENCOUNTER — HOSPITAL ENCOUNTER (OUTPATIENT)
Dept: OTHER | Age: 83
Discharge: HOME | End: 2018-07-06
Attending: FAMILY MEDICINE | Admitting: FAMILY MEDICINE

## 2018-07-06 VITALS
BODY MASS INDEX: 20.39 KG/M2 | SYSTOLIC BLOOD PRESSURE: 140 MMHG | HEIGHT: 61 IN | WEIGHT: 108 LBS | DIASTOLIC BLOOD PRESSURE: 86 MMHG | HEART RATE: 66 BPM

## 2018-07-06 DIAGNOSIS — I10 ESSENTIAL HYPERTENSION: ICD-10-CM

## 2018-07-06 DIAGNOSIS — R07.9 CHEST PAIN, UNSPECIFIED TYPE: Primary | ICD-10-CM

## 2018-07-06 DIAGNOSIS — I48.0 PAROXYSMAL ATRIAL FIBRILLATION (HCC): ICD-10-CM

## 2018-07-06 DIAGNOSIS — E03.9 ACQUIRED HYPOTHYROIDISM: ICD-10-CM

## 2018-07-06 LAB — TSH REFLEX: 1.76 UIU/ML (ref 0.27–4.2)

## 2018-07-06 PROCEDURE — 99214 OFFICE O/P EST MOD 30 MIN: CPT | Performed by: INTERNAL MEDICINE

## 2018-07-06 NOTE — LETTER
[address] [address] [address] [address]  Phone: [phone]  Fax: [phone]    Geneva Nash MD        July 6, 2018     Nabila Montes De Oca, [address]    Patient: Mahnaz Schaefer  MR Number: [id number]  YOB: 1931  Date of Visit: 7/6/2018    Dear Dr. Nabila Montes De Oca:    Thank you for the request for consultation for Mahnaz Schaefer to me for the evaluation of CP. Below are the relevant portions of my assessment and plan of care. [address]       H+P // CONSULT // OUTPATIENT VISIT // Diamond Jensen     Referring Doctor Nabila Montes De Oca MD   Encounter Type Followup     CHIEF COMPLAINT     VisitType [] Acute [x] Chronic     Symptom [] None [x] CP [x] SOB [] Dizzy [] Palps [] Fatigue     Problems Chest pain, pAFIB, Dimitris, PPM, HTN      HISTORY OF PRESENT ILLNESS     FU from hospital for CP, SOB. Declined LHC in hospital.  States still having exertional chest pain and sob. CP with walking to bathroom, relieved with rest.  CP substernal, pressure. Symptom Y N Frequency Duration Severity Modify Assoc Sx Other   CP [x] [] daily mins mod +stress -rest     SOB [x] [] daily mins mod +stress -rest     Dizzy [] [x]         Syncope [] [x]         Palpitations [] [x]           COMPLIANCE     Category Meds Diet Salt Exercise Tobacco Alcohol Drugs   Compliant [x] [x] [x] [x] [x] [x] [x]   [x]Counseling given on all above above categories    HISTORY/ALLERGIES/ROS     MedHx:  has a past medical history of Anemia; Asthma; Blood circulation, collateral; Clostridium difficile diarrhea; Constipation; COPD (chronic obstructive pulmonary disease) (HCC); DDD (degenerative disc disease), cervical; Essential hypertension; GERD (gastroesophageal reflux disease); History of traumatic head injury; Hypothyroidism; Major depressive disorder; Oropharyngeal dysphagia;  Orthostatic hypotension; Osteopenia; Other disorders of kidney and ureter in diseases classified elsewhere; Pacemaker; Paroxysmal atrial fibrillation ([address] Partial epilepsy, with intractable epilepsy, pharmacoresistant ([address] Personal history of malignant neoplasm of cervix uteri; Personal history of PE (pulmonary embolism); Pneumonia; Post traumatic seizure ([address] Seizures ([address] Sinus node dysfunction ([address] and Spinal stenosis of lumbar region. SurgHx:  has a past surgical history that includes refugio and bso (cervix removed) (9/2010); Tubal ligation; Rotator cuff repair (Right); Upper gastrointestinal endoscopy (02/2016); Colonoscopy (04/2013); pacemaker placement (Left); and pacemaker placement (Left). SocHx:   reports that she has never smoked. She has never used smokeless tobacco. She reports that she does not drink alcohol or use drugs. FamHx: family history includes Breast Cancer in her maternal aunt; Cancer in her daughter; High Blood Pressure in her mother; Lung Cancer in her brother; Other in her father; Parkinsonism in her mother; Stroke in her mother. Allergies: Cymbalta [duloxetine hcl] and Sulfa antibiotics   ROS:  [x]Full ROS obtained and negative except as mentioned in HPI     MEDICATIONS      Current Outpatient Prescriptions   Medication Sig Dispense Refill    sertraline (ZOLOFT) 100 MG tablet Take 0.5 tablets by mouth daily 30 tablet 2    nitroGLYCERIN (NITROSTAT) 0.4 MG SL tablet up to max of 3 total doses.  If no relief after 1 dose, call 911. 25 tablet 3    levothyroxine (SYNTHROID) 50 MCG tablet TAKE 1 TABLET BY MOUTH DAILY 90 tablet 3    flecainide (TAMBOCOR) 50 MG tablet TAKE 1 TABLET BY MOUTH EVERY 12 HOURS 60 tablet 2    megestrol (MEGACE ORAL) 40 MG/ML suspension Take 1 mL by mouth daily 240 mL 3    SPIRIVA HANDIHALER 18 MCG inhalation capsule INHALE 1 CAPSULE VIA HANDIHALER ONCE DAILY AT THE SAME TIME EVERY DAY 30 capsule 5    ferrous sulfate 325 (65 Fe) MG tablet Take 1 tablet by mouth daily (with ~Afib/bradycardia/syncope    Date Detail   Hx   S/p pacer due to ILR revealing pauses up to 5s associated with sx   R/R   sinus   R/RM   Reviewed   [facility]S   >1   AC/AP   xarelto   Plan   Last device check 4/18 - 99.7% AS-VS, 9 year batter, no AT/AF  Continued current meds and observation   ~HTN  Today BP [x] Controlled [] Borderline [] Uncontrolled   Counseling [x] Diet/Salt [x] Exercise [x] Weight    Plan     Continue current medications at doses detailed above  ~Followup  []2 wk   []1m   []3m    []6m   []12m    []PRN  [x]Other: after testing    [address], am scribing for and in the presence of Mary Kay Law MD.   SignedEagle 07/06/18 9:36 AM   Provider Jeremie Mireles is working as a scribe for and in the presence of me (Mary Kay Law MD). Working as a scribe, Eagle Aguiar may have prepopulated components of this note with my historical  intellectual property under my direct supervision. Any additions to this intellectual property were performed in my presence and at my direction. Furthermore, the content and accuracy of this note have been reviewed by me Mary Kay Law MD). If you have questions, please do not hesitate to call me. I look forward to following Carlos Grande along with you.     Sincerely,        Darron Sharif MD

## 2018-07-06 NOTE — COMMUNICATION BODY
[address]       H+P // CONSULT // OUTPATIENT VISIT // Bertram Peterson     Referring Doctor Kameron Michaels MD   Encounter Type Followup     CHIEF COMPLAINT     VisitType [] Acute [x] Chronic     Symptom [] None [x] CP [x] SOB [] Dizzy [] Palps [] Fatigue     Problems Chest pain, pAFIB, Dimitris, PPM, HTN      HISTORY OF PRESENT ILLNESS     FU from hospital for CP, SOB. Declined LHC in hospital.  States still having exertional chest pain and sob. CP with walking to bathroom, relieved with rest.  CP substernal, pressure. Symptom Y N Frequency Duration Severity Modify Assoc Sx Other   CP [x] [] daily mins mod +stress -rest     SOB [x] [] daily mins mod +stress -rest     Dizzy [] [x]         Syncope [] [x]         Palpitations [] [x]           COMPLIANCE     Category Meds Diet Salt Exercise Tobacco Alcohol Drugs   Compliant [x] [x] [x] [x] [x] [x] [x]   [x]Counseling given on all above above categories    HISTORY/ALLERGIES/ROS     MedHx:  has a past medical history of Anemia; Asthma; Blood circulation, collateral; Clostridium difficile diarrhea; Constipation; COPD (chronic obstructive pulmonary disease) (HCC); DDD (degenerative disc disease), cervical; Essential hypertension; GERD (gastroesophageal reflux disease); History of traumatic head injury; Hypothyroidism; Major depressive disorder; Oropharyngeal dysphagia; Orthostatic hypotension; Osteopenia; Other disorders of kidney and ureter in diseases classified elsewhere; Pacemaker; Paroxysmal atrial fibrillation ([address] Partial epilepsy, with intractable epilepsy, pharmacoresistant ([address] Personal history of malignant neoplasm of cervix uteri; Personal history of PE (pulmonary embolism); Pneumonia; Post traumatic seizure ([address] Seizures ([address] Sinus node dysfunction ([address] and Spinal stenosis of lumbar region. SurgHx:  has a past surgical history that includes refugio and bso (cervix removed) (9/2010); Tubal ligation; Rotator cuff repair (Right);  Upper

## 2018-07-06 NOTE — PROGRESS NOTES
[address]       H+P // CONSULT // OUTPATIENT VISIT // Kaylin Borja     Referring Doctor Sudhir Whiting MD   Encounter Type Followup     CHIEF COMPLAINT     VisitType [] Acute [x] Chronic     Symptom [] None [x] CP [x] SOB [] Dizzy [] Palps [] Fatigue     Problems Chest pain, pAFIB, Dimitris, PPM, HTN      HISTORY OF PRESENT ILLNESS     FU from hospital for CP, SOB. Declined LHC in hospital.  States still having exertional chest pain and sob. CP with walking to bathroom, relieved with rest.  CP substernal, pressure. Symptom Y N Frequency Duration Severity Modify Assoc Sx Other   CP [x] [] daily mins mod +stress -rest     SOB [x] [] daily mins mod +stress -rest     Dizzy [] [x]         Syncope [] [x]         Palpitations [] [x]           COMPLIANCE     Category Meds Diet Salt Exercise Tobacco Alcohol Drugs   Compliant [x] [x] [x] [x] [x] [x] [x]   [x]Counseling given on all above above categories    HISTORY/ALLERGIES/ROS     MedHx:  has a past medical history of Anemia; Asthma; Blood circulation, collateral; Clostridium difficile diarrhea; Constipation; COPD (chronic obstructive pulmonary disease) (HCC); DDD (degenerative disc disease), cervical; Essential hypertension; GERD (gastroesophageal reflux disease); History of traumatic head injury; Hypothyroidism; Major depressive disorder; Oropharyngeal dysphagia; Orthostatic hypotension; Osteopenia; Other disorders of kidney and ureter in diseases classified elsewhere; Pacemaker; Paroxysmal atrial fibrillation ([address] Partial epilepsy, with intractable epilepsy, pharmacoresistant ([address] Personal history of malignant neoplasm of cervix uteri; Personal history of PE (pulmonary embolism); Pneumonia; Post traumatic seizure ([address] Seizures ([address] Sinus node dysfunction ([address] and Spinal stenosis of lumbar region. SurgHx:  has a past surgical history that includes refugio and bso (cervix removed) (9/2010); Tubal ligation; Rotator cuff repair (Right);  Upper gastrointestinal endoscopy (02/2016); Colonoscopy (04/2013); pacemaker placement (Left); and pacemaker placement (Left). SocHx:   reports that she has never smoked. She has never used smokeless tobacco. She reports that she does not drink alcohol or use drugs. FamHx: family history includes Breast Cancer in her maternal aunt; Cancer in her daughter; High Blood Pressure in her mother; Lung Cancer in her brother; Other in her father; Parkinsonism in her mother; Stroke in her mother. Allergies: Cymbalta [duloxetine hcl] and Sulfa antibiotics   ROS:  [x]Full ROS obtained and negative except as mentioned in HPI     MEDICATIONS      Current Outpatient Prescriptions   Medication Sig Dispense Refill    sertraline (ZOLOFT) 100 MG tablet Take 0.5 tablets by mouth daily 30 tablet 2    nitroGLYCERIN (NITROSTAT) 0.4 MG SL tablet up to max of 3 total doses.  If no relief after 1 dose, call 911. 25 tablet 3    levothyroxine (SYNTHROID) 50 MCG tablet TAKE 1 TABLET BY MOUTH DAILY 90 tablet 3    flecainide (TAMBOCOR) 50 MG tablet TAKE 1 TABLET BY MOUTH EVERY 12 HOURS 60 tablet 2    megestrol (MEGACE ORAL) 40 MG/ML suspension Take 1 mL by mouth daily 240 mL 3    SPIRIVA HANDIHALER 18 MCG inhalation capsule INHALE 1 CAPSULE VIA HANDIHALER ONCE DAILY AT THE SAME TIME EVERY DAY 30 capsule 5    ferrous sulfate 325 (65 Fe) MG tablet Take 1 tablet by mouth daily (with breakfast) 30 tablet 3    levalbuterol (XOPENEX HFA) 45 MCG/ACT inhaler Inhale 1 puff into the lungs every 4 hours as needed for Wheezing 1 Inhaler 3    XARELTO 15 MG TABS tablet TAKE 1 TABLET BY MOUTH EVERY DAY WITH LARGE MEAL OF THE DAY 30 tablet 11    nystatin (MYCOSTATIN) 008883 UNIT/ML suspension Take 5 mLs by mouth 4 times daily 140 mL 6    Cranberry 1000 MG CAPS Take by mouth      midodrine (PROAMATINE) 2.5 MG tablet Take 1 tablet by mouth 3 times daily (Patient taking differently: Take 2.5 mg by mouth 3 times daily ) 90 tablet 3    levETIRAcetam

## 2018-07-11 PROBLEM — I20.0 UNSTABLE ANGINA (HCC): Status: ACTIVE | Noted: 2018-07-11

## 2018-07-13 ENCOUNTER — CARE COORDINATION (OUTPATIENT)
Dept: CASE MANAGEMENT | Age: 83
End: 2018-07-13

## 2018-07-19 ENCOUNTER — CARE COORDINATION (OUTPATIENT)
Dept: CASE MANAGEMENT | Age: 83
End: 2018-07-19

## 2018-08-06 RX ORDER — MEGESTROL ACETATE 40 MG/ML
SUSPENSION ORAL
Qty: 240 ML | Refills: 3 | Status: SHIPPED | OUTPATIENT
Start: 2018-08-06 | End: 2018-08-07 | Stop reason: ALTCHOICE

## 2018-08-07 ENCOUNTER — OFFICE VISIT (OUTPATIENT)
Dept: FAMILY MEDICINE CLINIC | Age: 83
End: 2018-08-07

## 2018-08-07 VITALS
OXYGEN SATURATION: 97 % | SYSTOLIC BLOOD PRESSURE: 118 MMHG | HEART RATE: 87 BPM | BODY MASS INDEX: 21.16 KG/M2 | DIASTOLIC BLOOD PRESSURE: 66 MMHG | WEIGHT: 112 LBS

## 2018-08-07 DIAGNOSIS — J69.0 ASPIRATION PNEUMONIA OF BOTH LOWER LOBES, UNSPECIFIED ASPIRATION PNEUMONIA TYPE (HCC): Primary | ICD-10-CM

## 2018-08-07 DIAGNOSIS — F33.0 MILD EPISODE OF RECURRENT MAJOR DEPRESSIVE DISORDER (HCC): ICD-10-CM

## 2018-08-07 DIAGNOSIS — R63.0 ANOREXIA: ICD-10-CM

## 2018-08-07 DIAGNOSIS — I95.1 ORTHOSTATIC HYPOTENSION: ICD-10-CM

## 2018-08-07 PROCEDURE — 99213 OFFICE O/P EST LOW 20 MIN: CPT | Performed by: FAMILY MEDICINE

## 2018-08-07 RX ORDER — SERTRALINE HYDROCHLORIDE 100 MG/1
100 TABLET, FILM COATED ORAL DAILY
Qty: 30 TABLET | Refills: 6 | Status: SHIPPED | OUTPATIENT
Start: 2018-08-07 | End: 2018-09-07 | Stop reason: SDUPTHER

## 2018-08-07 NOTE — PROGRESS NOTES
Subjective:      Patient ID: Angela Shields is a 80 y.o. female. HPI Patient presents today for ED follow up from [facility]. 8/3/18 She went to ED for choking on a snickers bar-She aspirated. 8/3/18 She went back to ED for coughing up blood. States she is now on a [employer] currently. Does have a touch of pneumonia. Here for hospital f/u. Was in ED on 8/3 after choked on snickers bar. Seemed ok then on 8/5 went back to ED with worsening cough, green sputum and hemoptysis. CT showed ? Bibasilar pneumonia but labs ok, no fever. Started on zpak. Currently feeling better, less cough, still little hoarse. No SOB    Decreased zoloft last visit but can tell a difference, decreased interest, not wanting to do anything, mostly just wanting to spend time with family. Thinks needs to go back on higher dose. Had [address] on 7/11 and looked ok. BP still drops, saw cards but felt meds were optimized and nothing more to do. Review of Systems    Objective:   Physical Exam  Body mass index is 21.16 kg/m². Vitals:    08/07/18 1422   BP: 118/66   Site: Left Arm   Position: Sitting   Cuff Size: Small Adult   Pulse: 87   SpO2: 97%   Weight: 112 lb (50.8 kg)     Wt Readings from Last 3 Encounters:   08/07/18 112 lb (50.8 kg)   08/05/18 108 lb (49 kg)   08/03/18 109 lb (49.4 kg)     PHQ score: PHQ-9 Total Score: 1 (11/6/2017 10:44 AM)    GENERAL:Alert and oriented x 4 NAD, slender, well hydrated, well developed. Hoarse voice  NECK:supple and non tender without mass, no thyromegaly or thyroid nodules, no cervical lymphadenopathy  LUNG:clear to auscultation bilaterally with normal respiratory effort  CV: Normal heart sounds, regular rate and rhythm without murmurs  EXTREMETY: no loss of hair, no edema, normal pedal pulses bilaterally, wearing support stockings          Assessment:      Carolina Ibanez was seen today for ed follow-up.     Diagnoses and all orders for this visit:    Aspiration pneumonia of both lower lobes, unspecified aspiration

## 2018-08-17 NOTE — PROGRESS NOTES
Hypothyroidism     Major depressive disorder     Oropharyngeal dysphagia     Orthostatic hypotension     Osteopenia     Other disorders of kidney and ureter in diseases classified elsewhere     Pacemaker     Paroxysmal atrial fibrillation (HCC)     Partial epilepsy, with intractable epilepsy, pharmacoresistant ([address]     Personal history of malignant neoplasm of cervix uteri 6/6/2013    Personal history of PE (pulmonary embolism)     post surgical    Pneumonia     Post traumatic seizure ([address]     Seizures ([address]     Sinus node dysfunction ([address]     Spinal stenosis of lumbar region         Past Surgical History:   Procedure Laterality Date    COLONOSCOPY  04/2013    normal except diverticulosis    PACEMAKER PLACEMENT Left     MRI compatible    PACEMAKER PLACEMENT Left     ROTATOR CUFF REPAIR Right     ROLAND AND BSO  9/2010    cervical cancer, Dr. Anum Denny ENDOSCOPY  02/2016    normal       Allergies   Allergen Reactions    Cymbalta [Duloxetine Hcl] Other (See Comments)     Sleepy dizzy    Sulfa Antibiotics      Doesn't remember       Medication:   Current Outpatient Prescriptions   Medication Sig Dispense Refill    sertraline (ZOLOFT) 100 MG tablet Take 1 tablet by mouth daily 30 tablet 6    nitroGLYCERIN (NITROSTAT) 0.4 MG SL tablet up to max of 3 total doses.  If no relief after 1 dose, call 911. 25 tablet 3    levothyroxine (SYNTHROID) 50 MCG tablet TAKE 1 TABLET BY MOUTH DAILY 90 tablet 3    flecainide (TAMBOCOR) 50 MG tablet TAKE 1 TABLET BY MOUTH EVERY 12 HOURS 60 tablet 2    SPIRIVA HANDIHALER 18 MCG inhalation capsule INHALE 1 CAPSULE VIA HANDIHALER ONCE DAILY AT THE SAME TIME EVERY DAY 30 capsule 5    ferrous sulfate 325 (65 Fe) MG tablet Take 1 tablet by mouth daily (with breakfast) 30 tablet 3    levalbuterol (XOPENEX HFA) 45 MCG/ACT inhaler Inhale 1 puff into the lungs every 4 hours as needed for Wheezing 1 Inhaler 3    XARELTO 15 MG · Mouth/Throat: Oropharynx is clear and moist.   · Eyes: Conjunctivae normal. EOM are normal.   · Neck: Normal range of motion. Neck supple. No rigidity. No JVD present. · Cardiovascular: Normal rate, regular rhythm, S1&S2 and intact distal pulses   · Incision has healed well. · Pulmonary/Chest: Bilateral respiratory sounds. No wheezes. No rhonchi. · Abdominal: Soft. Bowel sounds present. No distension, No tenderness. · Musculoskeletal: No tenderness. No edema    · Lymphadenopathy: Has no cervical adenopathy. · Neurological: Alert and oriented. No Gross deficit   · Skin: Skin is warm and dry. No rash noted. · Psychiatric: Has a normal mood, affect and behavior      Labs:  Reviewed. ECHO: 8/24/17   Summary   -Global ejection fraction is increased (hyperdynamic) and estimated from 70   % to 75 %.  -No regional wall motion abnormalities are noted.   -Thickened mitral valve without evidence of stenosis. Trace mitral   regurgitation.   -There is trivial tricuspid regurgitation with RVSP estimated at 33 mmHg.   -Pacemaker wire noted in right heart.   -E/e'= 14.3 . Echo: 2/2/16  Summary   -Normal left ventricle size, wall thickness and systolic function with an   estimated ejection fraction of 55%.   -Trivial mitral regurgitation is present.   -There is mild tricuspid regurgitation with RVSP estimated at 33 mmHg.     Cath:     10/25/17 Coronary CTA  No significant coronary artery stenosis is identified.       Normal variation in the supply the anterolateral wall.  Although only 1 acute   diagonal is identified, there are 2 proximal obtuse marginal vessels.       Right coronary dominance.         STRESS TEST:   10/24/17  Summary    Normal myocardial perfusion study.    Normal LV size and systolic function.      5/22/15 CTPA  1. No findings of pulmonary embolism. 2. Patchy left upper lobe bronchial secretions with minimal bilateral central   bronchial wall thickening, suggesting bronchitis.

## 2018-08-21 ENCOUNTER — PROCEDURE VISIT (OUTPATIENT)
Dept: CARDIOLOGY CLINIC | Age: 83
End: 2018-08-21

## 2018-08-21 ENCOUNTER — OFFICE VISIT (OUTPATIENT)
Dept: CARDIOLOGY CLINIC | Age: 83
End: 2018-08-21

## 2018-08-21 VITALS
HEIGHT: 61 IN | WEIGHT: 109 LBS | SYSTOLIC BLOOD PRESSURE: 96 MMHG | HEART RATE: 82 BPM | BODY MASS INDEX: 20.58 KG/M2 | DIASTOLIC BLOOD PRESSURE: 62 MMHG

## 2018-08-21 DIAGNOSIS — R42 DIZZINESS: ICD-10-CM

## 2018-08-21 DIAGNOSIS — R56.9 SEIZURE-LIKE ACTIVITY (HCC): Primary | ICD-10-CM

## 2018-08-21 DIAGNOSIS — I48.0 PAROXYSMAL ATRIAL FIBRILLATION (HCC): ICD-10-CM

## 2018-08-21 DIAGNOSIS — R00.1 BRADYCARDIA: ICD-10-CM

## 2018-08-21 DIAGNOSIS — Z95.0 PACEMAKER: ICD-10-CM

## 2018-08-21 PROCEDURE — 99214 OFFICE O/P EST MOD 30 MIN: CPT | Performed by: INTERNAL MEDICINE

## 2018-08-21 PROCEDURE — 93280 PM DEVICE PROGR EVAL DUAL: CPT | Performed by: INTERNAL MEDICINE

## 2018-08-21 RX ORDER — MIDODRINE HYDROCHLORIDE 5 MG/1
5 TABLET ORAL 3 TIMES DAILY
Qty: 90 TABLET | Refills: 5 | Status: SHIPPED | OUTPATIENT
Start: 2018-08-21 | End: 2018-11-02 | Stop reason: SDUPTHER

## 2018-09-07 ENCOUNTER — OFFICE VISIT (OUTPATIENT)
Dept: FAMILY MEDICINE CLINIC | Age: 83
End: 2018-09-07

## 2018-09-07 VITALS
OXYGEN SATURATION: 98 % | WEIGHT: 108 LBS | DIASTOLIC BLOOD PRESSURE: 80 MMHG | TEMPERATURE: 97.8 F | HEART RATE: 70 BPM | BODY MASS INDEX: 20.41 KG/M2 | SYSTOLIC BLOOD PRESSURE: 142 MMHG

## 2018-09-07 DIAGNOSIS — J30.1 SEASONAL ALLERGIC RHINITIS DUE TO POLLEN: Primary | ICD-10-CM

## 2018-09-07 DIAGNOSIS — R05.9 COUGH: ICD-10-CM

## 2018-09-07 DIAGNOSIS — Z23 NEED FOR VACCINATION: ICD-10-CM

## 2018-09-07 DIAGNOSIS — R06.02 SOB (SHORTNESS OF BREATH): ICD-10-CM

## 2018-09-07 PROCEDURE — G0008 ADMIN INFLUENZA VIRUS VAC: HCPCS | Performed by: FAMILY MEDICINE

## 2018-09-07 PROCEDURE — 99213 OFFICE O/P EST LOW 20 MIN: CPT | Performed by: FAMILY MEDICINE

## 2018-09-07 PROCEDURE — 90662 IIV NO PRSV INCREASED AG IM: CPT | Performed by: FAMILY MEDICINE

## 2018-09-07 RX ORDER — MEGESTROL ACETATE 40 MG/ML
200 SUSPENSION ORAL DAILY
Qty: 240 ML | Refills: 6 | Status: SHIPPED | OUTPATIENT
Start: 2018-09-07 | End: 2019-01-01

## 2018-09-07 RX ORDER — FLECAINIDE ACETATE 50 MG/1
50 TABLET ORAL EVERY 12 HOURS SCHEDULED
Qty: 60 TABLET | Refills: 12 | Status: SHIPPED | OUTPATIENT
Start: 2018-09-07 | End: 2019-01-01 | Stop reason: SDUPTHER

## 2018-09-07 RX ORDER — SERTRALINE HYDROCHLORIDE 100 MG/1
100 TABLET, FILM COATED ORAL DAILY
Qty: 30 TABLET | Refills: 12 | Status: SHIPPED | OUTPATIENT
Start: 2018-09-07 | End: 2019-01-01

## 2018-09-07 ASSESSMENT — PATIENT HEALTH QUESTIONNAIRE - PHQ9
SUM OF ALL RESPONSES TO PHQ9 QUESTIONS 1 & 2: 4
1. LITTLE INTEREST OR PLEASURE IN DOING THINGS: 2
SUM OF ALL RESPONSES TO PHQ QUESTIONS 1-9: 4
SUM OF ALL RESPONSES TO PHQ QUESTIONS 1-9: 4
2. FEELING DOWN, DEPRESSED OR HOPELESS: 2

## 2018-09-07 NOTE — PROGRESS NOTES
Vaccine Information Sheet, \"Influenza - Inactivated\"  given to Shreon Ortiz, or parent/legal guardian of  [place] and verbalized understanding. Patient responses:    Have you ever had a reaction to a flu vaccine? No  Are you able to eat eggs without adverse effects? Yes  Do you have any current illness? No  Have you ever had [place] Syndrome? No    Flu vaccine given per order. Please see immunization tab.

## 2018-09-07 NOTE — PROGRESS NOTES
Subjective:      Patient ID: Naomi Davila is a 80 y.o. female. HPI patient presents today for cough, congestion, feels like she cannot cough up anything. Sx's started months ago when she had pneumonia and sx's have not gone away. Here with persistant cough. Started after had pneumonia early August. Non productive cough but always feels like something is there. Feels rattling in AM and when lies down at night. Feels SOB more than before had pneumonia. Feels like getting worse. States getting ready in AM gets sob dressing, putting on make up, etc. Has to stop and rest. Feels like drainage in throat. Not really RN. No sneezing. No itchy watery eyes. No ST. Ears ok. Using xopenex about 4 times a week and helps with sob. Weight and appetite started to drop when stopped megace so restarted 1 tsp daily and eating better and weight has improved. Stable here. midodrin increased and bp sx doing better. Still not doing a lot, still not wanting to do things. Review of Systems    Objective:   Physical Exam  Vitals:    09/07/18 1106   BP: (!) 142/80   Site: Left Upper Arm   Position: Sitting   Cuff Size: Small Adult   Pulse: 70   Temp: 97.8 °F (36.6 °C)   TempSrc: Tympanic   SpO2: 98%   Weight: 108 lb (49 kg)     Wt Readings from Last 3 Encounters:   09/07/18 108 lb (49 kg)   08/21/18 109 lb (49.4 kg)   08/07/18 112 lb (50.8 kg)     Body mass index is 20.41 kg/m². Alert and oriented x 4 NAD, slender, well hydrated, well developed. TM clear bilaterally, nl pinna  Nares pale and boggy with clear rhinorhea  OP clear  Neck no nodes  Lung clear  CV rrr      Assessment:      Kevon Li was seen today for cough. Diagnoses and all orders for this visit:    Seasonal allergic rhinitis due to pollen  Nose looks allergic, ?  Drainage causing cough, lungs sound ok today  Trial claritin or zyrtec  Has appt with pulm in few weeks, can reeval sx then    Cough  As above  Encouraged use xopenex as needed    SOB

## 2018-09-15 ENCOUNTER — HOSPITAL ENCOUNTER (OUTPATIENT)
Dept: OTHER | Age: 83
Discharge: HOME | End: 2018-09-15
Attending: FAMILY MEDICINE | Admitting: FAMILY MEDICINE

## 2018-09-15 ENCOUNTER — OFFICE VISIT (OUTPATIENT)
Dept: FAMILY MEDICINE CLINIC | Age: 83
End: 2018-09-15

## 2018-09-15 VITALS
WEIGHT: 109 LBS | SYSTOLIC BLOOD PRESSURE: 98 MMHG | HEART RATE: 95 BPM | OXYGEN SATURATION: 93 % | BODY MASS INDEX: 20.6 KG/M2 | DIASTOLIC BLOOD PRESSURE: 62 MMHG

## 2018-09-15 DIAGNOSIS — M54.41 ACUTE RIGHT-SIDED LOW BACK PAIN WITH RIGHT-SIDED SCIATICA: ICD-10-CM

## 2018-09-15 DIAGNOSIS — R10.30 LOWER ABDOMINAL PAIN: ICD-10-CM

## 2018-09-15 DIAGNOSIS — M54.41 ACUTE RIGHT-SIDED LOW BACK PAIN WITH RIGHT-SIDED SCIATICA: Primary | ICD-10-CM

## 2018-09-15 LAB
BACTERIA URINE, POC: 0
BILIRUBIN URINE: 0 MG/DL
BLOOD, URINE: POSITIVE
CASTS URINE, POC: 0
CLARITY: CLEAR
COLOR: NORMAL
CRYSTALS URINE, POC: 0
EPI CELLS URINE, POC: NORMAL
GLUCOSE URINE: NORMAL
KETONES, URINE: NEGATIVE
LEUKOCYTE EST, POC: NEGATIVE
NITRITE, URINE: NEGATIVE
PH UA: 5.5 (ref 4.5–8)
PROTEIN UA: NEGATIVE
RBC URINE, POC: 0
SPECIFIC GRAVITY UA: 1.01 (ref 1–1.03)
UROBILINOGEN, URINE: NORMAL
WBC URINE, POC: 0
YEAST URINE, POC: 0

## 2018-09-15 PROCEDURE — 96372 THER/PROPH/DIAG INJ SC/IM: CPT | Performed by: FAMILY MEDICINE

## 2018-09-15 PROCEDURE — 99214 OFFICE O/P EST MOD 30 MIN: CPT | Performed by: FAMILY MEDICINE

## 2018-09-15 PROCEDURE — 81000 URINALYSIS NONAUTO W/SCOPE: CPT | Performed by: FAMILY MEDICINE

## 2018-09-15 RX ORDER — CYCLOBENZAPRINE HCL 5 MG
5 TABLET ORAL DAILY PRN
Qty: 20 TABLET | Refills: 0 | Status: SHIPPED | OUTPATIENT
Start: 2018-09-15 | End: 2018-09-25

## 2018-09-15 RX ORDER — KETOROLAC TROMETHAMINE 30 MG/ML
30 INJECTION, SOLUTION INTRAMUSCULAR; INTRAVENOUS ONCE
Status: COMPLETED | OUTPATIENT
Start: 2018-09-15 | End: 2018-09-15

## 2018-09-15 RX ORDER — PREDNISONE 20 MG/1
TABLET ORAL
Qty: 10 TABLET | Refills: 0 | Status: SHIPPED | OUTPATIENT
Start: 2018-09-15 | End: 2018-10-02 | Stop reason: CLARIF

## 2018-09-15 RX ORDER — TRAMADOL HYDROCHLORIDE 50 MG/1
50 TABLET ORAL EVERY 8 HOURS PRN
Qty: 20 TABLET | Refills: 0 | Status: SHIPPED | OUTPATIENT
Start: 2018-09-15 | End: 2018-09-18

## 2018-09-15 RX ORDER — METHYLPREDNISOLONE ACETATE 80 MG/ML
80 INJECTION, SUSPENSION INTRA-ARTICULAR; INTRALESIONAL; INTRAMUSCULAR; SOFT TISSUE ONCE
Status: COMPLETED | OUTPATIENT
Start: 2018-09-15 | End: 2018-09-15

## 2018-09-15 RX ADMIN — METHYLPREDNISOLONE ACETATE 80 MG: 80 INJECTION, SUSPENSION INTRA-ARTICULAR; INTRALESIONAL; INTRAMUSCULAR; SOFT TISSUE at 11:21

## 2018-09-15 RX ADMIN — KETOROLAC TROMETHAMINE 30 MG: 30 INJECTION, SOLUTION INTRAMUSCULAR; INTRAVENOUS at 11:20

## 2018-09-15 ASSESSMENT — ENCOUNTER SYMPTOMS
VOMITING: 0
CONSTIPATION: 0
NAUSEA: 0
ABDOMINAL PAIN: 1
BACK PAIN: 1
DIARRHEA: 0

## 2018-09-15 NOTE — PATIENT INSTRUCTIONS
Start prednisone tomorrow. Take with food as this can upset stomach and take in morning. Flexeril if needed for muscle spasm. This can cause fatigue, dizziness so take with caution. The ultram is a pain medication for use if pain is severe and your tylenol is not helping. Use sparingly as this can also cause drowsiness. If worsening of symptoms let me know otherwise I will check in with you on Tuesday to see how pain is. I will let you know about xray at that time as well unless there is an abnormality that needs to be addressed sooner.

## 2018-09-15 NOTE — PROGRESS NOTES
Deonte Alicia  : 1931  Encounter date: 9/15/2018    This is a 80 y.o. female who presents with  Chief Complaint   Patient presents with    Lower Back Pain     Patient presents with lower back pain that runs down her right leg. Patient also complains of lower abdomen pain. Patient states pain started this past Tuesday. Patient has been taking Tylenol for the pain. History of present illness:    Since Tuesday has been hurting right lower back all the way down leg. Groin both sides hurt. Feels swollen in belly. Constant pain. Seemed to come out of blue. Does feel that right leg is weak; hurts to lift leg; difficult to lift even to go up steps. Has spinal stenosis, degen disc, and sciatica issues in past. Pain is 6-7/10. Last imaging 2017. Laying on heating pad which helps some. Allergies   Allergen Reactions    Cymbalta [Duloxetine Hcl] Other (See Comments)     Sleepy dizzy    Sulfa Antibiotics      Doesn't remember     Outpatient Prescriptions Marked as Taking for the 9/15/18 encounter (Office Visit) with Kiarra Moran MD   Medication Sig Dispense Refill    predniSONE (DELTASONE) 20 MG tablet Take 2 tabs PO daily x 5 days 10 tablet 0    cyclobenzaprine (FLEXERIL) 5 MG tablet Take 1 tablet by mouth daily as needed for Muscle spasms 20 tablet 0    traMADol (ULTRAM) 50 MG tablet Take 1 tablet by mouth every 8 hours as needed for Pain (severe pain) for up to 3 days. Take lowest dose possible to manage pain.  20 tablet 0    flecainide (TAMBOCOR) 50 MG tablet Take 1 tablet by mouth every 12 hours 60 tablet 12    sertraline (ZOLOFT) 100 MG tablet Take 1 tablet by mouth daily 30 tablet 12    megestrol (MEGACE) 40 MG/ML suspension Take 5 mLs by mouth daily 240 mL 6    tiotropium (SPIRIVA RESPIMAT) 2.5 MCG/ACT AERS inhaler Inhale 2 puffs into the lungs daily 1 Inhaler 12    midodrine (PROAMATINE) 5 MG tablet Take 1 tablet by mouth 3 times daily 90 tablet 5    nitroGLYCERIN

## 2018-09-17 LAB — URINE CULTURE, ROUTINE: NORMAL

## 2018-10-02 ENCOUNTER — OFFICE VISIT (OUTPATIENT)
Dept: NEUROLOGY | Age: 83
End: 2018-10-02
Payer: MEDICARE

## 2018-10-02 VITALS
DIASTOLIC BLOOD PRESSURE: 86 MMHG | WEIGHT: 108 LBS | BODY MASS INDEX: 20.39 KG/M2 | HEIGHT: 61 IN | SYSTOLIC BLOOD PRESSURE: 139 MMHG | HEART RATE: 79 BPM

## 2018-10-02 DIAGNOSIS — G40.209 PARTIAL SYMPTOMATIC EPILEPSY WITH COMPLEX PARTIAL SEIZURES, NOT INTRACTABLE, WITHOUT STATUS EPILEPTICUS (HCC): Primary | ICD-10-CM

## 2018-10-02 DIAGNOSIS — G47.33 OBSTRUCTIVE SLEEP APNEA: ICD-10-CM

## 2018-10-02 DIAGNOSIS — R53.83 OTHER FATIGUE: ICD-10-CM

## 2018-10-02 DIAGNOSIS — F32.A DEPRESSION, UNSPECIFIED DEPRESSION TYPE: ICD-10-CM

## 2018-10-02 PROCEDURE — 99214 OFFICE O/P EST MOD 30 MIN: CPT | Performed by: PSYCHIATRY & NEUROLOGY

## 2018-10-02 NOTE — PROGRESS NOTES
elsewhere     Pacemaker     [facility]     Partial epilepsy, with intractable epilepsy, pharmacoresistant ([facility]     Personal history of malignant neoplasm of cervix uteri 6/6/2013    Personal history of PE (pulmonary embolism)     post surgical    Pneumonia     Post traumatic seizure ([facility]     Seizures ([facility]     Sinus node dysfunction ([facility]     Spinal stenosis of lumbar region      Family History   Problem Relation Age of Onset    Stroke Mother     Parkinsonism Mother     High Blood Pressure Mother     Cancer Daughter         breast    Other Father         black lung    Lung Cancer Brother     Breast Cancer Maternal Aunt     Heart Disease Neg Hx     High Cholesterol Neg Hx      Social History     Social History    Marital status:      Spouse name: N/A    Number of children: 3    Years of education: N/A     Occupational History    retired clerical job      Social History Main Topics    Smoking status: Never Smoker    Smokeless tobacco: Never Used    Alcohol use No    Drug use: No    Sexual activity: Not Currently     Other Topics Concern    None     Social History Narrative    None        Objective:  Exam:  /86   Pulse 79   Ht 5' 1\" (1.549 m)   Wt 108 lb (49 kg)   BMI 20.41 kg/m²   This is a well-nourished patient in no acute distress  Patient is awake, alert and oriented x3. Speech is normal.  Pupils are equal round reacting to light. Extraocular movements intact. Face symmetrical. Tongue midline. Motor exam shows normal symmetrical strength. Deep tendon reflexes normal. Plantar reflexes downgoing. Sensory exam normal. Coordination normal. Gait normal. No carotid bruit. No neck stiffness.     Data :  LABS:  General Labs:    CBC:   Lab Results   Component Value Date    WBC 11.4 08/05/2018    RBC 3.63 08/05/2018    HGB 12.0 08/05/2018    HCT 35.3 08/05/2018    MCV 97.2 08/05/2018    MCH 33.2 08/05/2018    MCHC 34.1 08/05/2018    RDW 12.8 08/05/2018     08/05/2018    MPV 7.1 08/05/2018     BMP:    Lab Results   Component Value Date     08/05/2018    K 3.8 08/05/2018    K 4.3 07/01/2018     08/05/2018    CO2 19 08/05/2018    BUN 26 08/05/2018    LABALBU 4.3 08/05/2018    CREATININE 0.9 08/05/2018    CALCIUM 9.8 08/05/2018    GFRAA >60 08/05/2018    GFRAA >60 06/13/2013    LABGLOM 59 08/05/2018    GLUCOSE 88 08/05/2018     TSH:    Lab Results   Component Value Date    TSH 2.58 12/19/2017       Impression :  Partial simple seizures stable  History of previous head trauma  Patient's symptoms improved and resolved  Headache resolved  Repeat sed rate and CRP are now normal  Repeat Keppra level was therapeutic  Patient has had a previous history of B12 deficiency. Depression, improved  I'm concerned the patient has obstructive sleep apnea given his excessive fatigue    Plan :  Discussed with patient and her daughter  Continue Keppra 750 mg twice daily  Continue current dose of Keppra  I will get a sleep study done to look for obstructive sleep apnea        Please note a portion of  this chart was generated using dragon dictation software. Although every effort was made to ensure the accuracy of this automated transcription, some errors in transcription may have occurred.

## 2018-10-09 ENCOUNTER — OFFICE VISIT (OUTPATIENT)
Dept: FAMILY MEDICINE CLINIC | Age: 83
End: 2018-10-09
Payer: MEDICARE

## 2018-10-09 VITALS
SYSTOLIC BLOOD PRESSURE: 148 MMHG | HEART RATE: 100 BPM | OXYGEN SATURATION: 95 % | WEIGHT: 110 LBS | DIASTOLIC BLOOD PRESSURE: 82 MMHG | BODY MASS INDEX: 20.78 KG/M2

## 2018-10-09 DIAGNOSIS — M48.062 SPINAL STENOSIS OF LUMBAR REGION WITH NEUROGENIC CLAUDICATION: Primary | ICD-10-CM

## 2018-10-09 PROCEDURE — 96372 THER/PROPH/DIAG INJ SC/IM: CPT | Performed by: FAMILY MEDICINE

## 2018-10-09 PROCEDURE — 99213 OFFICE O/P EST LOW 20 MIN: CPT | Performed by: FAMILY MEDICINE

## 2018-10-09 RX ORDER — METHYLPREDNISOLONE ACETATE 80 MG/ML
80 INJECTION, SUSPENSION INTRA-ARTICULAR; INTRALESIONAL; INTRAMUSCULAR; SOFT TISSUE ONCE
Status: COMPLETED | OUTPATIENT
Start: 2018-10-09 | End: 2018-10-09

## 2018-10-09 RX ORDER — KETOROLAC TROMETHAMINE 30 MG/ML
15 INJECTION, SOLUTION INTRAMUSCULAR; INTRAVENOUS ONCE
Status: COMPLETED | OUTPATIENT
Start: 2018-10-09 | End: 2018-10-09

## 2018-10-09 RX ORDER — KETOROLAC TROMETHAMINE 15 MG/ML
15 INJECTION, SOLUTION INTRAMUSCULAR; INTRAVENOUS ONCE
Status: DISCONTINUED | OUTPATIENT
Start: 2018-10-09 | End: 2018-10-09

## 2018-10-09 RX ADMIN — KETOROLAC TROMETHAMINE 15 MG: 30 INJECTION, SOLUTION INTRAMUSCULAR; INTRAVENOUS at 17:13

## 2018-10-09 RX ADMIN — METHYLPREDNISOLONE ACETATE 80 MG: 80 INJECTION, SUSPENSION INTRA-ARTICULAR; INTRALESIONAL; INTRAMUSCULAR; SOFT TISSUE at 17:13

## 2018-10-09 ASSESSMENT — PATIENT HEALTH QUESTIONNAIRE - PHQ9
SUM OF ALL RESPONSES TO PHQ QUESTIONS 1-9: 0
SUM OF ALL RESPONSES TO PHQ QUESTIONS 1-9: 0
1. LITTLE INTEREST OR PLEASURE IN DOING THINGS: 0
2. FEELING DOWN, DEPRESSED OR HOPELESS: 0
SUM OF ALL RESPONSES TO PHQ9 QUESTIONS 1 & 2: 0

## 2018-10-10 ENCOUNTER — TELEPHONE (OUTPATIENT)
Dept: FAMILY MEDICINE CLINIC | Age: 83
End: 2018-10-10

## 2018-10-11 ENCOUNTER — OFFICE VISIT (OUTPATIENT)
Dept: PULMONOLOGY | Age: 83
End: 2018-10-11
Payer: MEDICARE

## 2018-10-11 VITALS
OXYGEN SATURATION: 99 % | SYSTOLIC BLOOD PRESSURE: 139 MMHG | HEART RATE: 83 BPM | RESPIRATION RATE: 18 BRPM | BODY MASS INDEX: 20.96 KG/M2 | DIASTOLIC BLOOD PRESSURE: 83 MMHG | WEIGHT: 111 LBS | HEIGHT: 61 IN

## 2018-10-11 DIAGNOSIS — J44.9 COPD, MILD (HCC): Primary | ICD-10-CM

## 2018-10-11 DIAGNOSIS — T17.908D ASPIRATION INTO AIRWAY, SUBSEQUENT ENCOUNTER: ICD-10-CM

## 2018-10-11 PROBLEM — T17.908A ASPIRATION INTO AIRWAY: Status: ACTIVE | Noted: 2018-10-11

## 2018-10-11 PROCEDURE — 99213 OFFICE O/P EST LOW 20 MIN: CPT | Performed by: INTERNAL MEDICINE

## 2018-10-11 RX ORDER — LEVALBUTEROL INHALATION SOLUTION 0.63 MG/3ML
0.63 SOLUTION RESPIRATORY (INHALATION) EVERY 4 HOURS PRN
Qty: 360 ML | Refills: 5 | Status: SHIPPED | OUTPATIENT
Start: 2018-10-11

## 2018-10-11 NOTE — PROGRESS NOTES
HPI: Follow up visit, COPD  She has been doing fairly well since last visit with me although she was admitted to the hospital a few times with persistent concern about aspiration  She denies any worsening shortness of breath or dyspnea on exertion  She denies any daily productive cough, fever, chills, diaphoresis or hemoptysis  Her last PFT was done in 2016 and showed mild COPD  She has been on Spiriva daily and uses Xopenex HFA as needed    Her CT chest in January 2018 was read as:  EXAMINATION:   CTA OF THE CHEST       1/13/2018 10:50 pm       TECHNIQUE:   CTA of the chest was performed after the administration of intravenous   contrast.  Multiplanar reformatted images are provided for review.  MIP   images are provided for review. Dose modulation, iterative reconstruction,   and/or weight based adjustment of the mA/kV was utilized to reduce the   radiation dose to as low as reasonably achievable.       COMPARISON:   Chest CT 11/06/2017, chest CT 05/31/2016       HISTORY:   ORDERING PHYSICIAN PROVIDED HISTORY: CHEST PAIN, ACUTE, PULMONARY EMBOLISM   SUSPECTED   TECHNOLOGIST PROVIDED HISTORY:   If patient is on cardiac monitor and/or pulse ox, they may be taken off   cardiac monitor and pulse ox, left on O2 if currently on. All monitors   reattached when patient returns to room. Technologist Provided Reason for Exam: pt brought to ed by [facility] for   chest pain, patient was out to dinner and felt four sharp stabbing pain under   left breast, hx afib and pacemaker denies pain at this time and nausea but is   slightly short of breath   Acuity: Acute   Type of Encounter: Initial   Relevant Medical/Surgical History: COPD       FINDINGS:   PULMONARY ARTERIES: Normal caliber.  Adequately opacified for evaluation.  No   filling defects consistent with emboli.       MEDIASTINUM: Left subclavian dual chamber pacemaker with intact leads.    Normal heart size.  No bowing of the interventricular septum.  No pericardial Hypothyroidism     Major depressive disorder     Oropharyngeal dysphagia     Orthostatic hypotension     Osteopenia     Other disorders of kidney and ureter in diseases classified elsewhere     Pacemaker     Paroxysmal atrial fibrillation (HCC)     Partial epilepsy, with intractable epilepsy, [facility]oresistant [facility]     Personal history of malignant neoplasm of cervix uteri 6/6/2013    Personal history of PE (pulmonary embolism)     post surgical    Pneumonia     Post traumatic seizure ([facility]     Seizures ([facility]     Sinus node dysfunction ([facility]     Spinal stenosis of lumbar region      Current Outpatient Prescriptions   Medication Sig Dispense Refill    levalbuterol (XOPENEX) 0.63 MG/3ML nebulization Take 3 mLs by nebulization every 4 hours as needed for Wheezing 360 mL 5    flecainide (TAMBOCOR) 50 MG tablet Take 1 tablet by mouth every 12 hours 60 tablet 12    sertraline (ZOLOFT) 100 MG tablet Take 1 tablet by mouth daily 30 tablet 12    megestrol (MEGACE) 40 MG/ML suspension Take 5 mLs by mouth daily 240 mL 6    tiotropium (SPIRIVA RESPIMAT) 2.5 MCG/ACT AERS inhaler Inhale 2 puffs into the lungs daily 1 Inhaler 12    midodrine (PROAMATINE) 5 MG tablet Take 1 tablet by mouth 3 times daily 90 tablet 5    nitroGLYCERIN (NITROSTAT) 0.4 MG SL tablet up to max of 3 total doses.  If no relief after 1 dose, call 911. 25 tablet 3    levothyroxine (SYNTHROID) 50 MCG tablet TAKE 1 TABLET BY MOUTH DAILY 90 tablet 3    ferrous sulfate 325 (65 Fe) MG tablet Take 1 tablet by mouth daily (with breakfast) 30 tablet 3    levalbuterol (XOPENEX HFA) 45 MCG/ACT inhaler Inhale 1 puff into the lungs every 4 hours as needed for Wheezing 1 Inhaler 3    XARELTO 15 MG TABS tablet TAKE 1 TABLET BY MOUTH EVERY DAY WITH LARGE MEAL OF THE DAY 30 tablet 11    Cranberry 1000 MG CAPS Take by mouth      levETIRAcetam (KEPPRA) 750 MG tablet Take 1 tablet by mouth 2 times daily 360 tablet 3    vitamin B-12 (CYANOCOBALAMIN) Eyes: Conjunctivae and extraocular motions are normal. Pupils are equal, round, and reactive to light. Right eye exhibits no discharge. Left eye exhibits no discharge. Neck: Normal range of motion. Neck supple. No JVD present. Carotid bruit is not present. No rigidity. No tracheal deviation present. No thyromegaly present. Cardiovascular: Normal rate, regular rhythm, S1&S2 and intact distal pulses. Pulmonary/Chest: Effort normal and breath sounds normal. No stridor. No respiratory distress. No wheezes, rhonchi, rales or tenderness. Abdominal: Soft. Bowel sounds are normal, no shifting dullness, no distension and no mass. No tenderness. No rebound and no guarding. Musculoskeletal: Normal range of motion. No edema and no tenderness. Lymphadenopathy:    No cervical adenopathy. No axillary adenopathy. Neurological: Alert and oriented. Normal reflexes. No cranial nerve deficit. Normal muscle tone. Coordination normal.   Skin: Skin is warm and dry. No rash noted. No erythema. Psychiatric: Normal mood and affect. Behavior is normal. Thought content normal.        Assessment:     Diagnosis Orders   1. COPD, mild ([address]     2. Aspiration into airway, subsequent encounter             Plan:  1. She continues to have episodes of choking and difficulty swallowing  2. I will refer her back to speech therapy as an outpatient  3. Continue to be symptomatic despite the use of Xopenex HFA, we will order nebulizer treatment Xopenex 3-4 times daily  4. Continue Spiriva daily   5.  RTC in 6 months, sooner if needed

## 2018-10-11 NOTE — LETTER
[facility] Pulmonary, [address] Sleep  [address] [facility] [address] [place] [address]  [address] [id number]  Phone: [phone]  Fax: [phone]    Yessy Cm MD        October 11, 2018     Ruthann Ziegler, [address] [facility] [address]    Patient: Dominic Ritter  MR Number: [id number]  YOB: 1931  Date of Visit: 10/11/2018    Dear Dr. Ruthann Ziegler:    I saw Mrs. Dominic Ritetr today for follow-up visit. Below are the relevant portions of my assessment and plan of care. Assessment:     Diagnosis Orders   1. COPD, mild ([address]     2. Aspiration into airway, subsequent encounter       Plan:     1. She continues to have episodes of choking and difficulty swallowing  2. I will refer her back to speech therapy as an outpatient  3. Continue to be symptomatic despite the use of Xopenex HFA, we will order nebulizer treatment Xopenex 3-4 times daily  4. Continue Spiriva daily   5. RTC in 6 months, sooner if needed      If you have questions, please do not hesitate to call me. I look forward to following Salina Cortez along with you.     Sincerely,        Yessy Cm MD

## 2018-11-02 ENCOUNTER — OFFICE VISIT (OUTPATIENT)
Dept: FAMILY MEDICINE CLINIC | Age: 83
End: 2018-11-02
Payer: MEDICARE

## 2018-11-02 ENCOUNTER — HOSPITAL ENCOUNTER (OUTPATIENT)
Age: 83
Discharge: HOME | End: 2018-11-02
Payer: MEDICARE

## 2018-11-02 VITALS
DIASTOLIC BLOOD PRESSURE: 74 MMHG | BODY MASS INDEX: 21.16 KG/M2 | OXYGEN SATURATION: 94 % | SYSTOLIC BLOOD PRESSURE: 138 MMHG | WEIGHT: 112 LBS | HEART RATE: 79 BPM

## 2018-11-02 DIAGNOSIS — M48.062 SPINAL STENOSIS OF LUMBAR REGION WITH NEUROGENIC CLAUDICATION: ICD-10-CM

## 2018-11-02 DIAGNOSIS — I95.1 ORTHOSTATIC HYPOTENSION: Primary | ICD-10-CM

## 2018-11-02 DIAGNOSIS — R41.3 MEMORY LOSS: ICD-10-CM

## 2018-11-02 DIAGNOSIS — H53.2 DIPLOPIA: ICD-10-CM

## 2018-11-02 DIAGNOSIS — R63.0 ANOREXIA: ICD-10-CM

## 2018-11-02 LAB
FOLATE: >20 NG/ML (ref 4.78–24.2)
TSH REFLEX: 3.2 UIU/ML (ref 0.27–4.2)
VITAMIN B-12: 1028 PG/ML (ref 211–911)

## 2018-11-02 PROCEDURE — 84443 ASSAY THYROID STIM HORMONE: CPT

## 2018-11-02 PROCEDURE — 36415 COLL VENOUS BLD VENIPUNCTURE: CPT

## 2018-11-02 PROCEDURE — 82607 VITAMIN B-12: CPT

## 2018-11-02 PROCEDURE — 82746 ASSAY OF FOLIC ACID SERUM: CPT

## 2018-11-02 PROCEDURE — 99214 OFFICE O/P EST MOD 30 MIN: CPT | Performed by: FAMILY MEDICINE

## 2018-11-02 RX ORDER — MIDODRINE HYDROCHLORIDE 5 MG/1
TABLET ORAL
Qty: 90 TABLET | Refills: 5 | Status: ON HOLD
Start: 2018-11-02 | End: 2018-12-17 | Stop reason: HOSPADM

## 2018-11-02 NOTE — PROGRESS NOTES
Subjective:      Patient ID: Lacho Gonzalez is a 80 y.o. female. HPI patient presents today for her 2 month follow up on depression. States in the morning the BP is lower, lunch time creeping up higher and in the evening BP is very high. Taking the midodrine only twice a day if BP is high in the evening. Here for f/u BP, depression. States BP has been climbing later in the day, has been skipping third dose of midodrine if high and helps. Still low in AM.    Still taking megace, helping with appetite and weight. Daughter and pt report appetite has been very good. Still down, not much interest in doing things, frustrated can't get out more but daughter reports even when they try to get her out often doesn't want to go. Usually once goes glad she did but not always. Still gets lots of pleasure out of seeing her great grandbabies and always looks forward to seeing them which she does about once a week. Daughter with concerns about her memory, has been more noticeable over past couple months. Not remembering things as well. Having to repeat conversations. Has also been noticing some vision issues, sometimes feels like sees double. Went to eye doctor but everything was ok. Still with lot of back pain. Never saw pain doc as while was there had to go to hospital (last spring). Review of Systems    Objective:   Physical Exam    Vitals:    11/02/18 1406   BP: 138/74   Site: Left Upper Arm   Position: Sitting   Cuff Size: Medium Adult   Pulse: 79   SpO2: 94%   Weight: 112 lb (50.8 kg)     Wt Readings from Last 3 Encounters:   11/02/18 112 lb (50.8 kg)   10/11/18 111 lb (50.3 kg)   10/09/18 110 lb (49.9 kg)     Body mass index is 21.16 kg/m². PHQ-9 Total Score: 0 (10/9/2018  4:28 PM)    Alert and oriented x 4 NAD, slender, well hydrated, well developed. Assessment:      Dustin Carranza was seen today for follow-up.     Diagnoses and all orders for this visit:    Orthostatic hypotension  Decrease noon and evening midodrine to 2.5mg  Monitor BP  If continue high in evening hold dose    Spinal stenosis of lumbar region with neurogenic claudication  Refer to pain mgmt again, Dr. Alessia Tobias, number given    Anorexia  -     MRI BRAIN [address]; Future  -     Comprehensive Metabolic Panel; Future  -     TSH with Reflex; Future  -     VITAMIN B12 & FOLATE; Future  Continue midodrine    Memory loss  -     MRI BRAIN WO CONTRAST; Future  -     Comprehensive Metabolic Panel; Future  -     TSH with Reflex; Future  -     VITAMIN B12 & FOLATE; Future  Check labs and MRI, ? Small strokes with spikes in BP  Monitor  If continues consider further eval, MOCA    Diplopia  -     MRI BRAIN WO CONTRAST; Future  -     Comprehensive Metabolic Panel; Future  -     TSH with Reflex; Future  -     VITAMIN B12 & FOLATE;  Future  Get MRI    Other orders  -     midodrine (PROAMATINE) 5 MG tablet; 5mg in AM, 2.5 mg noon, 2.5mg dinner if needed

## 2018-11-08 ENCOUNTER — TELEPHONE (OUTPATIENT)
Dept: FAMILY MEDICINE CLINIC | Age: 83
End: 2018-11-08

## 2018-11-09 ENCOUNTER — HOSPITAL ENCOUNTER (OUTPATIENT)
Dept: MRI IMAGING | Age: 83
Discharge: HOME | End: 2018-11-09
Payer: MEDICARE

## 2018-11-09 VITALS — DIASTOLIC BLOOD PRESSURE: 89 MMHG | HEART RATE: 76 BPM | OXYGEN SATURATION: 99 % | SYSTOLIC BLOOD PRESSURE: 174 MMHG

## 2018-11-09 DIAGNOSIS — R41.3 MEMORY LOSS: ICD-10-CM

## 2018-11-09 DIAGNOSIS — R63.0 ANOREXIA: ICD-10-CM

## 2018-11-09 DIAGNOSIS — H53.2 DIPLOPIA: ICD-10-CM

## 2018-11-09 PROCEDURE — 70551 MRI BRAIN STEM W/O DYE: CPT

## 2018-11-12 RX ORDER — FERROUS SULFATE 325(65) MG
TABLET ORAL
Qty: 30 TABLET | Refills: 12 | Status: SHIPPED | OUTPATIENT
Start: 2018-11-12

## 2018-12-06 ENCOUNTER — TELEPHONE (OUTPATIENT)
Dept: FAMILY MEDICINE CLINIC | Age: 83
End: 2018-12-06

## 2018-12-13 ENCOUNTER — HOSPITAL ENCOUNTER (OUTPATIENT)
Age: 83
Discharge: HOME | End: 2018-12-13
Payer: MEDICARE

## 2018-12-13 ENCOUNTER — APPOINTMENT (OUTPATIENT)
Dept: CT IMAGING | Age: 83
End: 2018-12-13
Payer: MEDICARE

## 2018-12-13 ENCOUNTER — APPOINTMENT (OUTPATIENT)
Dept: GENERAL RADIOLOGY | Age: 83
End: 2018-12-13
Payer: MEDICARE

## 2018-12-13 ENCOUNTER — HOSPITAL ENCOUNTER (OUTPATIENT)
Age: 83
Setting detail: OBSERVATION
Discharge: HOME HEALTH | End: 2018-12-17
Attending: EMERGENCY MEDICINE | Admitting: INTERNAL MEDICINE
Payer: MEDICARE

## 2018-12-13 ENCOUNTER — HOSPITAL ENCOUNTER (OUTPATIENT)
Dept: GENERAL RADIOLOGY | Age: 83
Discharge: HOME | End: 2018-12-13
Payer: MEDICARE

## 2018-12-13 DIAGNOSIS — G45.9 TIA (TRANSIENT ISCHEMIC ATTACK): ICD-10-CM

## 2018-12-13 DIAGNOSIS — M54.16 LUMBAR RADICULOPATHY: ICD-10-CM

## 2018-12-13 DIAGNOSIS — I16.1 HYPERTENSIVE EMERGENCY: Primary | ICD-10-CM

## 2018-12-13 PROBLEM — I16.0 HYPERTENSIVE URGENCY: Status: ACTIVE | Noted: 2018-12-13

## 2018-12-13 LAB
A/G RATIO: 1.7 (ref 1.1–2.2)
ALBUMIN SERPL-MCNC: 4.6 G/DL (ref 3.4–5)
ALP BLD-CCNC: 51 U/L (ref 40–129)
ALT SERPL-CCNC: 19 U/L (ref 10–40)
ANION GAP SERPL CALCULATED.3IONS-SCNC: 16 MMOL/L (ref 3–16)
APTT: 20.7 SEC (ref 26–36)
AST SERPL-CCNC: 23 U/L (ref 15–37)
BASOPHILS ABSOLUTE: 0.1 K/UL (ref 0–0.2)
BASOPHILS RELATIVE PERCENT: 1.1 %
BILIRUB SERPL-MCNC: 0.4 MG/DL (ref 0–1)
BUN BLDV-MCNC: 18 MG/DL (ref 7–20)
CALCIUM SERPL-MCNC: 9.7 MG/DL (ref 8.3–10.6)
CHLORIDE BLD-SCNC: 102 MMOL/L (ref 99–110)
CO2: 19 MMOL/L (ref 21–32)
CREAT SERPL-MCNC: 0.8 MG/DL (ref 0.6–1.2)
EOSINOPHILS ABSOLUTE: 0 K/UL (ref 0–0.6)
EOSINOPHILS RELATIVE PERCENT: 0.4 %
GFR AFRICAN AMERICAN: >60
GFR NON-AFRICAN AMERICAN: >60
GLOBULIN: 2.7 G/DL
GLUCOSE BLD-MCNC: 96 MG/DL (ref 70–99)
HCT VFR BLD CALC: 35.1 % (ref 36–48)
HEMOGLOBIN: 12.1 G/DL (ref 12–16)
INR BLD: 1.07 (ref 0.86–1.14)
LYMPHOCYTES ABSOLUTE: 2.5 K/UL (ref 1–5.1)
LYMPHOCYTES RELATIVE PERCENT: 43.2 %
MCH RBC QN AUTO: 32.6 PG (ref 26–34)
MCHC RBC AUTO-ENTMCNC: 34.5 G/DL (ref 31–36)
MCV RBC AUTO: 94.3 FL (ref 80–100)
MONOCYTES ABSOLUTE: 0.5 K/UL (ref 0–1.3)
MONOCYTES RELATIVE PERCENT: 8.2 %
NEUTROPHILS ABSOLUTE: 2.7 K/UL (ref 1.7–7.7)
NEUTROPHILS RELATIVE PERCENT: 47.1 %
PDW BLD-RTO: 12.4 % (ref 12.4–15.4)
PLATELET # BLD: 248 K/UL (ref 135–450)
PLATELET SLIDE REVIEW: ADEQUATE
PMV BLD AUTO: 7.2 FL (ref 5–10.5)
POTASSIUM SERPL-SCNC: 4.3 MMOL/L (ref 3.5–5.1)
PROTHROMBIN TIME: 12.2 SEC (ref 9.8–13)
RBC # BLD: 3.72 M/UL (ref 4–5.2)
REASON FOR REJECTION: NORMAL
REJECTED TEST: NORMAL
SLIDE REVIEW: ABNORMAL
SODIUM BLD-SCNC: 137 MMOL/L (ref 136–145)
TOTAL PROTEIN: 7.3 G/DL (ref 6.4–8.2)
TROPONIN: <0.01 NG/ML
WBC # BLD: 5.8 K/UL (ref 4–11)

## 2018-12-13 PROCEDURE — G0378 HOSPITAL OBSERVATION PER HR: HCPCS

## 2018-12-13 PROCEDURE — 96374 THER/PROPH/DIAG INJ IV PUSH: CPT

## 2018-12-13 PROCEDURE — 70498 CT ANGIOGRAPHY NECK: CPT

## 2018-12-13 PROCEDURE — 71045 X-RAY EXAM CHEST 1 VIEW: CPT

## 2018-12-13 PROCEDURE — 85610 PROTHROMBIN TIME: CPT

## 2018-12-13 PROCEDURE — 70450 CT HEAD/BRAIN W/O DYE: CPT

## 2018-12-13 PROCEDURE — 96375 TX/PRO/DX INJ NEW DRUG ADDON: CPT

## 2018-12-13 PROCEDURE — 93005 ELECTROCARDIOGRAM TRACING: CPT | Performed by: EMERGENCY MEDICINE

## 2018-12-13 PROCEDURE — 2500000003 HC RX 250 WO HCPCS

## 2018-12-13 PROCEDURE — 80053 COMPREHEN METABOLIC PANEL: CPT

## 2018-12-13 PROCEDURE — 6360000004 HC RX CONTRAST MEDICATION: Performed by: EMERGENCY MEDICINE

## 2018-12-13 PROCEDURE — 84484 ASSAY OF TROPONIN QUANT: CPT

## 2018-12-13 PROCEDURE — 6370000000 HC RX 637 (ALT 250 FOR IP): Performed by: PHYSICIAN ASSISTANT

## 2018-12-13 PROCEDURE — 99285 EMERGENCY DEPT VISIT HI MDM: CPT

## 2018-12-13 PROCEDURE — 72100 X-RAY EXAM L-S SPINE 2/3 VWS: CPT

## 2018-12-13 PROCEDURE — 6360000002 HC RX W HCPCS: Performed by: PHYSICIAN ASSISTANT

## 2018-12-13 PROCEDURE — 70496 CT ANGIOGRAPHY HEAD: CPT

## 2018-12-13 PROCEDURE — 85025 COMPLETE CBC W/AUTO DIFF WBC: CPT

## 2018-12-13 PROCEDURE — 85730 THROMBOPLASTIN TIME PARTIAL: CPT

## 2018-12-13 RX ORDER — METOCLOPRAMIDE HYDROCHLORIDE 5 MG/ML
10 INJECTION INTRAMUSCULAR; INTRAVENOUS ONCE
Status: COMPLETED | OUTPATIENT
Start: 2018-12-13 | End: 2018-12-13

## 2018-12-13 RX ORDER — DIPHENHYDRAMINE HYDROCHLORIDE 50 MG/ML
25 INJECTION INTRAMUSCULAR; INTRAVENOUS ONCE
Status: COMPLETED | OUTPATIENT
Start: 2018-12-13 | End: 2018-12-13

## 2018-12-13 RX ORDER — NITROGLYCERIN 0.4 MG/1
0.4 TABLET SUBLINGUAL EVERY 5 MIN PRN
Status: CANCELLED | OUTPATIENT
Start: 2018-12-13

## 2018-12-13 RX ORDER — LABETALOL HYDROCHLORIDE 5 MG/ML
5 INJECTION, SOLUTION INTRAVENOUS ONCE
Status: COMPLETED | OUTPATIENT
Start: 2018-12-13 | End: 2018-12-13

## 2018-12-13 RX ORDER — LABETALOL HYDROCHLORIDE 5 MG/ML
INJECTION, SOLUTION INTRAVENOUS
Status: COMPLETED
Start: 2018-12-13 | End: 2018-12-13

## 2018-12-13 RX ORDER — ASPIRIN 81 MG/1
324 TABLET, CHEWABLE ORAL ONCE
Status: COMPLETED | OUTPATIENT
Start: 2018-12-13 | End: 2018-12-13

## 2018-12-13 RX ORDER — LEVOTHYROXINE SODIUM 0.05 MG/1
1 TABLET ORAL DAILY
Status: CANCELLED | OUTPATIENT
Start: 2018-12-14

## 2018-12-13 RX ORDER — MIDODRINE HYDROCHLORIDE 5 MG/1
2.5 TABLET ORAL 2 TIMES DAILY WITH MEALS
Status: CANCELLED | OUTPATIENT
Start: 2018-12-14

## 2018-12-13 RX ORDER — FLECAINIDE ACETATE 50 MG/1
50 TABLET ORAL EVERY 12 HOURS SCHEDULED
Status: CANCELLED | OUTPATIENT
Start: 2018-12-13

## 2018-12-13 RX ADMIN — ASPIRIN 81 MG 324 MG: 81 TABLET ORAL at 22:14

## 2018-12-13 RX ADMIN — DIPHENHYDRAMINE HYDROCHLORIDE 25 MG: 50 INJECTION, SOLUTION INTRAMUSCULAR; INTRAVENOUS at 20:33

## 2018-12-13 RX ADMIN — IOPAMIDOL 75 ML: 755 INJECTION, SOLUTION INTRAVENOUS at 20:15

## 2018-12-13 RX ADMIN — LABETALOL HYDROCHLORIDE 5 MG: 5 INJECTION, SOLUTION INTRAVENOUS at 20:33

## 2018-12-13 RX ADMIN — METOCLOPRAMIDE 10 MG: 5 INJECTION, SOLUTION INTRAMUSCULAR; INTRAVENOUS at 20:33

## 2018-12-13 ASSESSMENT — ENCOUNTER SYMPTOMS
EYE DISCHARGE: 0
WHEEZING: 0
ALLERGIC/IMMUNOLOGIC NEGATIVE: 1
PHOTOPHOBIA: 0
VOMITING: 0
ABDOMINAL DISTENTION: 0
EYE ITCHING: 0
DIARRHEA: 0
EYE REDNESS: 0
CONSTIPATION: 0
COUGH: 0
STRIDOR: 0
BACK PAIN: 0
COLOR CHANGE: 0
NAUSEA: 0
SHORTNESS OF BREATH: 0
EYE PAIN: 0
ABDOMINAL PAIN: 0

## 2018-12-13 NOTE — ED NOTES
Bed: 30  Expected date:   Expected time:   Means of arrival:   Comments:  FF [address] [place], [place]  12/13/18 1735

## 2018-12-14 LAB
EKG ATRIAL RATE: 91 BPM
EKG DIAGNOSIS: NORMAL
EKG Q-T INTERVAL: 364 MS
EKG QRS DURATION: 80 MS
EKG QTC CALCULATION (BAZETT): 447 MS
EKG R AXIS: 21 DEGREES
EKG T AXIS: 72 DEGREES
EKG VENTRICULAR RATE: 91 BPM

## 2018-12-14 PROCEDURE — 93010 ELECTROCARDIOGRAM REPORT: CPT | Performed by: INTERNAL MEDICINE

## 2018-12-14 PROCEDURE — 6370000000 HC RX 637 (ALT 250 FOR IP): Performed by: INTERNAL MEDICINE

## 2018-12-14 PROCEDURE — 6370000000 HC RX 637 (ALT 250 FOR IP): Performed by: HOSPITALIST

## 2018-12-14 PROCEDURE — 2580000003 HC RX 258: Performed by: INTERNAL MEDICINE

## 2018-12-14 PROCEDURE — 96375 TX/PRO/DX INJ NEW DRUG ADDON: CPT

## 2018-12-14 PROCEDURE — G0378 HOSPITAL OBSERVATION PER HR: HCPCS

## 2018-12-14 RX ORDER — SODIUM CHLORIDE 0.9 % (FLUSH) 0.9 %
10 SYRINGE (ML) INJECTION PRN
Status: DISCONTINUED | OUTPATIENT
Start: 2018-12-14 | End: 2018-12-17 | Stop reason: HOSPADM

## 2018-12-14 RX ORDER — IPRATROPIUM BROMIDE AND ALBUTEROL SULFATE 2.5; .5 MG/3ML; MG/3ML
1 SOLUTION RESPIRATORY (INHALATION) EVERY 4 HOURS PRN
Status: DISCONTINUED | OUTPATIENT
Start: 2018-12-14 | End: 2018-12-17 | Stop reason: HOSPADM

## 2018-12-14 RX ORDER — FLECAINIDE ACETATE 50 MG/1
50 TABLET ORAL 2 TIMES DAILY
Status: DISCONTINUED | OUTPATIENT
Start: 2018-12-14 | End: 2018-12-17 | Stop reason: HOSPADM

## 2018-12-14 RX ORDER — SODIUM CHLORIDE 0.9 % (FLUSH) 0.9 %
10 SYRINGE (ML) INJECTION EVERY 12 HOURS SCHEDULED
Status: DISCONTINUED | OUTPATIENT
Start: 2018-12-14 | End: 2018-12-17 | Stop reason: HOSPADM

## 2018-12-14 RX ORDER — MAGNESIUM SULFATE 1 G/100ML
1 INJECTION INTRAVENOUS PRN
Status: DISCONTINUED | OUTPATIENT
Start: 2018-12-14 | End: 2018-12-17 | Stop reason: HOSPADM

## 2018-12-14 RX ORDER — LANOLIN ALCOHOL/MO/W.PET/CERES
1000 CREAM (GRAM) TOPICAL DAILY
Status: DISCONTINUED | OUTPATIENT
Start: 2018-12-14 | End: 2018-12-17 | Stop reason: HOSPADM

## 2018-12-14 RX ORDER — ACETAMINOPHEN 325 MG/1
650 TABLET ORAL EVERY 4 HOURS PRN
Status: DISCONTINUED | OUTPATIENT
Start: 2018-12-14 | End: 2018-12-17 | Stop reason: HOSPADM

## 2018-12-14 RX ORDER — ONDANSETRON 2 MG/ML
4 INJECTION INTRAMUSCULAR; INTRAVENOUS EVERY 6 HOURS PRN
Status: DISCONTINUED | OUTPATIENT
Start: 2018-12-14 | End: 2018-12-17 | Stop reason: HOSPADM

## 2018-12-14 RX ORDER — LEVOTHYROXINE SODIUM 0.03 MG/1
50 TABLET ORAL DAILY
Status: DISCONTINUED | OUTPATIENT
Start: 2018-12-14 | End: 2018-12-17 | Stop reason: HOSPADM

## 2018-12-14 RX ORDER — POTASSIUM CHLORIDE 7.45 MG/ML
10 INJECTION INTRAVENOUS PRN
Status: DISCONTINUED | OUTPATIENT
Start: 2018-12-14 | End: 2018-12-17 | Stop reason: HOSPADM

## 2018-12-14 RX ADMIN — LEVETIRACETAM 750 MG: 500 TABLET ORAL at 21:42

## 2018-12-14 RX ADMIN — SERTRALINE 100 MG: 50 TABLET, FILM COATED ORAL at 14:32

## 2018-12-14 RX ADMIN — FLECAINIDE ACETATE 50 MG: 50 TABLET ORAL at 01:23

## 2018-12-14 RX ADMIN — FLECAINIDE ACETATE 50 MG: 50 TABLET ORAL at 21:30

## 2018-12-14 RX ADMIN — ACETAMINOPHEN 650 MG: 325 TABLET, FILM COATED ORAL at 19:01

## 2018-12-14 RX ADMIN — Medication 10 ML: at 21:31

## 2018-12-14 RX ADMIN — Medication 10 ML: at 09:03

## 2018-12-14 RX ADMIN — RIVAROXABAN 15 MG: 15 TABLET, FILM COATED ORAL at 01:23

## 2018-12-14 RX ADMIN — LEVOTHYROXINE SODIUM 50 MCG: 25 TABLET ORAL at 14:32

## 2018-12-14 RX ADMIN — FLECAINIDE ACETATE 50 MG: 50 TABLET ORAL at 09:03

## 2018-12-14 RX ADMIN — CYANOCOBALAMIN TAB 1000 MCG 1000 MCG: 1000 TAB at 14:32

## 2018-12-14 RX ADMIN — RIVAROXABAN 15 MG: 15 TABLET, FILM COATED ORAL at 18:19

## 2018-12-14 ASSESSMENT — PAIN SCALES - GENERAL
PAINLEVEL_OUTOF10: 0
PAINLEVEL_OUTOF10: 3
PAINLEVEL_OUTOF10: 0

## 2018-12-14 ASSESSMENT — PAIN DESCRIPTION - PAIN TYPE: TYPE: ACUTE PAIN

## 2018-12-14 ASSESSMENT — PAIN DESCRIPTION - DESCRIPTORS: DESCRIPTORS: HEADACHE

## 2018-12-14 ASSESSMENT — PAIN DESCRIPTION - LOCATION: LOCATION: HEAD

## 2018-12-14 NOTE — H&P
of  care above. DISPOSITION:  Observation telemetry.         Juan Moe MD    D: 12/14/2018 4:34:06       T: 12/14/2018 5:33:55     [email or web address]  Job#: [id number]     Doc#: [id number]    CC:

## 2018-12-14 NOTE — ED PROVIDER NOTES
Cardiovascular: Normal rate. Pulmonary/Chest: Effort normal and breath sounds normal. No stridor. Abdominal: Soft. Bowel sounds are normal. She exhibits no abdominal bruit and no pulsatile midline mass. There is no tenderness. There is no rigidity, no rebound, no guarding, no CVA tenderness, no tenderness at McBurney's point and negative James's sign. Musculoskeletal: Normal range of motion. Lymphadenopathy:     She has no cervical adenopathy. Neurological: She is alert and oriented to person, place, and time. Cranial nerve deficit: subjective numbness to the left cheek and left facial droop. left lower leg numbess. no appreciable slurred speech at this time. other CN intact. No pronator drift or slurred speech or word finding difficulty  Normal finger to nose coordination. Normal rapid alternating hand movement. Modified federico hallpike  Negative without nystagmus  Normal heel to shin coordination  Symmetrical upper and lower extremity strength   Skin: Skin is warm and dry. Capillary refill takes less than 2 seconds. No rash noted. She is not diaphoretic. No erythema. No pallor. Psychiatric: She has a normal mood and affect. Her behavior is normal.   Nursing note and vitals reviewed. Her symptoms resolved after blood pressure control.  2100  DIAGNOSTIC RESULTS   LABS:    Labs Reviewed   COMPREHENSIVE METABOLIC PANEL - Abnormal; Notable for the following:        Result Value    CO2 19 (*)     All other components within normal limits    Narrative:     Performed at:  [facility] [address], [facility], [address]   Phone [phone]   CBC WITH AUTO DIFFERENTIAL - Abnormal; Notable for the following:     RBC 3.72 (*)     Hematocrit 35.1 (*)     All other components within normal limits    Narrative:     Ann-Marie LAUGHLIN tel. [phone],  Rejected Test Name PT,PTT/Called to: Pasquale Gallardo, 12/13/2018 19:33, by GNSQN  Performed at:  [facility] Nodule size equals 6-8 mm   In a low-risk patient, CT at 6-12 months, then consider CT at 18-24 months. In a high-risk patient, CT at 6-12 months, then CT at 18-24 months. Nodule size greater than 8 mm         In a low-risk patient, consider CT at 3 months, PET/CT, or tissue sampling. In a high-risk patient, consider CT at 3 months, PET/CT, or tissue sampling.      - Low risk patients include individuals with minimal or absent history of   smoking and other known risk factors. - High risk patients include individuals with a history or smoking or known   risk factors. Radiology 2017 http://pubs. rsna.org/doi/full/10.1148/radiol. 2058887424         CTA HEAD W CONTRAST   Final Result   No flow-limiting stenosis or branch occlusion detected within the head or   neck. Incidentally noted left apical lung nodule. RECOMMENDATIONS:   Fleischner Society guidelines for follow-up and management of incidentally   detected pulmonary nodules:      Single Solid Nodule:      Nodule size less than 6 mm   In a low-risk patient, no routine follow-up. In a high-risk patient, optional CT at 12 months. Nodule size equals 6-8 mm   In a low-risk patient, CT at 6-12 months, then consider CT at 18-24 months. In a high-risk patient, CT at 6-12 months, then CT at 18-24 months. Nodule size greater than 8 mm         In a low-risk patient, consider CT at 3 months, PET/CT, or tissue sampling. In a high-risk patient, consider CT at 3 months, PET/CT, or tissue sampling.      - Low risk patients include individuals with minimal or absent history of   smoking and other known risk factors. - High risk patients include individuals with a history or smoking or known   risk factors. Radiology 2017 http://pubs. rsna.org/doi/full/10.1148/radiol. 4657515701         CT HEAD WO CONTRAST   Final Result   No acute intracranial hemorrhage. No acute large vessel infarct.       Findings were discussed with DR PINON at 8:33 pm on 12/13/2018. XR CHEST PORTABLE   Final Result   No acute cardiopulmonary disease. Xr Lumbar Spine (2-3 Views)    Result Date: 12/13/2018  EXAMINATION: 3 XRAY VIEWS OF THE LUMBAR SPINE 12/13/2018 12:45 pm COMPARISON: 09/15/2018 HISTORY: ORDERING SYSTEM PROVIDED HISTORY: Lumbar radiculopathy TECHNOLOGIST PROVIDED HISTORY: Ordering Physician Provided Reason for Exam: Radiculopathy, lumbar region. Acuity: Chronic Type of Exam: Unknown FINDINGS: Grade 1 anterolisthesis of L4 on L5 measuring 7 mm is similar to the prior exam.  Lumbar alignment is otherwise maintained. Moderate disc height loss at L4-5, not progressed from the prior exam.  Mild-moderate facet degenerative changes in the mid-lower lumbar spine have not progressed. Disc heights and vertebral body heights are otherwise maintained. Sacroiliac joints are maintained. Grade 1 anterolisthesis of L4 on L5 with underlying mild-moderate degenerative changes in the lower lumbar spine. Findings have not progressed from 09/15/2018. Xr Chest Portable    Result Date: 12/13/2018  EXAMINATION: SINGLE XRAY VIEW OF THE CHEST 12/13/2018 7:05 pm COMPARISON: Radiograph 08/05/2018 HISTORY: ORDERING SYSTEM PROVIDED HISTORY: hypertension TECHNOLOGIST PROVIDED HISTORY: Reason for exam:->hypertension Ordering Physician Provided Reason for Exam: Hypertension (Hypertension at PCP office today, 180/? - pt symptomatic with headache, states it feels much better now than earlier today 3/10) Acuity: Unknown Type of Exam: Unknown FINDINGS: Left subclavian pacer wires are unchanged. No pneumothorax or effusion. No focal consolidation. No acute osseous abnormality. No acute cardiopulmonary disease. PROCEDURES   Unless otherwise noted below, none     Procedures    CRITICAL CARE TIME   Critical Care  There was a high probability of life-threatening clinical deterioration in the patient's condition requiring my urgent intervention.

## 2018-12-14 NOTE — ED PROVIDER NOTES
Attending Supervisory Note/Shared Visit   I have personally performed a face to face diagnostic evaluation on this patient. I have reviewed the mid-levels findings and agree. History and Exam by me shows: This is an 49-year-old female brought to emergency department for evaluation of elevated blood pressure. Patient reports being in usual state of health until earlier on today when symptoms started. Patient denies fevers no recent changes of diet or medications no chest pain shortness breath no history of DVTs or pulmonary emboli does report that when she took her blood pressure today was noted that was elevated. She says that she has slight headache associated with this. She went and followed up with her primary care physician who advised her to go to the emergency department for evaluation. Patient does report that she is asymptomatic at this time and currently has no complaints at all. Physical exam upon arrival patient's afebrile vital signs noted above in general well-appearing well-nourished female in no acute distress chest regular rhythm no murmurs rubs or gallops abdomen soft nontender nondistended no rebound no guarding no peritoneal signs lungs clear to auscultation bilaterally no wheezes rales rhonchi or stridor back no cervical thoracic or lumbosacral bony tenderness no CVA tenderness extremities no unilateral leg swelling or tenderness behind either one of her calves    Patient's EKG as interpreted by me    Medical decision making  This is an 49-year-old female brought to emergency department for evaluation of elevated blood pressure. Based on patient's history and physical there could be multiple causes of patient's symptoms would be concerned about possible renal discussion other possibilities for patient's symptoms do include electrolyte abnormalities medication noncompliance among others.   Patient appears clinically quite well in no significant distress or discomfort has no current complaints at this time. I was called in to patient's room at 8:10. .  Nurse reports that the patient was having slight facial droop on the left in addition to having decrease in sensation on the left as well. On my evaluation the patient did have a left down or turning corner of her mouth. Tongue deviates slightly to the right. Slight decrease in sensation to left upper and left lower extremity to light touch but no objective weakness that I was able to appreciate. Normal finger to nose normal rapid alternating movements 2+ DTRs at patellar tendons no slurred speech    A stroke alert was activated at this time. I did discuss the case with Dr. Donovan Whittington, stroke neurology. I reviewed the patient's clinical presentation as well as her reason for coming to the emergency department. I discussed the findings with her. She felt that the patient was not a TPA candidate at this time    Clinical impression 1 hypertension 2.   CVA     Kwan Ann MD  12/13/18 0978

## 2018-12-15 LAB
ANION GAP SERPL CALCULATED.3IONS-SCNC: 13 MMOL/L (ref 3–16)
BUN BLDV-MCNC: 14 MG/DL (ref 7–20)
CALCIUM SERPL-MCNC: 9.7 MG/DL (ref 8.3–10.6)
CHLORIDE BLD-SCNC: 104 MMOL/L (ref 99–110)
CO2: 23 MMOL/L (ref 21–32)
CREAT SERPL-MCNC: 0.8 MG/DL (ref 0.6–1.2)
GFR AFRICAN AMERICAN: >60
GFR NON-AFRICAN AMERICAN: >60
GLUCOSE BLD-MCNC: 101 MG/DL (ref 70–99)
HCT VFR BLD CALC: 33.7 % (ref 36–48)
HEMOGLOBIN: 11.6 G/DL (ref 12–16)
MAGNESIUM: 2 MG/DL (ref 1.8–2.4)
MCH RBC QN AUTO: 32.3 PG (ref 26–34)
MCHC RBC AUTO-ENTMCNC: 34.3 G/DL (ref 31–36)
MCV RBC AUTO: 94.2 FL (ref 80–100)
PDW BLD-RTO: 12.5 % (ref 12.4–15.4)
PLATELET # BLD: 241 K/UL (ref 135–450)
PMV BLD AUTO: 7.5 FL (ref 5–10.5)
POTASSIUM SERPL-SCNC: 4.6 MMOL/L (ref 3.5–5.1)
RBC # BLD: 3.58 M/UL (ref 4–5.2)
SODIUM BLD-SCNC: 140 MMOL/L (ref 136–145)
TROPONIN: <0.01 NG/ML
WBC # BLD: 3.8 K/UL (ref 4–11)

## 2018-12-15 PROCEDURE — 2580000003 HC RX 258: Performed by: INTERNAL MEDICINE

## 2018-12-15 PROCEDURE — 6370000000 HC RX 637 (ALT 250 FOR IP): Performed by: INTERNAL MEDICINE

## 2018-12-15 PROCEDURE — 36415 COLL VENOUS BLD VENIPUNCTURE: CPT

## 2018-12-15 PROCEDURE — 80048 BASIC METABOLIC PNL TOTAL CA: CPT

## 2018-12-15 PROCEDURE — 84484 ASSAY OF TROPONIN QUANT: CPT

## 2018-12-15 PROCEDURE — 93005 ELECTROCARDIOGRAM TRACING: CPT | Performed by: INTERNAL MEDICINE

## 2018-12-15 PROCEDURE — G0378 HOSPITAL OBSERVATION PER HR: HCPCS

## 2018-12-15 PROCEDURE — 85027 COMPLETE CBC AUTOMATED: CPT

## 2018-12-15 PROCEDURE — 83735 ASSAY OF MAGNESIUM: CPT

## 2018-12-15 PROCEDURE — 94640 AIRWAY INHALATION TREATMENT: CPT

## 2018-12-15 RX ADMIN — LEVETIRACETAM 750 MG: 500 TABLET ORAL at 21:43

## 2018-12-15 RX ADMIN — RIVAROXABAN 15 MG: 15 TABLET, FILM COATED ORAL at 18:56

## 2018-12-15 RX ADMIN — TIOTROPIUM BROMIDE 18 MCG: 18 CAPSULE ORAL; RESPIRATORY (INHALATION) at 08:08

## 2018-12-15 RX ADMIN — FLECAINIDE ACETATE 50 MG: 50 TABLET ORAL at 21:43

## 2018-12-15 RX ADMIN — LEVOTHYROXINE SODIUM 50 MCG: 25 TABLET ORAL at 10:05

## 2018-12-15 RX ADMIN — Medication 10 ML: at 10:05

## 2018-12-15 RX ADMIN — SERTRALINE 100 MG: 50 TABLET, FILM COATED ORAL at 10:05

## 2018-12-15 RX ADMIN — Medication 10 ML: at 21:43

## 2018-12-15 RX ADMIN — LEVETIRACETAM 750 MG: 500 TABLET ORAL at 10:05

## 2018-12-15 RX ADMIN — CYANOCOBALAMIN TAB 1000 MCG 1000 MCG: 1000 TAB at 10:05

## 2018-12-15 RX ADMIN — FLECAINIDE ACETATE 50 MG: 50 TABLET ORAL at 10:04

## 2018-12-15 ASSESSMENT — PAIN SCALES - GENERAL
PAINLEVEL_OUTOF10: 0
PAINLEVEL_OUTOF10: 5
PAINLEVEL_OUTOF10: 0
PAINLEVEL_OUTOF10: 0
PAINLEVEL_OUTOF10: 1

## 2018-12-15 ASSESSMENT — PAIN DESCRIPTION - ONSET
ONSET: SUDDEN
ONSET: SUDDEN

## 2018-12-15 ASSESSMENT — PAIN DESCRIPTION - PROGRESSION: CLINICAL_PROGRESSION: RAPIDLY IMPROVING

## 2018-12-15 ASSESSMENT — PAIN DESCRIPTION - PAIN TYPE
TYPE: ACUTE PAIN
TYPE: ACUTE PAIN

## 2018-12-15 ASSESSMENT — PAIN DESCRIPTION - LOCATION
LOCATION: CHEST
LOCATION: CHEST

## 2018-12-15 ASSESSMENT — PAIN DESCRIPTION - DESCRIPTORS
DESCRIPTORS: TIGHTNESS
DESCRIPTORS: TIGHTNESS

## 2018-12-15 NOTE — PLAN OF CARE
Problem: Falls - Risk of:  Goal: Will remain free from falls  Will remain free from falls   Pt has fluctuating BP's. Pt at high risk for falls. See Cami Oswald Fall Risk score. Bed locked and in lowest position, bed alarm on. Call light and tray table within reach. Pt verbalizes understanding to call out before getting oob.

## 2018-12-15 NOTE — PROGRESS NOTES
high-risk patient, optional CT at 12 months. Nodule size equals 6-8 mm   In a low-risk patient, CT at 6-12 months, then consider CT at 18-24 months. In a high-risk patient, CT at 6-12 months, then CT at 18-24 months. Nodule size greater than 8 mm         In a low-risk patient, consider CT at 3 months, PET/CT, or tissue sampling. In a high-risk patient, consider CT at 3 months, PET/CT, or tissue sampling.      - Low risk patients include individuals with minimal or absent history of   smoking and other known risk factors. - High risk patients include individuals with a history or smoking or known   risk factors. Radiology 2017 http://pubs. rsna.org/doi/full/10.1148/radiol. 0271833734         CT HEAD WO CONTRAST   Final Result   No acute intracranial hemorrhage. No acute large vessel infarct. Findings were discussed with DR PINON at 8:33 pm on 12/13/2018. XR CHEST PORTABLE   Final Result   No acute cardiopulmonary disease. Assessment/Plan:    Active Hospital Problems    Diagnosis Date Noted    Hypertensive urgency [I16.0] 12/13/2018     1. Hypertensive urgency, B  bP very labile, she take midodrine at home for low bp  Check orthostatic every shift    2. PAF, on flecainide and xarelto  Has Dual camber PM    3.  COPD, continue bronchodilators     DVT Prophylaxis: xarelto  Diet: DIET CARDIAC;  Code Status: Full Code    PT/OT Eval Status: pending     Dispo - inpatient ,     Yenny Fox MD

## 2018-12-16 LAB
EKG ATRIAL RATE: 74 BPM
EKG DIAGNOSIS: NORMAL
EKG P AXIS: 55 DEGREES
EKG P-R INTERVAL: 208 MS
EKG Q-T INTERVAL: 408 MS
EKG QRS DURATION: 88 MS
EKG QTC CALCULATION (BAZETT): 452 MS
EKG R AXIS: 21 DEGREES
EKG T AXIS: 59 DEGREES
EKG VENTRICULAR RATE: 74 BPM

## 2018-12-16 PROCEDURE — 97161 PT EVAL LOW COMPLEX 20 MIN: CPT

## 2018-12-16 PROCEDURE — 6370000000 HC RX 637 (ALT 250 FOR IP): Performed by: INTERNAL MEDICINE

## 2018-12-16 PROCEDURE — 94760 N-INVAS EAR/PLS OXIMETRY 1: CPT

## 2018-12-16 PROCEDURE — G8978 MOBILITY CURRENT STATUS: HCPCS

## 2018-12-16 PROCEDURE — G8979 MOBILITY GOAL STATUS: HCPCS

## 2018-12-16 PROCEDURE — 2580000003 HC RX 258: Performed by: INTERNAL MEDICINE

## 2018-12-16 PROCEDURE — G8987 SELF CARE CURRENT STATUS: HCPCS

## 2018-12-16 PROCEDURE — 93010 ELECTROCARDIOGRAM REPORT: CPT | Performed by: INTERNAL MEDICINE

## 2018-12-16 PROCEDURE — G8988 SELF CARE GOAL STATUS: HCPCS

## 2018-12-16 PROCEDURE — 97166 OT EVAL MOD COMPLEX 45 MIN: CPT

## 2018-12-16 PROCEDURE — 6370000000 HC RX 637 (ALT 250 FOR IP): Performed by: HOSPITALIST

## 2018-12-16 PROCEDURE — G0378 HOSPITAL OBSERVATION PER HR: HCPCS

## 2018-12-16 PROCEDURE — 94640 AIRWAY INHALATION TREATMENT: CPT

## 2018-12-16 PROCEDURE — 97535 SELF CARE MNGMENT TRAINING: CPT

## 2018-12-16 PROCEDURE — 99220 PR INITIAL OBSERVATION CARE/DAY 70 MINUTES: CPT | Performed by: INTERNAL MEDICINE

## 2018-12-16 PROCEDURE — 97530 THERAPEUTIC ACTIVITIES: CPT

## 2018-12-16 RX ORDER — FLUDROCORTISONE ACETATE 0.1 MG/1
0.1 TABLET ORAL DAILY
Status: DISCONTINUED | OUTPATIENT
Start: 2018-12-16 | End: 2018-12-17 | Stop reason: HOSPADM

## 2018-12-16 RX ORDER — MIDODRINE HYDROCHLORIDE 5 MG/1
5 TABLET ORAL EVERY MORNING
Status: DISCONTINUED | OUTPATIENT
Start: 2018-12-16 | End: 2018-12-17 | Stop reason: HOSPADM

## 2018-12-16 RX ADMIN — LEVOTHYROXINE SODIUM 50 MCG: 25 TABLET ORAL at 08:09

## 2018-12-16 RX ADMIN — FLECAINIDE ACETATE 50 MG: 50 TABLET ORAL at 08:09

## 2018-12-16 RX ADMIN — LEVETIRACETAM 750 MG: 500 TABLET ORAL at 21:13

## 2018-12-16 RX ADMIN — Medication 10 ML: at 21:13

## 2018-12-16 RX ADMIN — CYANOCOBALAMIN TAB 1000 MCG 1000 MCG: 1000 TAB at 08:10

## 2018-12-16 RX ADMIN — LEVETIRACETAM 750 MG: 500 TABLET ORAL at 08:10

## 2018-12-16 RX ADMIN — TIOTROPIUM BROMIDE 18 MCG: 18 CAPSULE ORAL; RESPIRATORY (INHALATION) at 08:46

## 2018-12-16 RX ADMIN — ACETAMINOPHEN 650 MG: 325 TABLET, FILM COATED ORAL at 15:57

## 2018-12-16 RX ADMIN — SERTRALINE 100 MG: 50 TABLET, FILM COATED ORAL at 08:09

## 2018-12-16 RX ADMIN — FLECAINIDE ACETATE 50 MG: 50 TABLET ORAL at 21:13

## 2018-12-16 RX ADMIN — RIVAROXABAN 15 MG: 15 TABLET, FILM COATED ORAL at 17:49

## 2018-12-16 RX ADMIN — Medication 10 ML: at 08:10

## 2018-12-16 ASSESSMENT — PAIN DESCRIPTION - FREQUENCY: FREQUENCY: INTERMITTENT

## 2018-12-16 ASSESSMENT — PAIN DESCRIPTION - ONSET: ONSET: SUDDEN

## 2018-12-16 ASSESSMENT — PAIN SCALES - GENERAL
PAINLEVEL_OUTOF10: 0
PAINLEVEL_OUTOF10: 5

## 2018-12-16 ASSESSMENT — PAIN DESCRIPTION - PAIN TYPE: TYPE: ACUTE PAIN

## 2018-12-16 ASSESSMENT — PAIN DESCRIPTION - LOCATION: LOCATION: HEAD

## 2018-12-16 ASSESSMENT — PAIN DESCRIPTION - DESCRIPTORS: DESCRIPTORS: HEADACHE

## 2018-12-16 NOTE — PROGRESS NOTES
Occupational Therapy   Occupational Therapy Initial Assessment  Date: 2018   Patient Name: Brock Hyde  MRN: [id number]     : 1931    Date of Service: 2018    Discharge Recommendations: Brock Hyde scored a 19/24 on the AM-PAC ADL Inpatient form. Current research shows that an AM-PAC score of 18 or greater is typically associated with a discharge to the patient's home setting. Based on the patients AM-PAC score and their current ADL deficits, it is recommended that the patient have 2-3 sessions per week of Occupational Therapy at d/c to increase the patients independence. Patient Diagnosis(es): The primary encounter diagnosis was Hypertensive emergency. A diagnosis of TIA (transient ischemic attack) was also pertinent to this visit. has a past medical history of Anemia; Asthma; Blood circulation, collateral; Clostridium difficile diarrhea; Constipation; COPD (chronic obstructive pulmonary disease) (HCC); DDD (degenerative disc disease), cervical; Essential hypertension; GERD (gastroesophageal reflux disease); History of traumatic head injury; Hypothyroidism; Major depressive disorder; Oropharyngeal dysphagia; Orthostatic hypotension; Osteopenia; Other disorders of kidney and ureter in diseases classified elsewhere; Pacemaker; Paroxysmal atrial fibrillation ([address] Partial epilepsy, with intractable epilepsy, pharmacoresistant ([address] Personal history of malignant neoplasm of cervix uteri; Personal history of PE (pulmonary embolism); Pneumonia; Post traumatic seizure ([address] Seizures ([address] Sinus node dysfunction ([address] and Spinal stenosis of lumbar region. has a past surgical history that includes refugio and bso (cervix removed) (2010); Tubal ligation; Rotator cuff repair (Right); Upper gastrointestinal endoscopy (2016); Colonoscopy (2013); pacemaker placement (Left); and pacemaker placement (Left). reports always without her walker, states BP typically very low in the am       Objective   Vision: Impaired  Vision Exceptions: Wears glasses for reading  Hearing: Within functional limits    Orientation  Overall Orientation Status: Within Normal Limits  Observation/Palpation  Posture: Good  Balance  Sitting Balance: Independent  Standing Balance: Contact guard assistance  Standing Balance  Time: ~5 min  Activity: toileting, hand washing, ambulation  Comment: pt used RW to ambulate and perform toileting and grooming skills   Functional Mobility  Functional - Mobility Device: Rolling Walker  Activity: To/from bathroom (and w/in room)  Assist Level: Contact guard assistance  Toilet Transfers  Toilet - Technique: Ambulating  Equipment Used: Standard toilet  Toilet Transfer: Contact guard assistance  Toilet Transfers Comments: CGA w/ RW secondary to hypertensive episodes  ADL  Toileting: Contact guard assistance (CGA secondary to hypertensive episodes )  Additional Comments: BP: supine: 129/75, sitting EOB: 127/73 O2 97%, standin/66, Following bathroom/ambulation w/in room: 88/50, sittin/81. pt ambulated to restroom w/ RW w/ CGA secondary to hypertensive episodes. Tone RUE  RUE Tone: Normotonic  Tone LUE  LUE Tone: Normotonic  Coordination  Movements Are Fluid And Coordinated: Yes     Bed mobility  Supine to Sit: Supervision  Scooting: Supervision        Cognition  Overall Cognitive Status: WNL  Perception  Overall Perceptual Status: WFL     Sensation  Overall Sensation Status: WNL        LUE AROM (degrees)  LUE AROM : WNL  Left Hand AROM (degrees)  Left Hand AROM: WNL  RUE AROM (degrees)  RUE AROM : WNL  Right Hand AROM (degrees)  Right Hand AROM: WNL  LUE Strength  Gross LUE Strength: WFL  RUE Strength  Gross RUE Strength: WFL                  Assessment   Performance deficits / Impairments: Decreased functional mobility ; Decreased ADL status; Decreased endurance  Assessment: pt assessed secondary to

## 2018-12-16 NOTE — PROGRESS NOTES
nodules:      Single Solid Nodule:      Nodule size less than 6 mm   In a low-risk patient, no routine follow-up. In a high-risk patient, optional CT at 12 months. Nodule size equals 6-8 mm   In a low-risk patient, CT at 6-12 months, then consider CT at 18-24 months. In a high-risk patient, CT at 6-12 months, then CT at 18-24 months. Nodule size greater than 8 mm         In a low-risk patient, consider CT at 3 months, PET/CT, or tissue sampling. In a high-risk patient, consider CT at 3 months, PET/CT, or tissue sampling.      - Low risk patients include individuals with minimal or absent history of   smoking and other known risk factors. - High risk patients include individuals with a history or smoking or known   risk factors. Radiology 2017 http://pubs. rsna.org/doi/full/10.1148/radiol. 8779141438         CT HEAD WO CONTRAST   Final Result   No acute intracranial hemorrhage. No acute large vessel infarct. Findings were discussed with DR PINON at 8:33 pm on 12/13/2018. XR CHEST PORTABLE   Final Result   No acute cardiopulmonary disease. Assessment/Plan:    Active Hospital Problems    Diagnosis Date Noted    Hypertensive urgency [I16.0] 12/13/2018     1. Hypertensive urgency,   BP very labile, she take midodrine at home for low bp  Check orthostatic every shift    2. PAF, on flecainide and xarelto  Has Dual camber PM  Needs device interrogation    3. COPD, continue bronchodilators     4.  Recurrent dizziness, presyncope:- has PM, consulted cardiology for further recommendations     DVT Prophylaxis: xarelto  Diet: DIET CARDIAC;  Code Status: Full Code    PT/OT Eval Status: pending     Dispo - inpatient ,     Blessing Enamorado MD

## 2018-12-17 ENCOUNTER — APPOINTMENT (OUTPATIENT)
Dept: CT IMAGING | Age: 83
End: 2018-12-17
Payer: MEDICARE

## 2018-12-17 VITALS
RESPIRATION RATE: 16 BRPM | SYSTOLIC BLOOD PRESSURE: 155 MMHG | BODY MASS INDEX: 21.01 KG/M2 | HEIGHT: 61 IN | OXYGEN SATURATION: 96 % | HEART RATE: 68 BPM | WEIGHT: 111.3 LBS | TEMPERATURE: 98.2 F | DIASTOLIC BLOOD PRESSURE: 72 MMHG

## 2018-12-17 PROCEDURE — G0378 HOSPITAL OBSERVATION PER HR: HCPCS

## 2018-12-17 PROCEDURE — 2580000003 HC RX 258: Performed by: INTERNAL MEDICINE

## 2018-12-17 PROCEDURE — 6370000000 HC RX 637 (ALT 250 FOR IP): Performed by: INTERNAL MEDICINE

## 2018-12-17 PROCEDURE — 6370000000 HC RX 637 (ALT 250 FOR IP): Performed by: HOSPITALIST

## 2018-12-17 PROCEDURE — 94640 AIRWAY INHALATION TREATMENT: CPT

## 2018-12-17 PROCEDURE — 94760 N-INVAS EAR/PLS OXIMETRY 1: CPT

## 2018-12-17 PROCEDURE — 99225 PR SBSQ OBSERVATION CARE/DAY 25 MINUTES: CPT | Performed by: INTERNAL MEDICINE

## 2018-12-17 PROCEDURE — 70450 CT HEAD/BRAIN W/O DYE: CPT

## 2018-12-17 RX ORDER — MIDODRINE HYDROCHLORIDE 5 MG/1
5 TABLET ORAL EVERY MORNING
Qty: 90 TABLET | Refills: 3 | Status: ON HOLD | OUTPATIENT
Start: 2018-12-18 | End: 2019-01-04

## 2018-12-17 RX ORDER — FLUDROCORTISONE ACETATE 0.1 MG/1
0.1 TABLET ORAL DAILY
Qty: 30 TABLET | Refills: 3 | Status: SHIPPED | OUTPATIENT
Start: 2018-12-18 | End: 2019-01-01 | Stop reason: SDUPTHER

## 2018-12-17 RX ADMIN — TIOTROPIUM BROMIDE 18 MCG: 18 CAPSULE ORAL; RESPIRATORY (INHALATION) at 08:19

## 2018-12-17 RX ADMIN — LEVOTHYROXINE SODIUM 50 MCG: 25 TABLET ORAL at 08:57

## 2018-12-17 RX ADMIN — ACETAMINOPHEN 650 MG: 325 TABLET, FILM COATED ORAL at 07:45

## 2018-12-17 RX ADMIN — FLECAINIDE ACETATE 50 MG: 50 TABLET ORAL at 08:57

## 2018-12-17 RX ADMIN — ACETAMINOPHEN 650 MG: 325 TABLET, FILM COATED ORAL at 12:24

## 2018-12-17 RX ADMIN — LEVETIRACETAM 750 MG: 500 TABLET ORAL at 08:57

## 2018-12-17 RX ADMIN — SERTRALINE 100 MG: 50 TABLET, FILM COATED ORAL at 08:57

## 2018-12-17 RX ADMIN — CYANOCOBALAMIN TAB 1000 MCG 1000 MCG: 1000 TAB at 08:57

## 2018-12-17 RX ADMIN — MIDODRINE HYDROCHLORIDE 5 MG: 5 TABLET ORAL at 08:57

## 2018-12-17 RX ADMIN — Medication 10 ML: at 08:57

## 2018-12-17 RX ADMIN — FLUDROCORTISONE ACETATE 0.1 MG: 0.1 TABLET ORAL at 08:57

## 2018-12-17 ASSESSMENT — PAIN SCALES - GENERAL
PAINLEVEL_OUTOF10: 0
PAINLEVEL_OUTOF10: 0
PAINLEVEL_OUTOF10: 6
PAINLEVEL_OUTOF10: 0
PAINLEVEL_OUTOF10: 2
PAINLEVEL_OUTOF10: 2

## 2018-12-17 ASSESSMENT — PAIN DESCRIPTION - PAIN TYPE: TYPE: ACUTE PAIN

## 2018-12-17 ASSESSMENT — PAIN DESCRIPTION - LOCATION: LOCATION: HEAD

## 2018-12-17 ASSESSMENT — PAIN DESCRIPTION - DESCRIPTORS: DESCRIPTORS: HEADACHE

## 2018-12-17 NOTE — PROGRESS NOTES
Reviewed   [facility]  >1   AC/AP  xarelto   Plan  Last device check 5/18 - 99.7% AS/VS, no tachyarrhythmias  Patient with very little oral fluid intake and mostly coffee  Recommend  1)Better hydration  2)Compression  3)Avoid caffeine  4)Trial of above meds   ~HTN  Today BP [x] Controlled [] Borderline [] Uncontrolled   Counseling [x] Diet/Salt [x] Exercise [x] Weight    Plan Continue current medications at doses detailed above

## 2018-12-17 NOTE — DISCHARGE INSTR - COC
Continuity of Care Form    Patient Name: Mitchell Troncoso   :  1931  MRN:  [id number]    Admit date:  2018  Discharge date: 18    Code Status Order: Full Code   Advance Directives:   [facility] Documentation     Date/Time Healthcare Directive Type of Healthcare Directive Copy in [address] Agent's Name Healthcare Agent's Phone Number    [phone]  Yes, patient has an advance directive for healthcare treatment  Durable power of  for health care  No, copy requested from family  --  bettina daughter  --          Admitting Physician:  Gabino Rivera MD  PCP: Arpan Hamilton MD    Discharging Nurse: [facility] Unit/Room#: 2WI-1027/2449-24  Discharging Unit Phone Number: [phone]    Emergency Contact:   Extended Emergency Contact Information  Primary Emergency Contact: Wm Justice [address] Phone: [phone]  Relation: Child  Secondary Emergency Contact: Jade Putnam [address] Phone: [phone]  Relation: Child    Past Surgical History:  Past Surgical History:   Procedure Laterality Date    COLONOSCOPY  2013    normal except diverticulosis    PACEMAKER PLACEMENT Left     MRI compatible    PACEMAKER PLACEMENT Left     ROTATOR CUFF REPAIR Right     ROLAND AND BSO  2010    cervical cancer, Dr. Villeda Congress  2016    normal       Immunization History:   Immunization History   Administered Date(s) Administered    Influenza, High Dose (Fluzone 65 yrs and older) 2015, 2016, 2017, 2018    Pneumococcal 13-valent Conjugate ([id number]) 2015    Pneumococcal Polysaccharide ([id number]) 2007    Tdap (Boostrix, Adacel) 2016       Active Problems:  Patient Active Problem List   Diagnosis Code    COPD (chronic obstructive pulmonary disease) ([facility] J44.9    DDD (degenerative disc

## 2018-12-17 NOTE — CONSULTS
problems noted above. FAMILY HISTORY:  Negative for premature coronary artery disease. SOCIAL HISTORY:  She lives independently. Her son lives with her. She  has a daughter who lives nearby. She has a very supportive family. She  is  from her . She has never been a cigarette user. She  does drink alcohol. REVIEW OF SYSTEMS:  A 14-system review of systems is notable for the  abdominal bloating, abdominal discomfort. The chest discomfort she had,  but of note, she had coronary arteriogram approximately three months  ago. PHYSICAL EXAMINATION:  GENERAL:  The patient appears younger than her stated age. VITAL SIGNS:  Blood pressure taken by me when lying down is 158/64 on  her right arm, 140/64 on her left arm. When she sits up blood pressure  drops to 130/60, and when she stands up, blood pressure drops to 106/60  and she feels as if she is going to pass out. Pulse rage does not  increase with standing. HEENT:  Sclerae are clear. NECK:  She currently does not have neck vein distension. No carotid  bruits. Posterior pharynx is clear. CHEST:  Lungs are clear. CARDIOVASCULAR:  Cardiac sounds are normal.  ABDOMEN:  Soft, nontender. EXTREMITIES:  Extremities do not show any edema. She has good  peripheral pulses. EKG shows normal sinus rhythm. Labs show troponins to be normal.   Potassium 4.6, creatinine 0.8, hemoglobin 11.6, hematocrit 33.7. IMPRESSION:  Profound orthostatic hypotension and now symptomatic  hypertension, perhaps related to the use of midodrine. RECOMMENDATIONS:  We would only use midodrine in the morning. We would  try Florinef to her regimen. Could also consider Northera treatment as  an alternative to midodrine. Also if she does since she checks her  blood pressure regularly, could use Lopressor 25 mg every 8 hour p.r.n.  systolic blood pressure above 150.         Krishna Caldera MD    D: 12/16/2018 14:56:01       T: 12/17/2018 2:30:16

## 2018-12-17 NOTE — PLAN OF CARE
Problem: Pain:  Goal: Control of chronic pain  Control of chronic pain   Outcome: Ongoing  Tylenol given for chronic leg pain. Will continue to monitor.

## 2018-12-18 ENCOUNTER — TELEPHONE (OUTPATIENT)
Dept: FAMILY MEDICINE CLINIC | Age: 83
End: 2018-12-18

## 2018-12-19 ENCOUNTER — TELEPHONE (OUTPATIENT)
Dept: FAMILY MEDICINE CLINIC | Age: 83
End: 2018-12-19

## 2018-12-19 NOTE — TELEPHONE ENCOUNTER
Brooke Cox from [facility] says they have      Patient for home care wants to make sure    Doctor will sign order when faxed over    Please advise

## 2018-12-21 ENCOUNTER — OFFICE VISIT (OUTPATIENT)
Dept: FAMILY MEDICINE CLINIC | Age: 83
End: 2018-12-21
Payer: MEDICARE

## 2018-12-21 VITALS
BODY MASS INDEX: 21.16 KG/M2 | DIASTOLIC BLOOD PRESSURE: 72 MMHG | HEART RATE: 73 BPM | SYSTOLIC BLOOD PRESSURE: 124 MMHG | WEIGHT: 112 LBS | OXYGEN SATURATION: 96 %

## 2018-12-21 DIAGNOSIS — F03.90 DEMENTIA WITHOUT BEHAVIORAL DISTURBANCE, UNSPECIFIED DEMENTIA TYPE: ICD-10-CM

## 2018-12-21 DIAGNOSIS — I95.1 ORTHOSTATIC HYPOTENSION: Primary | ICD-10-CM

## 2018-12-21 PROCEDURE — 99213 OFFICE O/P EST LOW 20 MIN: CPT | Performed by: FAMILY MEDICINE

## 2018-12-21 RX ORDER — DONEPEZIL HYDROCHLORIDE 10 MG/1
10 TABLET, FILM COATED ORAL NIGHTLY
Qty: 30 TABLET | Refills: 5 | Status: SHIPPED | OUTPATIENT
Start: 2018-12-21 | End: 2019-01-11 | Stop reason: SDUPTHER

## 2018-12-21 RX ORDER — DONEPEZIL HYDROCHLORIDE 5 MG/1
5 TABLET, FILM COATED ORAL NIGHTLY
Qty: 30 TABLET | Refills: 0 | Status: SHIPPED | OUTPATIENT
Start: 2018-12-21 | End: 2019-01-11

## 2018-12-25 NOTE — DISCHARGE SUMMARY
SINUSES: The visualized paranasal sinuses and mastoid air cells demonstrate no acute abnormality. SOFT TISSUES/SKULL: No acute abnormality of the visualized skull or soft tissues. No acute intracranial hemorrhage. No acute large vessel infarct. Findings were discussed with DR PINON at 8:33 pm on 12/13/2018. Xr Chest Portable    Result Date: 12/13/2018  EXAMINATION: SINGLE XRAY VIEW OF THE CHEST 12/13/2018 7:05 pm COMPARISON: Radiograph 08/05/2018 HISTORY: ORDERING SYSTEM PROVIDED HISTORY: hypertension TECHNOLOGIST PROVIDED HISTORY: Reason for exam:->hypertension Ordering Physician Provided Reason for Exam: Hypertension (Hypertension at PCP office today, 180/? - pt symptomatic with headache, states it feels much better now than earlier today 3/10) Acuity: Unknown Type of Exam: Unknown FINDINGS: Left subclavian pacer wires are unchanged. No pneumothorax or effusion. No focal consolidation. No acute osseous abnormality. No acute cardiopulmonary disease. Cta Neck W Contrast    Addendum Date: 12/17/2018    ADDENDUM: 3D reconstructed images were performed on a separate workstation and provided for review. Result Date: 12/17/2018  EXAMINATION: CTA OF THE HEAD WITH CONTRAST; CTA OF THE NECK 12/13/2018 8:16 pm: TECHNIQUE: CTA of the head/brain was performed with the administration of intravenous contrast. Multiplanar reformatted images are provided for review. MIP images are provided for review. Dose modulation, iterative reconstruction, and/or weight based adjustment of the mA/kV was utilized to reduce the radiation dose to as low as reasonably achievable.; CTA of the neck was performed with the administration of intravenous contrast. Multiplanar reformatted images are provided for review. MIP images are provided for review. Stenosis of the internal carotid arteries measured using NASCET criteria.  Dose modulation, iterative reconstruction, and/or weight based adjustment of the mA/kV was utilized to reduce the radiation dose to as low as reasonably achievable. COMPARISON: None. HISTORY: ORDERING SYSTEM PROVIDED HISTORY: left facial droop TECHNOLOGIST PROVIDED HISTORY: Has a \"code stroke\" or \"stroke alert\" been called? ->Yes Ordering Physician Provided Reason for Exam: left facial droop Acuity: Unknown Type of Exam: Unknown FINDINGS: CTA NECK: AORTIC ARCH/ARCH VESSELS: There is a normal branch pattern of the aortic arch. No significant stenosis is seen of the innominate artery or subclavian arteries. CAROTID ARTERIES: The common carotid arteries are normal in appearance without evidence of a flow limiting stenosis. The internal carotid arteries are normal in appearance without evidence of a flow limiting stenosis by NASCET criteria. No dissection or arterial injury is seen. VERTEBRAL ARTERIES: The vertebral arteries both arise from the subclavian arteries and are normal in caliber without evidence of flow limiting stenosis or dissection. SOFT TISSUES: There is interlobular and intralobular septal thickening within the lung apices with calcified lymph nodes present within the mediastinum. There is biapical pleural-parenchymal thickening and there is a 7.9 mm nodule within the left lung apex, present on a previous chest CT from August 5, 2018 upon retrospect. BONES: Degenerative changes of the spine are present with mild anterolisthesis of C3 on C4 and C4 on C5, likely due to facet arthropathy. CTA HEAD: ANTERIOR CIRCULATION: The internal carotid arteries are normal in course and caliber without focal stenosis. The anterior cerebral and middle cerebral arteries demonstrate no focal stenosis. There are bilateral posterior communicating arteries. POSTERIOR CIRCULATION: The posterior cerebral arteries demonstrate no focal stenosis. The vertebral and basilar arteries appear unremarkable. BRAIN: See separately dictated noncontrast head CT report.      No flow-limiting stenosis or branch occlusion detected within the low as reasonably achievable. COMPARISON: None. HISTORY: ORDERING SYSTEM PROVIDED HISTORY: left facial droop TECHNOLOGIST PROVIDED HISTORY: Has a \"code stroke\" or \"stroke alert\" been called? ->Yes Ordering Physician Provided Reason for Exam: left facial droop Acuity: Unknown Type of Exam: Unknown FINDINGS: CTA NECK: AORTIC ARCH/ARCH VESSELS: There is a normal branch pattern of the aortic arch. No significant stenosis is seen of the innominate artery or subclavian arteries. CAROTID ARTERIES: The common carotid arteries are normal in appearance without evidence of a flow limiting stenosis. The internal carotid arteries are normal in appearance without evidence of a flow limiting stenosis by NASCET criteria. No dissection or arterial injury is seen. VERTEBRAL ARTERIES: The vertebral arteries both arise from the subclavian arteries and are normal in caliber without evidence of flow limiting stenosis or dissection. SOFT TISSUES: There is interlobular and intralobular septal thickening within the lung apices with calcified lymph nodes present within the mediastinum. There is biapical pleural-parenchymal thickening and there is a 7.9 mm nodule within the left lung apex, present on a previous chest CT from August 5, 2018 upon retrospect. BONES: Degenerative changes of the spine are present with mild anterolisthesis of C3 on C4 and C4 on C5, likely due to facet arthropathy. CTA HEAD: ANTERIOR CIRCULATION: The internal carotid arteries are normal in course and caliber without focal stenosis. The anterior cerebral and middle cerebral arteries demonstrate no focal stenosis. There are bilateral posterior communicating arteries. POSTERIOR CIRCULATION: The posterior cerebral arteries demonstrate no focal stenosis. The vertebral and basilar arteries appear unremarkable. BRAIN: See separately dictated noncontrast head CT report. No flow-limiting stenosis or branch occlusion detected within the head or neck.  Incidentally noted for the opportunity to be involved in this patients care.

## 2019-01-01 ENCOUNTER — TELEPHONE (OUTPATIENT)
Dept: CARDIOLOGY CLINIC | Age: 84
End: 2019-01-01

## 2019-01-01 ENCOUNTER — HOSPITAL ENCOUNTER (EMERGENCY)
Age: 84
Discharge: HOME | DRG: 312 | End: 2019-03-30
Attending: EMERGENCY MEDICINE
Payer: MEDICARE

## 2019-01-01 ENCOUNTER — OFFICE VISIT (OUTPATIENT)
Dept: ENT CLINIC | Age: 84
End: 2019-01-01
Payer: MEDICARE

## 2019-01-01 ENCOUNTER — TELEPHONE (OUTPATIENT)
Dept: FAMILY MEDICINE CLINIC | Age: 84
End: 2019-01-01

## 2019-01-01 ENCOUNTER — CARE COORDINATION (OUTPATIENT)
Dept: CASE MANAGEMENT | Age: 84
End: 2019-01-01

## 2019-01-01 ENCOUNTER — APPOINTMENT (OUTPATIENT)
Dept: CT IMAGING | Age: 84
DRG: 073 | End: 2019-01-01
Payer: MEDICARE

## 2019-01-01 ENCOUNTER — HOSPITAL ENCOUNTER (INPATIENT)
Age: 84
LOS: 3 days | Discharge: HOME HEALTH | DRG: 074 | End: 2019-07-12
Attending: EMERGENCY MEDICINE | Admitting: INTERNAL MEDICINE
Payer: MEDICARE

## 2019-01-01 ENCOUNTER — OFFICE VISIT (OUTPATIENT)
Dept: FAMILY MEDICINE CLINIC | Age: 84
End: 2019-01-01
Payer: MEDICARE

## 2019-01-01 ENCOUNTER — HOSPITAL ENCOUNTER (OUTPATIENT)
Age: 84
Discharge: HOME | End: 2019-06-26
Payer: MEDICARE

## 2019-01-01 ENCOUNTER — HOSPITAL ENCOUNTER (INPATIENT)
Age: 84
LOS: 3 days | Discharge: HOME HEALTH | DRG: 312 | End: 2019-04-02
Attending: EMERGENCY MEDICINE | Admitting: INTERNAL MEDICINE
Payer: MEDICARE

## 2019-01-01 ENCOUNTER — HOSPITAL ENCOUNTER (EMERGENCY)
Age: 84
Discharge: HOME | End: 2019-03-08
Attending: EMERGENCY MEDICINE
Payer: MEDICARE

## 2019-01-01 ENCOUNTER — OFFICE VISIT (OUTPATIENT)
Dept: CARDIOLOGY CLINIC | Age: 84
End: 2019-01-01
Payer: MEDICARE

## 2019-01-01 ENCOUNTER — APPOINTMENT (OUTPATIENT)
Dept: GENERAL RADIOLOGY | Age: 84
End: 2019-01-01
Payer: MEDICARE

## 2019-01-01 ENCOUNTER — HOSPITAL ENCOUNTER (EMERGENCY)
Age: 84
Discharge: HOME | End: 2019-02-10
Attending: EMERGENCY MEDICINE
Payer: MEDICARE

## 2019-01-01 ENCOUNTER — APPOINTMENT (OUTPATIENT)
Dept: GENERAL RADIOLOGY | Age: 84
DRG: 074 | End: 2019-01-01
Payer: MEDICARE

## 2019-01-01 ENCOUNTER — APPOINTMENT (OUTPATIENT)
Dept: GENERAL RADIOLOGY | Age: 84
DRG: 073 | End: 2019-01-01
Payer: MEDICARE

## 2019-01-01 ENCOUNTER — HOSPITAL ENCOUNTER (EMERGENCY)
Age: 84
Discharge: HOME | End: 2019-07-15
Attending: EMERGENCY MEDICINE
Payer: MEDICARE

## 2019-01-01 ENCOUNTER — TELEPHONE (OUTPATIENT)
Dept: RHEUMATOLOGY | Age: 84
End: 2019-01-01

## 2019-01-01 ENCOUNTER — APPOINTMENT (OUTPATIENT)
Dept: GENERAL RADIOLOGY | Age: 84
DRG: 312 | End: 2019-01-01
Payer: MEDICARE

## 2019-01-01 ENCOUNTER — HOSPITAL ENCOUNTER (INPATIENT)
Age: 84
LOS: 1 days | Discharge: HOME HEALTH | DRG: 073 | End: 2019-08-16
Attending: EMERGENCY MEDICINE | Admitting: INTERNAL MEDICINE
Payer: MEDICARE

## 2019-01-01 ENCOUNTER — HOSPITAL ENCOUNTER (OUTPATIENT)
Age: 84
Setting detail: SPECIMEN
Discharge: HOME | End: 2019-08-09
Payer: MEDICARE

## 2019-01-01 ENCOUNTER — APPOINTMENT (OUTPATIENT)
Dept: CT IMAGING | Age: 84
End: 2019-01-01
Payer: MEDICARE

## 2019-01-01 ENCOUNTER — HOSPITAL ENCOUNTER (OUTPATIENT)
Age: 84
Setting detail: SPECIMEN
Discharge: HOME | End: 2019-07-22
Payer: MEDICARE

## 2019-01-01 ENCOUNTER — PROCEDURE VISIT (OUTPATIENT)
Dept: CARDIOLOGY CLINIC | Age: 84
End: 2019-01-01
Payer: MEDICARE

## 2019-01-01 ENCOUNTER — HOSPITAL ENCOUNTER (OUTPATIENT)
Dept: GENERAL RADIOLOGY | Age: 84
Discharge: HOME | End: 2019-06-26
Payer: MEDICARE

## 2019-01-01 ENCOUNTER — NURSE TRIAGE (OUTPATIENT)
Dept: OTHER | Facility: CLINIC | Age: 84
End: 2019-01-01

## 2019-01-01 ENCOUNTER — OFFICE VISIT (OUTPATIENT)
Dept: NEUROLOGY | Age: 84
End: 2019-01-01
Payer: MEDICARE

## 2019-01-01 VITALS
WEIGHT: 109 LBS | BODY MASS INDEX: 20.58 KG/M2 | HEART RATE: 61 BPM | HEIGHT: 61 IN | SYSTOLIC BLOOD PRESSURE: 166 MMHG | DIASTOLIC BLOOD PRESSURE: 82 MMHG

## 2019-01-01 VITALS
DIASTOLIC BLOOD PRESSURE: 88 MMHG | OXYGEN SATURATION: 95 % | BODY MASS INDEX: 20.41 KG/M2 | SYSTOLIC BLOOD PRESSURE: 156 MMHG | HEART RATE: 76 BPM | WEIGHT: 108 LBS

## 2019-01-01 VITALS
WEIGHT: 106 LBS | HEIGHT: 61 IN | BODY MASS INDEX: 20.01 KG/M2 | HEART RATE: 76 BPM | DIASTOLIC BLOOD PRESSURE: 74 MMHG | SYSTOLIC BLOOD PRESSURE: 116 MMHG

## 2019-01-01 VITALS
SYSTOLIC BLOOD PRESSURE: 113 MMHG | RESPIRATION RATE: 17 BRPM | DIASTOLIC BLOOD PRESSURE: 66 MMHG | TEMPERATURE: 97.5 F | HEART RATE: 60 BPM | HEIGHT: 61 IN | WEIGHT: 109.6 LBS | BODY MASS INDEX: 20.69 KG/M2 | OXYGEN SATURATION: 95 %

## 2019-01-01 VITALS
DIASTOLIC BLOOD PRESSURE: 90 MMHG | WEIGHT: 108 LBS | BODY MASS INDEX: 20.41 KG/M2 | OXYGEN SATURATION: 96 % | SYSTOLIC BLOOD PRESSURE: 184 MMHG | HEART RATE: 60 BPM

## 2019-01-01 VITALS
WEIGHT: 108 LBS | RESPIRATION RATE: 19 BRPM | SYSTOLIC BLOOD PRESSURE: 181 MMHG | HEIGHT: 60 IN | BODY MASS INDEX: 21.2 KG/M2 | DIASTOLIC BLOOD PRESSURE: 91 MMHG | HEART RATE: 76 BPM | TEMPERATURE: 97.7 F | OXYGEN SATURATION: 97 %

## 2019-01-01 VITALS
DIASTOLIC BLOOD PRESSURE: 85 MMHG | RESPIRATION RATE: 16 BRPM | BODY MASS INDEX: 20.97 KG/M2 | WEIGHT: 111 LBS | DIASTOLIC BLOOD PRESSURE: 80 MMHG | HEART RATE: 76 BPM | OXYGEN SATURATION: 98 % | SYSTOLIC BLOOD PRESSURE: 140 MMHG | HEART RATE: 75 BPM | HEIGHT: 60 IN | SYSTOLIC BLOOD PRESSURE: 165 MMHG | BODY MASS INDEX: 20.65 KG/M2 | WEIGHT: 105.16 LBS | OXYGEN SATURATION: 97 % | TEMPERATURE: 97.1 F

## 2019-01-01 VITALS
SYSTOLIC BLOOD PRESSURE: 184 MMHG | OXYGEN SATURATION: 97 % | BODY MASS INDEX: 21.14 KG/M2 | HEART RATE: 60 BPM | TEMPERATURE: 97.6 F | DIASTOLIC BLOOD PRESSURE: 79 MMHG | HEIGHT: 61 IN | RESPIRATION RATE: 12 BRPM | WEIGHT: 112 LBS

## 2019-01-01 VITALS
SYSTOLIC BLOOD PRESSURE: 139 MMHG | BODY MASS INDEX: 20.39 KG/M2 | DIASTOLIC BLOOD PRESSURE: 88 MMHG | WEIGHT: 108 LBS | HEART RATE: 72 BPM | HEIGHT: 61 IN

## 2019-01-01 VITALS
BODY MASS INDEX: 20.24 KG/M2 | DIASTOLIC BLOOD PRESSURE: 88 MMHG | OXYGEN SATURATION: 98 % | SYSTOLIC BLOOD PRESSURE: 136 MMHG | HEART RATE: 72 BPM | HEIGHT: 62 IN | WEIGHT: 110 LBS

## 2019-01-01 VITALS
HEART RATE: 70 BPM | RESPIRATION RATE: 16 BRPM | SYSTOLIC BLOOD PRESSURE: 138 MMHG | HEIGHT: 61 IN | DIASTOLIC BLOOD PRESSURE: 77 MMHG | WEIGHT: 110 LBS | BODY MASS INDEX: 20.77 KG/M2

## 2019-01-01 VITALS
SYSTOLIC BLOOD PRESSURE: 80 MMHG | WEIGHT: 109.2 LBS | HEIGHT: 61 IN | HEART RATE: 76 BPM | BODY MASS INDEX: 20.62 KG/M2 | OXYGEN SATURATION: 96 % | DIASTOLIC BLOOD PRESSURE: 60 MMHG

## 2019-01-01 VITALS
DIASTOLIC BLOOD PRESSURE: 74 MMHG | OXYGEN SATURATION: 96 % | RESPIRATION RATE: 14 BRPM | HEART RATE: 75 BPM | SYSTOLIC BLOOD PRESSURE: 128 MMHG | BODY MASS INDEX: 20.39 KG/M2 | HEIGHT: 61 IN | WEIGHT: 108 LBS

## 2019-01-01 VITALS
RESPIRATION RATE: 18 BRPM | OXYGEN SATURATION: 95 % | DIASTOLIC BLOOD PRESSURE: 78 MMHG | TEMPERATURE: 97.7 F | WEIGHT: 116 LBS | SYSTOLIC BLOOD PRESSURE: 136 MMHG | HEART RATE: 85 BPM | HEIGHT: 61 IN | BODY MASS INDEX: 21.9 KG/M2

## 2019-01-01 VITALS
HEART RATE: 68 BPM | HEIGHT: 61 IN | SYSTOLIC BLOOD PRESSURE: 134 MMHG | TEMPERATURE: 97.8 F | WEIGHT: 107 LBS | DIASTOLIC BLOOD PRESSURE: 64 MMHG | OXYGEN SATURATION: 95 % | BODY MASS INDEX: 20.2 KG/M2 | RESPIRATION RATE: 16 BRPM

## 2019-01-01 VITALS
DIASTOLIC BLOOD PRESSURE: 96 MMHG | HEART RATE: 60 BPM | TEMPERATURE: 98.1 F | OXYGEN SATURATION: 98 % | WEIGHT: 115 LBS | BODY MASS INDEX: 21.71 KG/M2 | SYSTOLIC BLOOD PRESSURE: 158 MMHG

## 2019-01-01 VITALS
OXYGEN SATURATION: 99 % | TEMPERATURE: 98.4 F | SYSTOLIC BLOOD PRESSURE: 183 MMHG | DIASTOLIC BLOOD PRESSURE: 75 MMHG | RESPIRATION RATE: 16 BRPM | HEART RATE: 68 BPM | BODY MASS INDEX: 20.96 KG/M2 | WEIGHT: 111 LBS | HEIGHT: 61 IN

## 2019-01-01 VITALS
BODY MASS INDEX: 20.75 KG/M2 | DIASTOLIC BLOOD PRESSURE: 68 MMHG | SYSTOLIC BLOOD PRESSURE: 112 MMHG | WEIGHT: 109.8 LBS | TEMPERATURE: 98.7 F | OXYGEN SATURATION: 95 % | HEART RATE: 92 BPM

## 2019-01-01 VITALS
BODY MASS INDEX: 20.29 KG/M2 | OXYGEN SATURATION: 94 % | DIASTOLIC BLOOD PRESSURE: 70 MMHG | RESPIRATION RATE: 12 BRPM | HEART RATE: 75 BPM | SYSTOLIC BLOOD PRESSURE: 102 MMHG | WEIGHT: 107.38 LBS

## 2019-01-01 DIAGNOSIS — R30.0 DYSURIA: ICD-10-CM

## 2019-01-01 DIAGNOSIS — E87.6 HYPOKALEMIA: ICD-10-CM

## 2019-01-01 DIAGNOSIS — R55 SYNCOPE AND COLLAPSE: Primary | ICD-10-CM

## 2019-01-01 DIAGNOSIS — R35.0 URINARY FREQUENCY: Primary | ICD-10-CM

## 2019-01-01 DIAGNOSIS — M25.551 RIGHT HIP PAIN: Primary | ICD-10-CM

## 2019-01-01 DIAGNOSIS — I95.1 ORTHOSTATIC HYPOTENSION: Primary | ICD-10-CM

## 2019-01-01 DIAGNOSIS — G47.33 OBSTRUCTIVE SLEEP APNEA: ICD-10-CM

## 2019-01-01 DIAGNOSIS — E87.6 DECREASED POTASSIUM IN THE BLOOD: Primary | ICD-10-CM

## 2019-01-01 DIAGNOSIS — F32.A DEPRESSION, UNSPECIFIED DEPRESSION TYPE: ICD-10-CM

## 2019-01-01 DIAGNOSIS — I25.10 CORONARY ARTERY DISEASE INVOLVING NATIVE CORONARY ARTERY OF NATIVE HEART WITHOUT ANGINA PECTORIS: Primary | ICD-10-CM

## 2019-01-01 DIAGNOSIS — F33.0 MILD EPISODE OF RECURRENT MAJOR DEPRESSIVE DISORDER (HCC): ICD-10-CM

## 2019-01-01 DIAGNOSIS — F02.80 LATE ONSET ALZHEIMER'S DISEASE WITHOUT BEHAVIORAL DISTURBANCE (HCC): ICD-10-CM

## 2019-01-01 DIAGNOSIS — Z95.0 PACEMAKER: ICD-10-CM

## 2019-01-01 DIAGNOSIS — R62.7 FAILURE TO THRIVE IN ADULT: ICD-10-CM

## 2019-01-01 DIAGNOSIS — F02.818 LATE ONSET ALZHEIMER'S DISEASE WITH BEHAVIORAL DISTURBANCE (HCC): ICD-10-CM

## 2019-01-01 DIAGNOSIS — R05.9 COUGH: Primary | ICD-10-CM

## 2019-01-01 DIAGNOSIS — N39.42 URINARY INCONTINENCE WITHOUT SENSORY AWARENESS: ICD-10-CM

## 2019-01-01 DIAGNOSIS — I15.9 SECONDARY HYPERTENSION: Primary | ICD-10-CM

## 2019-01-01 DIAGNOSIS — Z91.81 AT HIGH RISK FOR FALLS: ICD-10-CM

## 2019-01-01 DIAGNOSIS — R00.1 BRADYCARDIA: ICD-10-CM

## 2019-01-01 DIAGNOSIS — R55 SYNCOPE, UNSPECIFIED SYNCOPE TYPE: Primary | ICD-10-CM

## 2019-01-01 DIAGNOSIS — R19.7 DIARRHEA, UNSPECIFIED TYPE: Primary | ICD-10-CM

## 2019-01-01 DIAGNOSIS — I48.0 PAROXYSMAL ATRIAL FIBRILLATION (HCC): Primary | ICD-10-CM

## 2019-01-01 DIAGNOSIS — I48.0 PAROXYSMAL ATRIAL FIBRILLATION (HCC): ICD-10-CM

## 2019-01-01 DIAGNOSIS — E53.8 B12 DEFICIENCY: ICD-10-CM

## 2019-01-01 DIAGNOSIS — I10 ESSENTIAL HYPERTENSION: ICD-10-CM

## 2019-01-01 DIAGNOSIS — R55 SYNCOPE AND COLLAPSE: ICD-10-CM

## 2019-01-01 DIAGNOSIS — R51.9 HEADACHE, UNSPECIFIED HEADACHE TYPE: ICD-10-CM

## 2019-01-01 DIAGNOSIS — H93.13 SUBJECTIVE TINNITUS OF BOTH EARS: ICD-10-CM

## 2019-01-01 DIAGNOSIS — G30.1 LATE ONSET ALZHEIMER'S DISEASE WITH BEHAVIORAL DISTURBANCE (HCC): ICD-10-CM

## 2019-01-01 DIAGNOSIS — I10 ESSENTIAL HYPERTENSION: Primary | ICD-10-CM

## 2019-01-01 DIAGNOSIS — G30.1 LATE ONSET ALZHEIMER'S DISEASE WITHOUT BEHAVIORAL DISTURBANCE (HCC): ICD-10-CM

## 2019-01-01 DIAGNOSIS — R00.1 BRADYCARDIA: Primary | ICD-10-CM

## 2019-01-01 DIAGNOSIS — R42 DIZZINESS: ICD-10-CM

## 2019-01-01 DIAGNOSIS — R63.4 WEIGHT LOSS: ICD-10-CM

## 2019-01-01 DIAGNOSIS — G40.209 PARTIAL SYMPTOMATIC EPILEPSY WITH COMPLEX PARTIAL SEIZURES, NOT INTRACTABLE, WITHOUT STATUS EPILEPTICUS (HCC): Primary | ICD-10-CM

## 2019-01-01 DIAGNOSIS — R53.83 FATIGUE, UNSPECIFIED TYPE: ICD-10-CM

## 2019-01-01 DIAGNOSIS — R30.0 DYSURIA: Primary | ICD-10-CM

## 2019-01-01 DIAGNOSIS — I10 HYPERTENSION, UNSPECIFIED TYPE: ICD-10-CM

## 2019-01-01 DIAGNOSIS — I95.1 ORTHOSTATIC HYPOTENSION: ICD-10-CM

## 2019-01-01 DIAGNOSIS — I10 HYPERTENSION, UNSPECIFIED TYPE: Primary | ICD-10-CM

## 2019-01-01 DIAGNOSIS — R42 DIZZINESS: Primary | ICD-10-CM

## 2019-01-01 DIAGNOSIS — I49.5 SINUS NODE DYSFUNCTION (HCC): ICD-10-CM

## 2019-01-01 DIAGNOSIS — D50.8 OTHER IRON DEFICIENCY ANEMIA: ICD-10-CM

## 2019-01-01 DIAGNOSIS — H91.93 BILATERAL HEARING LOSS, UNSPECIFIED HEARING LOSS TYPE: ICD-10-CM

## 2019-01-01 DIAGNOSIS — H93.13 TINNITUS OF BOTH EARS: ICD-10-CM

## 2019-01-01 DIAGNOSIS — R53.82 CHRONIC FATIGUE: ICD-10-CM

## 2019-01-01 DIAGNOSIS — M25.551 RIGHT HIP PAIN: ICD-10-CM

## 2019-01-01 DIAGNOSIS — M10.9 ACUTE GOUT INVOLVING TOE, UNSPECIFIED CAUSE, UNSPECIFIED LATERALITY: Primary | ICD-10-CM

## 2019-01-01 LAB
A/G RATIO: 1.2 (ref 1.1–2.2)
A/G RATIO: 1.4 (ref 1.1–2.2)
A/G RATIO: 1.5 (ref 1.1–2.2)
ALBUMIN SERPL-MCNC: 3.9 G/DL (ref 3.4–5)
ALBUMIN SERPL-MCNC: 4 G/DL (ref 3.4–5)
ALBUMIN SERPL-MCNC: 4.1 G/DL (ref 3.4–5)
ALBUMIN SERPL-MCNC: 4.7 G/DL (ref 3.4–5)
ALP BLD-CCNC: 45 U/L (ref 40–129)
ALP BLD-CCNC: 48 U/L (ref 40–129)
ALP BLD-CCNC: 48 U/L (ref 40–129)
ALP BLD-CCNC: 49 U/L (ref 40–129)
ALP BLD-CCNC: 51 U/L (ref 40–129)
ALP BLD-CCNC: 51 U/L (ref 40–129)
ALT SERPL-CCNC: 13 U/L (ref 10–40)
ALT SERPL-CCNC: 15 U/L (ref 10–40)
ALT SERPL-CCNC: 17 U/L (ref 10–40)
ALT SERPL-CCNC: 18 U/L (ref 10–40)
ALT SERPL-CCNC: 23 U/L (ref 10–40)
ALT SERPL-CCNC: 23 U/L (ref 10–40)
AMMONIA: 23 UMOL/L (ref 11–51)
ANION GAP SERPL CALCULATED.3IONS-SCNC: 11 MMOL/L (ref 3–16)
ANION GAP SERPL CALCULATED.3IONS-SCNC: 12 MMOL/L (ref 3–16)
ANION GAP SERPL CALCULATED.3IONS-SCNC: 13 MMOL/L (ref 3–16)
ANION GAP SERPL CALCULATED.3IONS-SCNC: 14 MMOL/L (ref 3–16)
ANION GAP SERPL CALCULATED.3IONS-SCNC: 15 MMOL/L (ref 3–16)
ANION GAP SERPL CALCULATED.3IONS-SCNC: 15 MMOL/L (ref 3–16)
ANION GAP SERPL CALCULATED.3IONS-SCNC: 16 MMOL/L (ref 3–16)
ANION GAP SERPL CALCULATED.3IONS-SCNC: 20 MMOL/L (ref 6–18)
AST SERPL-CCNC: 20 U/L (ref 15–37)
AST SERPL-CCNC: 24 U/L (ref 15–37)
AST SERPL-CCNC: 25 U/L (ref 15–37)
AST SERPL-CCNC: 25 U/L (ref 15–37)
AST SERPL-CCNC: 26 U/L (ref 15–37)
AST SERPL-CCNC: 26 U/L (ref 15–37)
BACTERIA URINE, POC: 0
BACTERIA URINE, POC: 0
BACTERIA URINE, POC: NORMAL
BACTERIA URINE, POC: NORMAL
BANDED NEUTROPHILS RELATIVE PERCENT: 7 % (ref 0–7)
BASE EXCESS ARTERIAL: -3 MMOL/L (ref -3–3)
BASOPHILS ABSOLUTE: 0 K/UL (ref 0–0.2)
BASOPHILS ABSOLUTE: 0.1 K/UL (ref 0–0.2)
BASOPHILS RELATIVE PERCENT: 0.4 %
BASOPHILS RELATIVE PERCENT: 0.5 %
BASOPHILS RELATIVE PERCENT: 0.5 %
BASOPHILS RELATIVE PERCENT: 0.6 %
BASOPHILS RELATIVE PERCENT: 0.7 %
BASOPHILS RELATIVE PERCENT: 0.7 %
BASOPHILS RELATIVE PERCENT: 1 %
BASOPHILS RELATIVE PERCENT: 1 %
BASOPHILS RELATIVE PERCENT: 1.1 %
BASOPHILS RELATIVE PERCENT: 1.1 %
BILIRUB SERPL-MCNC: 0.3 MG/DL (ref 0–1)
BILIRUB SERPL-MCNC: 0.4 MG/DL (ref 0–1)
BILIRUB SERPL-MCNC: 0.7 MG/DL (ref 0–1)
BILIRUB SERPL-MCNC: <0.2 MG/DL (ref 0–1)
BILIRUBIN DIRECT: <0.2 MG/DL (ref 0–0.3)
BILIRUBIN URINE: 0 MG/DL
BILIRUBIN URINE: 1 MG/DL
BILIRUBIN URINE: ABNORMAL
BILIRUBIN URINE: NEGATIVE
BILIRUBIN, INDIRECT: NORMAL MG/DL (ref 0–1)
BLOOD, URINE: NEGATIVE
BLOOD, URINE: POSITIVE
BUN BLDV-MCNC: 11 MG/DL (ref 7–20)
BUN BLDV-MCNC: 12 MG/DL (ref 7–20)
BUN BLDV-MCNC: 13 MG/DL (ref 7–20)
BUN BLDV-MCNC: 14 MG/DL (ref 7–20)
BUN BLDV-MCNC: 15 MG/DL (ref 7–20)
BUN BLDV-MCNC: 16 MG/DL (ref 7–20)
BUN BLDV-MCNC: 17 MG/DL (ref 7–20)
BUN BLDV-MCNC: 17 MG/DL (ref 8–26)
BUN BLDV-MCNC: 18 MG/DL (ref 7–20)
BUN BLDV-MCNC: 19 MG/DL (ref 7–20)
BUN BLDV-MCNC: 19 MG/DL (ref 7–20)
BUN BLDV-MCNC: 22 MG/DL (ref 7–20)
BUN BLDV-MCNC: 24 MG/DL (ref 7–20)
CALCIUM SERPL-MCNC: 10 MG/DL (ref 8.3–10.6)
CALCIUM SERPL-MCNC: 8.7 MG/DL (ref 8.3–10.6)
CALCIUM SERPL-MCNC: 8.9 MG/DL (ref 8.3–10.6)
CALCIUM SERPL-MCNC: 9 MG/DL (ref 8.3–10.6)
CALCIUM SERPL-MCNC: 9 MG/DL (ref 8.3–10.6)
CALCIUM SERPL-MCNC: 9.1 MG/DL (ref 8.3–10.6)
CALCIUM SERPL-MCNC: 9.2 MG/DL (ref 8.3–10.6)
CALCIUM SERPL-MCNC: 9.3 MG/DL (ref 8.3–10.6)
CALCIUM SERPL-MCNC: 9.4 MG/DL (ref 8.3–10.6)
CALCIUM SERPL-MCNC: 9.5 MG/DL (ref 8.3–10.6)
CALCIUM SERPL-MCNC: 9.5 MG/DL (ref 8.3–10.6)
CALCIUM SERPL-MCNC: 9.7 MG/DL (ref 8.3–10.6)
CALCIUM SERPL-MCNC: 9.9 MG/DL (ref 8.8–10.1)
CARBOXYHEMOGLOBIN ARTERIAL: 0.7 % (ref 0–1.5)
CASTS URINE, POC: 0
CHLORIDE BLD-SCNC: 100 MMOL/L (ref 99–110)
CHLORIDE BLD-SCNC: 101 MMOL/L (ref 99–110)
CHLORIDE BLD-SCNC: 102 MMOL/L (ref 99–110)
CHLORIDE BLD-SCNC: 102 MMOL/L (ref 99–110)
CHLORIDE BLD-SCNC: 103 MMOL/L (ref 99–110)
CHLORIDE BLD-SCNC: 104 MEQ/L (ref 101–111)
CHLORIDE BLD-SCNC: 104 MMOL/L (ref 99–110)
CHLORIDE BLD-SCNC: 104 MMOL/L (ref 99–110)
CHLORIDE BLD-SCNC: 106 MMOL/L (ref 99–110)
CHLORIDE BLD-SCNC: 106 MMOL/L (ref 99–110)
CHLORIDE BLD-SCNC: 107 MMOL/L (ref 99–110)
CHLORIDE BLD-SCNC: 107 MMOL/L (ref 99–110)
CHLORIDE BLD-SCNC: 108 MMOL/L (ref 99–110)
CHLORIDE BLD-SCNC: 110 MMOL/L (ref 99–110)
CHLORIDE BLD-SCNC: 96 MMOL/L (ref 99–110)
CLARITY: CLEAR
CO2: 19 MMOL/L (ref 21–32)
CO2: 19 MMOL/L (ref 21–32)
CO2: 21 MMOL/L (ref 21–32)
CO2: 21 MMOL/L (ref 21–32)
CO2: 22 MMOL/L (ref 21–32)
CO2: 23 MMOL/L (ref 21–32)
CO2: 23 MMOL/L (ref 22–29)
CO2: 24 MMOL/L (ref 21–32)
CO2: 24 MMOL/L (ref 21–32)
CO2: 25 MMOL/L (ref 21–32)
CO2: 25 MMOL/L (ref 21–32)
CO2: 26 MMOL/L (ref 21–32)
CO2: 27 MMOL/L (ref 21–32)
CO2: 30 MMOL/L (ref 21–32)
COLOR: NORMAL
COLOR: YELLOW
COMMENT UA: ABNORMAL
CREAT SERPL-MCNC: 0.5 MG/DL (ref 0.6–1.2)
CREAT SERPL-MCNC: 0.5 MG/DL (ref 0.6–1.2)
CREAT SERPL-MCNC: 0.6 MG/DL (ref 0.6–1.2)
CREAT SERPL-MCNC: 0.69 MG/DL (ref 0.44–1.03)
CREAT SERPL-MCNC: 0.7 MG/DL (ref 0.6–1.2)
CREAT SERPL-MCNC: 0.8 MG/DL (ref 0.6–1.2)
CREAT SERPL-MCNC: 0.9 MG/DL (ref 0.6–1.2)
CREAT SERPL-MCNC: <0.5 MG/DL (ref 0.6–1.2)
CREATININE URINE: 143.4 MG/DL (ref 28–259)
CRYSTALS URINE, POC: 0
EKG ATRIAL RATE: 60 BPM
EKG ATRIAL RATE: 65 BPM
EKG ATRIAL RATE: 68 BPM
EKG ATRIAL RATE: 71 BPM
EKG ATRIAL RATE: 77 BPM
EKG ATRIAL RATE: 78 BPM
EKG DIAGNOSIS: NORMAL
EKG P AXIS: 39 DEGREES
EKG P AXIS: 50 DEGREES
EKG P AXIS: 54 DEGREES
EKG P AXIS: 54 DEGREES
EKG P AXIS: 65 DEGREES
EKG P-R INTERVAL: 178 MS
EKG P-R INTERVAL: 178 MS
EKG P-R INTERVAL: 206 MS
EKG P-R INTERVAL: 206 MS
EKG P-R INTERVAL: 212 MS
EKG P-R INTERVAL: 228 MS
EKG Q-T INTERVAL: 390 MS
EKG Q-T INTERVAL: 412 MS
EKG Q-T INTERVAL: 424 MS
EKG Q-T INTERVAL: 430 MS
EKG Q-T INTERVAL: 456 MS
EKG Q-T INTERVAL: 650 MS
EKG QRS DURATION: 82 MS
EKG QRS DURATION: 84 MS
EKG QRS DURATION: 84 MS
EKG QRS DURATION: 90 MS
EKG QRS DURATION: 90 MS
EKG QRS DURATION: 94 MS
EKG QTC CALCULATION (BAZETT): 423 MS
EKG QTC CALCULATION (BAZETT): 424 MS
EKG QTC CALCULATION (BAZETT): 438 MS
EKG QTC CALCULATION (BAZETT): 474 MS
EKG QTC CALCULATION (BAZETT): 490 MS
EKG QTC CALCULATION (BAZETT): 735 MS
EKG R AXIS: -11 DEGREES
EKG R AXIS: 0 DEGREES
EKG R AXIS: 19 DEGREES
EKG R AXIS: 29 DEGREES
EKG R AXIS: 3 DEGREES
EKG R AXIS: 7 DEGREES
EKG T AXIS: -79 DEGREES
EKG T AXIS: 127 DEGREES
EKG T AXIS: 31 DEGREES
EKG T AXIS: 43 DEGREES
EKG T AXIS: 43 DEGREES
EKG T AXIS: 58 DEGREES
EKG VENTRICULAR RATE: 60 BPM
EKG VENTRICULAR RATE: 65 BPM
EKG VENTRICULAR RATE: 68 BPM
EKG VENTRICULAR RATE: 71 BPM
EKG VENTRICULAR RATE: 77 BPM
EKG VENTRICULAR RATE: 78 BPM
EOSINOPHILS ABSOLUTE: 0 K/UL (ref 0–0.6)
EOSINOPHILS ABSOLUTE: 0.1 K/UL (ref 0–0.6)
EOSINOPHILS RELATIVE PERCENT: 0 %
EOSINOPHILS RELATIVE PERCENT: 0.4 %
EOSINOPHILS RELATIVE PERCENT: 0.5 %
EOSINOPHILS RELATIVE PERCENT: 0.6 %
EOSINOPHILS RELATIVE PERCENT: 0.6 %
EOSINOPHILS RELATIVE PERCENT: 1 %
EOSINOPHILS RELATIVE PERCENT: 1.1 %
EOSINOPHILS RELATIVE PERCENT: 1.1 %
EPI CELLS URINE, POC: 0
EPI CELLS URINE, POC: NORMAL
EPITHELIAL CELLS, UA: 0 /HPF (ref 0–5)
EPITHELIAL CELLS, UA: 1 /HPF (ref 0–5)
EPITHELIAL CELLS, UA: 1 /HPF (ref 0–5)
EPITHELIAL CELLS, UA: 2 /HPF (ref 0–5)
ESTIMATED AVERAGE GLUCOSE: 102.5 MG/DL
GFR AFRICAN AMERICAN: 88 ML/MIN/1.73 M2
GFR AFRICAN AMERICAN: >60
GFR NON-AFRICAN AMERICAN: 59
GFR NON-AFRICAN AMERICAN: 76 ML/MIN/1.73 M2
GFR NON-AFRICAN AMERICAN: >60
GLOBULIN: 2.8 G/DL
GLOBULIN: 2.8 G/DL
GLOBULIN: 2.9 G/DL
GLOBULIN: 3.1 G/DL
GLOBULIN: 3.3 G/DL
GLUCOSE BLD-MCNC: 103 MG/DL (ref 70–99)
GLUCOSE BLD-MCNC: 106 MG/DL (ref 70–99)
GLUCOSE BLD-MCNC: 110 MG/DL (ref 70–99)
GLUCOSE BLD-MCNC: 110 MG/DL (ref 70–99)
GLUCOSE BLD-MCNC: 112 MG/DL (ref 70–99)
GLUCOSE BLD-MCNC: 113 MG/DL (ref 70–99)
GLUCOSE BLD-MCNC: 115 MG/DL (ref 70–99)
GLUCOSE BLD-MCNC: 117 MG/DL (ref 70–99)
GLUCOSE BLD-MCNC: 118 MG/DL (ref 70–99)
GLUCOSE BLD-MCNC: 122 MG/DL (ref 70–99)
GLUCOSE BLD-MCNC: 135 MG/DL (ref 70–99)
GLUCOSE BLD-MCNC: 139 MG/DL (ref 70–99)
GLUCOSE BLD-MCNC: 77 MG/DL (ref 70–99)
GLUCOSE BLD-MCNC: 91 MG/DL (ref 70–99)
GLUCOSE BLD-MCNC: 97 MG/DL (ref 70–99)
GLUCOSE BLD-MCNC: 97 MG/DL (ref 70–99)
GLUCOSE BLD-MCNC: 99 MG/DL (ref 70–99)
GLUCOSE URINE: NEGATIVE
GLUCOSE URINE: NEGATIVE MG/DL
HBA1C MFR BLD: 5.2 %
HCO3 ARTERIAL: 20.5 MMOL/L (ref 21–29)
HCT VFR BLD CALC: 33.6 % (ref 36–48)
HCT VFR BLD CALC: 34.2 % (ref 36–48)
HCT VFR BLD CALC: 34.3 % (ref 36–48)
HCT VFR BLD CALC: 36.4 % (ref 36–48)
HCT VFR BLD CALC: 37.2 % (ref 36–48)
HCT VFR BLD CALC: 38.3 % (ref 36–48)
HCT VFR BLD CALC: 38.8 % (ref 36–48)
HCT VFR BLD CALC: 38.8 % (ref 36–48)
HCT VFR BLD CALC: 39.5 % (ref 36–48)
HCT VFR BLD CALC: 41.3 % (ref 36–48)
HCT VFR BLD CALC: 41.3 % (ref 36–48)
HCT VFR BLD CALC: 65 % (ref 36–46)
HEMOGLOBIN, ART, EXTENDED: 13.2 G/DL (ref 12–16)
HEMOGLOBIN: 11.4 G/DL (ref 12–16)
HEMOGLOBIN: 11.8 G/DL (ref 12–16)
HEMOGLOBIN: 11.8 G/DL (ref 12–16)
HEMOGLOBIN: 12.7 G/DL (ref 12–16)
HEMOGLOBIN: 12.8 G/DL (ref 12–16)
HEMOGLOBIN: 13 G/DL (ref 12–16)
HEMOGLOBIN: 13.1 G/DL (ref 12–16)
HEMOGLOBIN: 13.1 G/DL (ref 12–16)
HEMOGLOBIN: 13.3 G/DL (ref 12–16)
HEMOGLOBIN: 13.7 G/DL (ref 12–16)
HEMOGLOBIN: 13.9 G/DL (ref 12–16)
HYALINE CASTS: 0 /LPF (ref 0–8)
HYALINE CASTS: 1 /LPF (ref 0–8)
HYALINE CASTS: 2 /LPF (ref 0–8)
HYALINE CASTS: 7 /LPF (ref 0–8)
INR BLD: 1.07 (ref 0.86–1.14)
INR BLD: 1.96 (ref 0.86–1.14)
KEPPRA DOSE AMT: NORMAL
KEPPRA: 25.6 UG/ML (ref 6–46)
KETONES, URINE: 15 MG/DL
KETONES, URINE: ABNORMAL MG/DL
KETONES, URINE: NEGATIVE
KETONES, URINE: NEGATIVE MG/DL
KETONES, URINE: NEGATIVE MG/DL
LEUKOCYTE EST, POC: ABNORMAL
LEUKOCYTE EST, POC: NEGATIVE
LEUKOCYTE EST, POC: NORMAL
LEUKOCYTE EST, POC: NORMAL
LEUKOCYTE ESTERASE, URINE: ABNORMAL
LEUKOCYTE ESTERASE, URINE: ABNORMAL
LEUKOCYTE ESTERASE, URINE: NEGATIVE
LEUKOCYTE ESTERASE, URINE: NEGATIVE
LYMPHOCYTES ABSOLUTE: 0.3 K/UL (ref 1–5.1)
LYMPHOCYTES ABSOLUTE: 1.2 K/UL (ref 1–5.1)
LYMPHOCYTES ABSOLUTE: 1.5 K/UL (ref 1–5.1)
LYMPHOCYTES ABSOLUTE: 1.6 K/UL (ref 1–5.1)
LYMPHOCYTES ABSOLUTE: 1.6 K/UL (ref 1–5.1)
LYMPHOCYTES ABSOLUTE: 1.7 K/UL (ref 1–5.1)
LYMPHOCYTES ABSOLUTE: 2 K/UL (ref 1–5.1)
LYMPHOCYTES ABSOLUTE: 2.1 K/UL (ref 1–5.1)
LYMPHOCYTES RELATIVE PERCENT: 11.5 %
LYMPHOCYTES RELATIVE PERCENT: 17.5 %
LYMPHOCYTES RELATIVE PERCENT: 23.1 %
LYMPHOCYTES RELATIVE PERCENT: 26 %
LYMPHOCYTES RELATIVE PERCENT: 29.4 %
LYMPHOCYTES RELATIVE PERCENT: 34.1 %
LYMPHOCYTES RELATIVE PERCENT: 34.7 %
LYMPHOCYTES RELATIVE PERCENT: 39.9 %
LYMPHOCYTES RELATIVE PERCENT: 44.2 %
LYMPHOCYTES RELATIVE PERCENT: 5 %
MAGNESIUM: 1.8 MG/DL (ref 1.8–2.4)
MAGNESIUM: 1.9 MG/DL (ref 1.8–2.4)
MAGNESIUM: 2 MG/DL (ref 1.8–2.4)
MAGNESIUM: 2.2 MG/DL (ref 1.8–2.4)
MCH RBC QN AUTO: 31.5 PG (ref 26–34)
MCH RBC QN AUTO: 31.6 PG (ref 26–34)
MCH RBC QN AUTO: 31.7 PG (ref 26–34)
MCH RBC QN AUTO: 31.8 PG (ref 26–34)
MCH RBC QN AUTO: 32.1 PG (ref 26–34)
MCH RBC QN AUTO: 32.3 PG (ref 26–34)
MCH RBC QN AUTO: 32.5 PG (ref 26–34)
MCH RBC QN AUTO: 32.6 PG (ref 26–34)
MCH RBC QN AUTO: 32.7 PG (ref 26–34)
MCH RBC QN AUTO: 32.9 PG (ref 26–34)
MCH RBC QN AUTO: 33 PG (ref 26–34)
MCHC RBC AUTO-ENTMCNC: 33 G/DL (ref 31–36)
MCHC RBC AUTO-ENTMCNC: 33 G/DL (ref 31–36)
MCHC RBC AUTO-ENTMCNC: 33.1 G/DL (ref 31–36)
MCHC RBC AUTO-ENTMCNC: 33.6 G/DL (ref 31–36)
MCHC RBC AUTO-ENTMCNC: 33.7 G/DL (ref 31–36)
MCHC RBC AUTO-ENTMCNC: 34 G/DL (ref 31–36)
MCHC RBC AUTO-ENTMCNC: 34.2 G/DL (ref 31–36)
MCHC RBC AUTO-ENTMCNC: 34.4 G/DL (ref 31–36)
MCHC RBC AUTO-ENTMCNC: 34.4 G/DL (ref 31–36)
MCHC RBC AUTO-ENTMCNC: 35.1 G/DL (ref 31–36)
MCHC RBC AUTO-ENTMCNC: 35.2 G/DL (ref 31–36)
MCV RBC AUTO: 92 FL (ref 80–100)
MCV RBC AUTO: 92.6 FL (ref 80–100)
MCV RBC AUTO: 92.9 FL (ref 80–100)
MCV RBC AUTO: 93.3 FL (ref 80–100)
MCV RBC AUTO: 93.9 FL (ref 80–100)
MCV RBC AUTO: 93.9 FL (ref 80–100)
MCV RBC AUTO: 95.1 FL (ref 80–100)
MCV RBC AUTO: 95.5 FL (ref 80–100)
MCV RBC AUTO: 97.4 FL (ref 80–100)
MCV RBC AUTO: 98.3 FL (ref 80–100)
MCV RBC AUTO: 98.7 FL (ref 80–100)
METHEMOGLOBIN ARTERIAL: 0.5 %
MICROSCOPIC EXAMINATION: YES
MONOCYTES ABSOLUTE: 0.1 K/UL (ref 0–1.3)
MONOCYTES ABSOLUTE: 0.4 K/UL (ref 0–1.3)
MONOCYTES ABSOLUTE: 0.5 K/UL (ref 0–1.3)
MONOCYTES ABSOLUTE: 0.5 K/UL (ref 0–1.3)
MONOCYTES ABSOLUTE: 0.6 K/UL (ref 0–1.3)
MONOCYTES RELATIVE PERCENT: 2 %
MONOCYTES RELATIVE PERCENT: 4.5 %
MONOCYTES RELATIVE PERCENT: 6.1 %
MONOCYTES RELATIVE PERCENT: 6.2 %
MONOCYTES RELATIVE PERCENT: 7.6 %
MONOCYTES RELATIVE PERCENT: 7.7 %
MONOCYTES RELATIVE PERCENT: 8 %
MONOCYTES RELATIVE PERCENT: 8.1 %
MONOCYTES RELATIVE PERCENT: 8.3 %
MONOCYTES RELATIVE PERCENT: 9.5 %
NEUTROPHILS ABSOLUTE: 2 K/UL (ref 1.7–7.7)
NEUTROPHILS ABSOLUTE: 2.5 K/UL (ref 1.7–7.7)
NEUTROPHILS ABSOLUTE: 2.6 K/UL (ref 1.7–7.7)
NEUTROPHILS ABSOLUTE: 2.9 K/UL (ref 1.7–7.7)
NEUTROPHILS ABSOLUTE: 3 K/UL (ref 1.7–7.7)
NEUTROPHILS ABSOLUTE: 3.7 K/UL (ref 1.7–7.7)
NEUTROPHILS ABSOLUTE: 4.5 K/UL (ref 1.7–7.7)
NEUTROPHILS ABSOLUTE: 5.8 K/UL (ref 1.7–7.7)
NEUTROPHILS ABSOLUTE: 6.8 K/UL (ref 1.7–7.7)
NEUTROPHILS ABSOLUTE: 8.9 K/UL (ref 1.7–7.7)
NEUTROPHILS RELATIVE PERCENT: 44.1 %
NEUTROPHILS RELATIVE PERCENT: 50.5 %
NEUTROPHILS RELATIVE PERCENT: 55.3 %
NEUTROPHILS RELATIVE PERCENT: 57.1 %
NEUTROPHILS RELATIVE PERCENT: 60.7 %
NEUTROPHILS RELATIVE PERCENT: 64.9 %
NEUTROPHILS RELATIVE PERCENT: 69.4 %
NEUTROPHILS RELATIVE PERCENT: 74.8 %
NEUTROPHILS RELATIVE PERCENT: 82.9 %
NEUTROPHILS RELATIVE PERCENT: 85 %
NITRITE, URINE: NEGATIVE
O2 CONTENT ARTERIAL: 18 ML/DL
O2 SAT, ARTERIAL: 98.3 %
O2 THERAPY: ABNORMAL
ORGANISM: ABNORMAL
ORGANISM: ABNORMAL
PCO2 ARTERIAL: 31.6 MMHG (ref 35–45)
PDW BLD-RTO: 12 % (ref 12.4–15.4)
PDW BLD-RTO: 12.3 % (ref 12.4–15.4)
PDW BLD-RTO: 12.4 % (ref 12.4–15.4)
PDW BLD-RTO: 12.9 % (ref 12.4–15.4)
PDW BLD-RTO: 13 % (ref 12.4–15.4)
PDW BLD-RTO: 13 % (ref 12.4–15.4)
PDW BLD-RTO: 13.1 % (ref 12.4–15.4)
PDW BLD-RTO: 13.4 % (ref 12.4–15.4)
PH ARTERIAL: 7.42 (ref 7.35–7.45)
PH UA: 5.5 (ref 4.5–8)
PH UA: 6 (ref 4.5–8)
PH UA: 6 (ref 4.5–8)
PH UA: 6 (ref 5–8)
PH UA: 6.5 (ref 4.5–8)
PH UA: 6.5 (ref 5–8)
PLATELET # BLD: 210 K/UL (ref 135–450)
PLATELET # BLD: 214 K/UL (ref 135–450)
PLATELET # BLD: 222 K/UL (ref 135–450)
PLATELET # BLD: 227 K/UL (ref 135–450)
PLATELET # BLD: 234 K/UL (ref 135–450)
PLATELET # BLD: 239 K/UL (ref 135–450)
PLATELET # BLD: 241 K/UL (ref 135–450)
PLATELET # BLD: 250 K/UL (ref 135–450)
PLATELET # BLD: 257 K/UL (ref 135–450)
PLATELET # BLD: 267 K/UL (ref 135–450)
PLATELET # BLD: 314 K/UL (ref 135–450)
PLATELET SLIDE REVIEW: ADEQUATE
PMV BLD AUTO: 7.4 FL (ref 5–10.5)
PMV BLD AUTO: 7.5 FL (ref 5–10.5)
PMV BLD AUTO: 7.6 FL (ref 5–10.5)
PMV BLD AUTO: 7.7 FL (ref 5–10.5)
PMV BLD AUTO: 7.8 FL (ref 5–10.5)
PO2 ARTERIAL: 98.4 MMHG (ref 75–108)
POTASSIUM REFLEX MAGNESIUM: 2.1 MMOL/L (ref 3.5–5.1)
POTASSIUM REFLEX MAGNESIUM: 2.4 MMOL/L (ref 3.5–5.1)
POTASSIUM REFLEX MAGNESIUM: 2.7 MMOL/L (ref 3.5–5.1)
POTASSIUM REFLEX MAGNESIUM: 3.2 MMOL/L (ref 3.5–5.1)
POTASSIUM REFLEX MAGNESIUM: 3.3 MMOL/L (ref 3.5–5.1)
POTASSIUM REFLEX MAGNESIUM: 3.5 MMOL/L (ref 3.5–5.1)
POTASSIUM REFLEX MAGNESIUM: 3.6 MMOL/L (ref 3.5–5.1)
POTASSIUM REFLEX MAGNESIUM: 4 MMOL/L (ref 3.5–5.1)
POTASSIUM REFLEX MAGNESIUM: 4.7 MMOL/L (ref 3.5–5.1)
POTASSIUM REFLEX MAGNESIUM: 5.2 MMOL/L (ref 3.5–5.1)
POTASSIUM SERPL-SCNC: 2.5 MMOL/L (ref 3.5–5.1)
POTASSIUM SERPL-SCNC: 2.8 MMOL/L (ref 3.5–5.1)
POTASSIUM SERPL-SCNC: 2.9 MMOL/L (ref 3.5–5.1)
POTASSIUM SERPL-SCNC: 2.9 MMOL/L (ref 3.5–5.1)
POTASSIUM SERPL-SCNC: 3.1 MMOL/L (ref 3.5–5.1)
POTASSIUM SERPL-SCNC: 3.1 MMOL/L (ref 3.5–5.1)
POTASSIUM SERPL-SCNC: 3.4 MMOL/L (ref 3.5–5.1)
POTASSIUM SERPL-SCNC: 3.5 MMOL/L (ref 3.5–5.1)
POTASSIUM SERPL-SCNC: 3.6 MEQ/L (ref 3.6–5.1)
POTASSIUM SERPL-SCNC: 3.6 MMOL/L (ref 3.5–5.1)
POTASSIUM SERPL-SCNC: 3.7 MMOL/L (ref 3.5–5.1)
POTASSIUM SERPL-SCNC: 3.9 MMOL/L (ref 3.5–5.1)
POTASSIUM, UR: 89.4 MMOL/L
PRO-BNP: 338 PG/ML (ref 0–449)
PROTEIN UA: 30 MG/DL
PROTEIN UA: ABNORMAL MG/DL
PROTEIN UA: NEGATIVE
PROTEIN UA: NEGATIVE MG/DL
PROTEIN UA: NEGATIVE MG/DL
PROTEIN UA: POSITIVE
PROTHROMBIN TIME: 12.2 SEC (ref 9.8–13)
PROTHROMBIN TIME: 22.4 SEC (ref 9.8–13)
RAPID INFLUENZA  B AGN: NEGATIVE
RAPID INFLUENZA A AGN: NEGATIVE
RBC # BLD: 3.63 M/UL (ref 4–5.2)
RBC # BLD: 3.65 M/UL (ref 4–5.2)
RBC # BLD: 3.72 M/UL (ref 4–5.2)
RBC # BLD: 3.88 M/UL (ref 4–5.2)
RBC # BLD: 3.98 M/UL (ref 4–5.2)
RBC # BLD: 4 M/UL (ref 4–5.2)
RBC # BLD: 4.02 M/UL (ref 4–5.2)
RBC # BLD: 4.08 M/UL (ref 4–5.2)
RBC # BLD: 4.18 M/UL (ref 4–5.2)
RBC # BLD: 4.19 M/UL (ref 4–5.2)
RBC # BLD: 4.23 M/UL (ref 4–5.2)
RBC # BLD: NORMAL 10*6/UL
RBC UA: 1 /HPF (ref 0–4)
RBC UA: 2 /HPF (ref 0–4)
RBC UA: 4 /HPF (ref 0–4)
RBC UA: 8 /HPF (ref 0–4)
RBC URINE, POC: 0
RBC URINE, POC: 0
RBC URINE, POC: ABNORMAL
RBC URINE, POC: NORMAL
REASON FOR REJECTION: NORMAL
REJECTED TEST: NORMAL
SLIDE REVIEW: ABNORMAL
SODIUM BLD-SCNC: 137 MMOL/L (ref 136–145)
SODIUM BLD-SCNC: 137 MMOL/L (ref 136–145)
SODIUM BLD-SCNC: 139 MMOL/L (ref 136–145)
SODIUM BLD-SCNC: 140 MMOL/L (ref 136–145)
SODIUM BLD-SCNC: 141 MMOL/L (ref 136–145)
SODIUM BLD-SCNC: 141 MMOL/L (ref 136–145)
SODIUM BLD-SCNC: 142 MMOL/L (ref 136–145)
SODIUM BLD-SCNC: 143 MEQ/L (ref 135–145)
SODIUM BLD-SCNC: 143 MMOL/L (ref 136–145)
SODIUM BLD-SCNC: 144 MMOL/L (ref 136–145)
SODIUM BLD-SCNC: 147 MMOL/L (ref 136–145)
SPECIFIC GRAVITY UA: 1.01 (ref 1–1.03)
SPECIFIC GRAVITY UA: 1.02 (ref 1–1.03)
SPECIFIC GRAVITY UA: 1.03 (ref 1–1.03)
SPECIFIC GRAVITY UA: >1.03 (ref 1–1.03)
TCO2 ARTERIAL: 48.1 MMOL/L
TOTAL PROTEIN: 6.7 G/DL (ref 6.4–8.2)
TOTAL PROTEIN: 6.8 G/DL (ref 6.4–8.2)
TOTAL PROTEIN: 6.8 G/DL (ref 6.4–8.2)
TOTAL PROTEIN: 7 G/DL (ref 6.4–8.2)
TOTAL PROTEIN: 7.3 G/DL (ref 6.4–8.2)
TOTAL PROTEIN: 7.8 G/DL (ref 6.4–8.2)
TROPONIN: <0.01 NG/ML
TSH REFLEX: 1.78 UIU/ML (ref 0.27–4.2)
URINE CULTURE, ROUTINE: ABNORMAL
URINE CULTURE, ROUTINE: NORMAL
URINE REFLEX TO CULTURE: ABNORMAL
URINE REFLEX TO CULTURE: ABNORMAL
URINE REFLEX TO CULTURE: YES
URINE REFLEX TO CULTURE: YES
URINE TYPE: ABNORMAL
UROBILINOGEN, URINE: 0.2 E.U./DL
UROBILINOGEN, URINE: 1 E.U./DL
UROBILINOGEN, URINE: NORMAL
WBC # BLD: 10.8 K/UL (ref 4–11)
WBC # BLD: 4.5 K/UL (ref 4–11)
WBC # BLD: 4.6 K/UL (ref 4–11)
WBC # BLD: 5 K/UL (ref 4–11)
WBC # BLD: 5 K/UL (ref 4–11)
WBC # BLD: 5.1 K/UL (ref 4–11)
WBC # BLD: 5.2 K/UL (ref 4–11)
WBC # BLD: 5.7 K/UL (ref 4–11)
WBC # BLD: 6.3 K/UL (ref 4–11)
WBC # BLD: 6.5 K/UL (ref 4–11)
WBC # BLD: 9.1 K/UL (ref 4–11)
WBC UA: 0 /HPF (ref 0–5)
WBC UA: 1 /HPF (ref 0–5)
WBC UA: 2 /HPF (ref 0–5)
WBC UA: 5 /HPF (ref 0–5)
WBC URINE, POC: 0
WBC URINE, POC: 0
WBC URINE, POC: NORMAL
WBC URINE, POC: NORMAL
YEAST URINE, POC: 0

## 2019-01-01 PROCEDURE — 93280 PM DEVICE PROGR EVAL DUAL: CPT | Performed by: INTERNAL MEDICINE

## 2019-01-01 PROCEDURE — 92526 ORAL FUNCTION THERAPY: CPT

## 2019-01-01 PROCEDURE — 71045 X-RAY EXAM CHEST 1 VIEW: CPT

## 2019-01-01 PROCEDURE — 6370000000 HC RX 637 (ALT 250 FOR IP): Performed by: PHYSICIAN ASSISTANT

## 2019-01-01 PROCEDURE — 93010 ELECTROCARDIOGRAM REPORT: CPT | Performed by: INTERNAL MEDICINE

## 2019-01-01 PROCEDURE — 80048 BASIC METABOLIC PNL TOTAL CA: CPT

## 2019-01-01 PROCEDURE — 99285 EMERGENCY DEPT VISIT HI MDM: CPT

## 2019-01-01 PROCEDURE — 1200000000 HC SEMI PRIVATE

## 2019-01-01 PROCEDURE — 96374 THER/PROPH/DIAG INJ IV PUSH: CPT

## 2019-01-01 PROCEDURE — 2580000003 HC RX 258: Performed by: PHYSICIAN ASSISTANT

## 2019-01-01 PROCEDURE — 6370000000 HC RX 637 (ALT 250 FOR IP): Performed by: INTERNAL MEDICINE

## 2019-01-01 PROCEDURE — 2580000003 HC RX 258: Performed by: INTERNAL MEDICINE

## 2019-01-01 PROCEDURE — 81001 URINALYSIS AUTO W/SCOPE: CPT

## 2019-01-01 PROCEDURE — 2060000000 HC ICU INTERMEDIATE R&B

## 2019-01-01 PROCEDURE — 97166 OT EVAL MOD COMPLEX 45 MIN: CPT

## 2019-01-01 PROCEDURE — 92610 EVALUATE SWALLOWING FUNCTION: CPT

## 2019-01-01 PROCEDURE — 99233 SBSQ HOSP IP/OBS HIGH 50: CPT | Performed by: NURSE PRACTITIONER

## 2019-01-01 PROCEDURE — 93005 ELECTROCARDIOGRAM TRACING: CPT | Performed by: EMERGENCY MEDICINE

## 2019-01-01 PROCEDURE — 36415 COLL VENOUS BLD VENIPUNCTURE: CPT

## 2019-01-01 PROCEDURE — 6370000000 HC RX 637 (ALT 250 FOR IP): Performed by: NURSE PRACTITIONER

## 2019-01-01 PROCEDURE — 6360000002 HC RX W HCPCS: Performed by: INTERNAL MEDICINE

## 2019-01-01 PROCEDURE — 82803 BLOOD GASES ANY COMBINATION: CPT

## 2019-01-01 PROCEDURE — 99223 1ST HOSP IP/OBS HIGH 75: CPT | Performed by: PSYCHIATRY & NEUROLOGY

## 2019-01-01 PROCEDURE — 80053 COMPREHEN METABOLIC PANEL: CPT

## 2019-01-01 PROCEDURE — 83036 HEMOGLOBIN GLYCOSYLATED A1C: CPT

## 2019-01-01 PROCEDURE — 96366 THER/PROPH/DIAG IV INF ADDON: CPT

## 2019-01-01 PROCEDURE — 85025 COMPLETE CBC W/AUTO DIFF WBC: CPT

## 2019-01-01 PROCEDURE — G0378 HOSPITAL OBSERVATION PER HR: HCPCS

## 2019-01-01 PROCEDURE — 93000 ELECTROCARDIOGRAM COMPLETE: CPT | Performed by: INTERNAL MEDICINE

## 2019-01-01 PROCEDURE — 99214 OFFICE O/P EST MOD 30 MIN: CPT | Performed by: INTERNAL MEDICINE

## 2019-01-01 PROCEDURE — 96365 THER/PROPH/DIAG IV INF INIT: CPT

## 2019-01-01 PROCEDURE — 6370000000 HC RX 637 (ALT 250 FOR IP): Performed by: HOSPITALIST

## 2019-01-01 PROCEDURE — 85610 PROTHROMBIN TIME: CPT

## 2019-01-01 PROCEDURE — 82570 ASSAY OF URINE CREATININE: CPT

## 2019-01-01 PROCEDURE — 4B02XSZ MEASUREMENT OF CARDIAC PACEMAKER, EXTERNAL APPROACH: ICD-10-PCS | Performed by: INTERNAL MEDICINE

## 2019-01-01 PROCEDURE — 85014 HEMATOCRIT: CPT | Performed by: INTERNAL MEDICINE

## 2019-01-01 PROCEDURE — 93000 ELECTROCARDIOGRAM COMPLETE: CPT | Performed by: NURSE PRACTITIONER

## 2019-01-01 PROCEDURE — 84132 ASSAY OF SERUM POTASSIUM: CPT

## 2019-01-01 PROCEDURE — 97116 GAIT TRAINING THERAPY: CPT

## 2019-01-01 PROCEDURE — 84484 ASSAY OF TROPONIN QUANT: CPT

## 2019-01-01 PROCEDURE — 94760 N-INVAS EAR/PLS OXIMETRY 1: CPT

## 2019-01-01 PROCEDURE — 83735 ASSAY OF MAGNESIUM: CPT

## 2019-01-01 PROCEDURE — 2500000003 HC RX 250 WO HCPCS: Performed by: INTERNAL MEDICINE

## 2019-01-01 PROCEDURE — 99214 OFFICE O/P EST MOD 30 MIN: CPT | Performed by: FAMILY MEDICINE

## 2019-01-01 PROCEDURE — 99214 OFFICE O/P EST MOD 30 MIN: CPT | Performed by: OTOLARYNGOLOGY

## 2019-01-01 PROCEDURE — 81000 URINALYSIS NONAUTO W/SCOPE: CPT | Performed by: FAMILY MEDICINE

## 2019-01-01 PROCEDURE — 97162 PT EVAL MOD COMPLEX 30 MIN: CPT

## 2019-01-01 PROCEDURE — 94150 VITAL CAPACITY TEST: CPT

## 2019-01-01 PROCEDURE — 99232 SBSQ HOSP IP/OBS MODERATE 35: CPT | Performed by: INTERNAL MEDICINE

## 2019-01-01 PROCEDURE — 82140 ASSAY OF AMMONIA: CPT

## 2019-01-01 PROCEDURE — 99284 EMERGENCY DEPT VISIT MOD MDM: CPT

## 2019-01-01 PROCEDURE — 84443 ASSAY THYROID STIM HORMONE: CPT

## 2019-01-01 PROCEDURE — 81000 URINALYSIS NONAUTO W/SCOPE: CPT | Performed by: INTERNAL MEDICINE

## 2019-01-01 PROCEDURE — 70450 CT HEAD/BRAIN W/O DYE: CPT

## 2019-01-01 PROCEDURE — 97535 SELF CARE MNGMENT TRAINING: CPT

## 2019-01-01 PROCEDURE — 71046 X-RAY EXAM CHEST 2 VIEWS: CPT

## 2019-01-01 PROCEDURE — 87804 INFLUENZA ASSAY W/OPTIC: CPT

## 2019-01-01 PROCEDURE — 96376 TX/PRO/DX INJ SAME DRUG ADON: CPT

## 2019-01-01 PROCEDURE — 96372 THER/PROPH/DIAG INJ SC/IM: CPT

## 2019-01-01 PROCEDURE — 96375 TX/PRO/DX INJ NEW DRUG ADDON: CPT

## 2019-01-01 PROCEDURE — 94640 AIRWAY INHALATION TREATMENT: CPT

## 2019-01-01 PROCEDURE — 97165 OT EVAL LOW COMPLEX 30 MIN: CPT

## 2019-01-01 PROCEDURE — 6360000002 HC RX W HCPCS: Performed by: HOSPITALIST

## 2019-01-01 PROCEDURE — 74230 X-RAY XM SWLNG FUNCJ C+: CPT

## 2019-01-01 PROCEDURE — 92611 MOTION FLUOROSCOPY/SWALLOW: CPT

## 2019-01-01 PROCEDURE — 87086 URINE CULTURE/COLONY COUNT: CPT

## 2019-01-01 PROCEDURE — 99212 OFFICE O/P EST SF 10 MIN: CPT | Performed by: NURSE PRACTITIONER

## 2019-01-01 PROCEDURE — 6360000002 HC RX W HCPCS: Performed by: EMERGENCY MEDICINE

## 2019-01-01 PROCEDURE — 85027 COMPLETE CBC AUTOMATED: CPT

## 2019-01-01 PROCEDURE — 99213 OFFICE O/P EST LOW 20 MIN: CPT | Performed by: FAMILY MEDICINE

## 2019-01-01 PROCEDURE — 97530 THERAPEUTIC ACTIVITIES: CPT

## 2019-01-01 PROCEDURE — 99495 TRANSJ CARE MGMT MOD F2F 14D: CPT | Performed by: FAMILY MEDICINE

## 2019-01-01 PROCEDURE — 74220 X-RAY XM ESOPHAGUS 1CNTRST: CPT

## 2019-01-01 PROCEDURE — 93005 ELECTROCARDIOGRAM TRACING: CPT | Performed by: PHYSICIAN ASSISTANT

## 2019-01-01 PROCEDURE — 99223 1ST HOSP IP/OBS HIGH 75: CPT | Performed by: INTERNAL MEDICINE

## 2019-01-01 PROCEDURE — 6370000000 HC RX 637 (ALT 250 FOR IP): Performed by: EMERGENCY MEDICINE

## 2019-01-01 PROCEDURE — 94761 N-INVAS EAR/PLS OXIMETRY MLT: CPT

## 2019-01-01 PROCEDURE — 2580000003 HC RX 258

## 2019-01-01 PROCEDURE — 80177 DRUG SCRN QUAN LEVETIRACETAM: CPT

## 2019-01-01 PROCEDURE — 80076 HEPATIC FUNCTION PANEL: CPT

## 2019-01-01 PROCEDURE — 84133 ASSAY OF URINE POTASSIUM: CPT

## 2019-01-01 PROCEDURE — 99222 1ST HOSP IP/OBS MODERATE 55: CPT | Performed by: INTERNAL MEDICINE

## 2019-01-01 PROCEDURE — 99214 OFFICE O/P EST MOD 30 MIN: CPT | Performed by: NURSE PRACTITIONER

## 2019-01-01 PROCEDURE — 1111F DSCHRG MED/CURRENT MED MERGE: CPT | Performed by: FAMILY MEDICINE

## 2019-01-01 PROCEDURE — 83880 ASSAY OF NATRIURETIC PEPTIDE: CPT

## 2019-01-01 PROCEDURE — 6360000002 HC RX W HCPCS: Performed by: PHYSICIAN ASSISTANT

## 2019-01-01 PROCEDURE — 99233 SBSQ HOSP IP/OBS HIGH 50: CPT | Performed by: INTERNAL MEDICINE

## 2019-01-01 PROCEDURE — 73502 X-RAY EXAM HIP UNI 2-3 VIEWS: CPT

## 2019-01-01 PROCEDURE — 2580000003 HC RX 258: Performed by: EMERGENCY MEDICINE

## 2019-01-01 PROCEDURE — 96361 HYDRATE IV INFUSION ADD-ON: CPT

## 2019-01-01 PROCEDURE — 36600 WITHDRAWAL OF ARTERIAL BLOOD: CPT

## 2019-01-01 PROCEDURE — 99214 OFFICE O/P EST MOD 30 MIN: CPT | Performed by: PSYCHIATRY & NEUROLOGY

## 2019-01-01 RX ORDER — POTASSIUM CHLORIDE 750 MG/1
10 TABLET, EXTENDED RELEASE ORAL DAILY
Qty: 30 TABLET | Refills: 0 | Status: ON HOLD | OUTPATIENT
Start: 2019-01-01 | End: 2019-01-01 | Stop reason: HOSPADM

## 2019-01-01 RX ORDER — NITROGLYCERIN 2.5 MG/D
1 PATCH TRANSDERMAL PRN
Qty: 30 PATCH | Refills: 3 | Status: SHIPPED | OUTPATIENT
Start: 2019-01-01 | End: 2019-01-01 | Stop reason: CLARIF

## 2019-01-01 RX ORDER — MECLIZINE HCL 12.5 MG/1
25 TABLET ORAL 3 TIMES DAILY PRN
Status: CANCELLED | OUTPATIENT
Start: 2019-01-01

## 2019-01-01 RX ORDER — POTASSIUM CHLORIDE 20 MEQ/1
40 TABLET, EXTENDED RELEASE ORAL ONCE
Status: DISCONTINUED | OUTPATIENT
Start: 2019-01-01 | End: 2019-01-01

## 2019-01-01 RX ORDER — POTASSIUM CHLORIDE 750 MG/1
40 TABLET, FILM COATED, EXTENDED RELEASE ORAL ONCE
Status: COMPLETED | OUTPATIENT
Start: 2019-01-01 | End: 2019-01-01

## 2019-01-01 RX ORDER — LANOLIN ALCOHOL/MO/W.PET/CERES
1000 CREAM (GRAM) TOPICAL DAILY
Status: DISCONTINUED | OUTPATIENT
Start: 2019-01-01 | End: 2019-01-01 | Stop reason: HOSPADM

## 2019-01-01 RX ORDER — FLUDROCORTISONE ACETATE 0.1 MG/1
100 TABLET ORAL 2 TIMES DAILY
Status: DISCONTINUED | OUTPATIENT
Start: 2019-01-01 | End: 2019-01-01

## 2019-01-01 RX ORDER — LEVETIRACETAM 500 MG/1
1500 TABLET ORAL 2 TIMES DAILY
Status: DISCONTINUED | OUTPATIENT
Start: 2019-01-01 | End: 2019-01-01

## 2019-01-01 RX ORDER — PYRIDOSTIGMINE BROMIDE 60 MG/1
60 TABLET ORAL 3 TIMES DAILY
Status: DISCONTINUED | OUTPATIENT
Start: 2019-01-01 | End: 2019-01-01

## 2019-01-01 RX ORDER — COLCHICINE 0.6 MG/1
TABLET ORAL
Qty: 3 TABLET | Refills: 0 | Status: SHIPPED | OUTPATIENT
Start: 2019-01-01

## 2019-01-01 RX ORDER — POTASSIUM CHLORIDE 20 MEQ/1
40 TABLET, EXTENDED RELEASE ORAL ONCE
Status: COMPLETED | OUTPATIENT
Start: 2019-01-01 | End: 2019-01-01

## 2019-01-01 RX ORDER — SODIUM CHLORIDE 0.9 % (FLUSH) 0.9 %
10 SYRINGE (ML) INJECTION EVERY 12 HOURS SCHEDULED
Status: DISCONTINUED | OUTPATIENT
Start: 2019-01-01 | End: 2019-01-01 | Stop reason: HOSPADM

## 2019-01-01 RX ORDER — PENICILLIN V POTASSIUM 500 MG/1
500 TABLET ORAL 3 TIMES DAILY
Qty: 21 TABLET | Refills: 0 | Status: SHIPPED | OUTPATIENT
Start: 2019-01-01 | End: 2019-01-01

## 2019-01-01 RX ORDER — FLUDROCORTISONE ACETATE 0.1 MG/1
0.1 TABLET ORAL DAILY
Status: DISCONTINUED | OUTPATIENT
Start: 2019-01-01 | End: 2019-01-01

## 2019-01-01 RX ORDER — NITROFURANTOIN 25; 75 MG/1; MG/1
100 CAPSULE ORAL 2 TIMES DAILY
Qty: 14 CAPSULE | Refills: 0 | Status: SHIPPED | OUTPATIENT
Start: 2019-01-01 | End: 2019-01-01

## 2019-01-01 RX ORDER — AMLODIPINE BESYLATE 5 MG/1
5 TABLET ORAL DAILY
Status: DISCONTINUED | OUTPATIENT
Start: 2019-01-01 | End: 2019-01-01

## 2019-01-01 RX ORDER — HYDRALAZINE HYDROCHLORIDE 25 MG/1
25 TABLET, FILM COATED ORAL EVERY 8 HOURS PRN
Qty: 90 TABLET | Refills: 0 | Status: SHIPPED | OUTPATIENT
Start: 2019-01-01

## 2019-01-01 RX ORDER — FLUDROCORTISONE ACETATE 0.1 MG/1
0.1 TABLET ORAL DAILY
Qty: 60 TABLET | Refills: 1 | Status: SHIPPED | OUTPATIENT
Start: 2019-01-01 | End: 2019-01-01

## 2019-01-01 RX ORDER — DONEPEZIL HYDROCHLORIDE 10 MG/1
10 TABLET, FILM COATED ORAL NIGHTLY PRN
Status: ON HOLD | COMMUNITY
End: 2019-01-01 | Stop reason: ALTCHOICE

## 2019-01-01 RX ORDER — POTASSIUM CHLORIDE 20 MEQ/1
40 TABLET, EXTENDED RELEASE ORAL ONCE
Status: DISCONTINUED | OUTPATIENT
Start: 2019-01-01 | End: 2019-01-01 | Stop reason: SDUPTHER

## 2019-01-01 RX ORDER — POTASSIUM CHLORIDE 7.45 MG/ML
10 INJECTION INTRAVENOUS
Status: COMPLETED | OUTPATIENT
Start: 2019-01-01 | End: 2019-01-01

## 2019-01-01 RX ORDER — CAPTOPRIL 12.5 MG/1
12.5 TABLET ORAL 2 TIMES DAILY
Status: DISCONTINUED | OUTPATIENT
Start: 2019-01-01 | End: 2019-01-01 | Stop reason: HOSPADM

## 2019-01-01 RX ORDER — LACTOBACILLUS RHAMNOSUS GG 10B CELL
1 CAPSULE ORAL DAILY
COMMUNITY

## 2019-01-01 RX ORDER — FLUDROCORTISONE ACETATE 0.1 MG/1
0.1 TABLET ORAL DAILY
Qty: 30 TABLET | Refills: 12 | Status: ON HOLD | OUTPATIENT
Start: 2019-01-01 | End: 2019-01-01 | Stop reason: HOSPADM

## 2019-01-01 RX ORDER — FLUDROCORTISONE ACETATE 0.1 MG/1
0.1 TABLET ORAL DAILY
COMMUNITY
End: 2019-01-01

## 2019-01-01 RX ORDER — LEVETIRACETAM 750 MG/1
TABLET ORAL
Qty: 360 TABLET | Refills: 2 | Status: SHIPPED | OUTPATIENT
Start: 2019-01-01 | End: 2019-01-01

## 2019-01-01 RX ORDER — POTASSIUM CHLORIDE 20 MEQ/1
40 TABLET, EXTENDED RELEASE ORAL PRN
Status: DISCONTINUED | OUTPATIENT
Start: 2019-01-01 | End: 2019-01-01 | Stop reason: HOSPADM

## 2019-01-01 RX ORDER — FERROUS SULFATE 325(65) MG
325 TABLET ORAL
Status: DISCONTINUED | OUTPATIENT
Start: 2019-01-01 | End: 2019-01-01

## 2019-01-01 RX ORDER — ACETAMINOPHEN 325 MG/1
650 TABLET ORAL 2 TIMES DAILY
COMMUNITY

## 2019-01-01 RX ORDER — SODIUM CHLORIDE 9 MG/ML
INJECTION, SOLUTION INTRAVENOUS
Status: COMPLETED
Start: 2019-01-01 | End: 2019-01-01

## 2019-01-01 RX ORDER — FLECAINIDE ACETATE 50 MG/1
50 TABLET ORAL EVERY 12 HOURS SCHEDULED
Qty: 180 TABLET | Refills: 4 | Status: SHIPPED | OUTPATIENT
Start: 2019-01-01

## 2019-01-01 RX ORDER — SODIUM CHLORIDE 0.9 % (FLUSH) 0.9 %
10 SYRINGE (ML) INJECTION PRN
Status: DISCONTINUED | OUTPATIENT
Start: 2019-01-01 | End: 2019-01-01 | Stop reason: HOSPADM

## 2019-01-01 RX ORDER — CAPTOPRIL 12.5 MG/1
12.5 TABLET ORAL 2 TIMES DAILY
Qty: 90 TABLET | Refills: 3 | Status: SHIPPED | OUTPATIENT
Start: 2019-01-01 | End: 2019-01-01

## 2019-01-01 RX ORDER — HYDRALAZINE HYDROCHLORIDE 20 MG/ML
5 INJECTION INTRAMUSCULAR; INTRAVENOUS EVERY 6 HOURS PRN
Status: DISCONTINUED | OUTPATIENT
Start: 2019-01-01 | End: 2019-01-01 | Stop reason: HOSPADM

## 2019-01-01 RX ORDER — ACETAMINOPHEN 325 MG/1
650 TABLET ORAL EVERY 4 HOURS PRN
Status: DISCONTINUED | OUTPATIENT
Start: 2019-01-01 | End: 2019-01-01 | Stop reason: HOSPADM

## 2019-01-01 RX ORDER — PANTOPRAZOLE SODIUM 40 MG/1
40 TABLET, DELAYED RELEASE ORAL
Qty: 30 TABLET | Refills: 3 | Status: SHIPPED | OUTPATIENT
Start: 2019-01-01

## 2019-01-01 RX ORDER — FLECAINIDE ACETATE 50 MG/1
50 TABLET ORAL EVERY 12 HOURS SCHEDULED
Status: DISCONTINUED | OUTPATIENT
Start: 2019-01-01 | End: 2019-01-01 | Stop reason: HOSPADM

## 2019-01-01 RX ORDER — POTASSIUM CHLORIDE 7.45 MG/ML
10 INJECTION INTRAVENOUS PRN
Status: DISCONTINUED | OUTPATIENT
Start: 2019-01-01 | End: 2019-01-01 | Stop reason: HOSPADM

## 2019-01-01 RX ORDER — FLUDROCORTISONE ACETATE 0.1 MG/1
TABLET ORAL
Qty: 180 TABLET | Refills: 1 | OUTPATIENT
Start: 2019-01-01

## 2019-01-01 RX ORDER — MIDODRINE HYDROCHLORIDE 5 MG/1
5 TABLET ORAL
Status: DISCONTINUED | OUTPATIENT
Start: 2019-01-01 | End: 2019-01-01 | Stop reason: HOSPADM

## 2019-01-01 RX ORDER — SERTRALINE HYDROCHLORIDE 100 MG/1
150 TABLET, FILM COATED ORAL DAILY
COMMUNITY

## 2019-01-01 RX ORDER — ONDANSETRON 2 MG/ML
4 INJECTION INTRAMUSCULAR; INTRAVENOUS EVERY 6 HOURS PRN
Status: DISCONTINUED | OUTPATIENT
Start: 2019-01-01 | End: 2019-01-01 | Stop reason: HOSPADM

## 2019-01-01 RX ORDER — CAPTOPRIL 12.5 MG/1
12.5 TABLET ORAL 2 TIMES DAILY
Qty: 90 TABLET | Refills: 3 | Status: SHIPPED | OUTPATIENT
Start: 2019-01-01

## 2019-01-01 RX ORDER — POTASSIUM CHLORIDE 7.45 MG/ML
40 INJECTION INTRAVENOUS ONCE
Status: DISCONTINUED | OUTPATIENT
Start: 2019-01-01 | End: 2019-01-01 | Stop reason: SDUPTHER

## 2019-01-01 RX ORDER — PYRIDOSTIGMINE BROMIDE 60 MG/1
60 TABLET ORAL 3 TIMES DAILY
Qty: 60 TABLET | Refills: 3 | Status: SHIPPED | OUTPATIENT
Start: 2019-01-01 | End: 2019-01-01 | Stop reason: SDUPTHER

## 2019-01-01 RX ORDER — MEGESTROL ACETATE 40 MG/ML
800 SUSPENSION ORAL DAILY
Qty: 600 ML | Refills: 3 | Status: SHIPPED
Start: 2019-01-01

## 2019-01-01 RX ORDER — ACETAMINOPHEN 325 MG/1
650 TABLET ORAL ONCE
Status: COMPLETED | OUTPATIENT
Start: 2019-01-01 | End: 2019-01-01

## 2019-01-01 RX ORDER — POTASSIUM CHLORIDE 29.8 MG/ML
20 INJECTION INTRAVENOUS
Status: DISCONTINUED | OUTPATIENT
Start: 2019-01-01 | End: 2019-01-01

## 2019-01-01 RX ORDER — MEGESTROL ACETATE 40 MG/ML
200 SUSPENSION ORAL DAILY
Status: DISCONTINUED | OUTPATIENT
Start: 2019-01-01 | End: 2019-01-01 | Stop reason: HOSPADM

## 2019-01-01 RX ORDER — CIPROFLOXACIN 500 MG/1
500 TABLET, FILM COATED ORAL 2 TIMES DAILY
Qty: 10 TABLET | Refills: 0 | Status: SHIPPED | OUTPATIENT
Start: 2019-01-01 | End: 2019-01-01

## 2019-01-01 RX ORDER — SODIUM CHLORIDE 9 MG/ML
INJECTION, SOLUTION INTRAVENOUS CONTINUOUS
Status: DISCONTINUED | OUTPATIENT
Start: 2019-01-01 | End: 2019-01-01

## 2019-01-01 RX ORDER — FLUDROCORTISONE ACETATE 0.1 MG/1
0.1 TABLET ORAL DAILY
Status: DISCONTINUED | OUTPATIENT
Start: 2019-01-01 | End: 2019-01-01 | Stop reason: HOSPADM

## 2019-01-01 RX ORDER — MIDODRINE HYDROCHLORIDE 5 MG/1
5 TABLET ORAL DAILY PRN
Qty: 90 TABLET | Refills: 5 | Status: ON HOLD | OUTPATIENT
Start: 2019-01-01 | End: 2019-01-01 | Stop reason: HOSPADM

## 2019-01-01 RX ORDER — MIDODRINE HYDROCHLORIDE 5 MG/1
10 TABLET ORAL 3 TIMES DAILY
Qty: 90 TABLET | Refills: 3 | Status: SHIPPED | OUTPATIENT
Start: 2019-01-01 | End: 2019-01-01 | Stop reason: DRUGHIGH

## 2019-01-01 RX ORDER — ASPIRIN 81 MG/1
81 TABLET, CHEWABLE ORAL DAILY
Status: DISCONTINUED | OUTPATIENT
Start: 2019-01-01 | End: 2019-01-01 | Stop reason: HOSPADM

## 2019-01-01 RX ORDER — DONEPEZIL HYDROCHLORIDE 5 MG/1
10 TABLET, FILM COATED ORAL NIGHTLY
Status: DISCONTINUED | OUTPATIENT
Start: 2019-01-01 | End: 2019-01-01

## 2019-01-01 RX ORDER — LABETALOL HYDROCHLORIDE 5 MG/ML
10 INJECTION, SOLUTION INTRAVENOUS EVERY 6 HOURS PRN
Status: DISCONTINUED | OUTPATIENT
Start: 2019-01-01 | End: 2019-01-01 | Stop reason: HOSPADM

## 2019-01-01 RX ORDER — LEVALBUTEROL INHALATION SOLUTION 0.63 MG/3ML
0.63 SOLUTION RESPIRATORY (INHALATION) EVERY 8 HOURS PRN
Status: DISCONTINUED | OUTPATIENT
Start: 2019-01-01 | End: 2019-01-01 | Stop reason: HOSPADM

## 2019-01-01 RX ORDER — SERTRALINE HYDROCHLORIDE 100 MG/1
TABLET, FILM COATED ORAL
Qty: 90 TABLET | Refills: 4 | Status: SHIPPED | OUTPATIENT
Start: 2019-01-01

## 2019-01-01 RX ORDER — PANTOPRAZOLE SODIUM 40 MG/1
40 TABLET, DELAYED RELEASE ORAL
Status: DISCONTINUED | OUTPATIENT
Start: 2019-01-01 | End: 2019-01-01 | Stop reason: HOSPADM

## 2019-01-01 RX ORDER — PYRIDOSTIGMINE BROMIDE 60 MG/1
60 TABLET ORAL 3 TIMES DAILY
Qty: 90 TABLET | Refills: 11 | Status: ON HOLD | OUTPATIENT
Start: 2019-01-01 | End: 2019-01-01 | Stop reason: HOSPADM

## 2019-01-01 RX ORDER — ACETAMINOPHEN 325 MG/1
650 TABLET ORAL EVERY 6 HOURS PRN
Status: DISCONTINUED | OUTPATIENT
Start: 2019-01-01 | End: 2019-01-01 | Stop reason: HOSPADM

## 2019-01-01 RX ORDER — FLUDROCORTISONE ACETATE 0.1 MG/1
0.1 TABLET ORAL 2 TIMES DAILY
Qty: 60 TABLET | Refills: 1 | Status: SHIPPED | OUTPATIENT
Start: 2019-01-01 | End: 2019-01-01 | Stop reason: SDUPTHER

## 2019-01-01 RX ORDER — LEVOTHYROXINE SODIUM 0.03 MG/1
50 TABLET ORAL DAILY
Status: DISCONTINUED | OUTPATIENT
Start: 2019-01-01 | End: 2019-01-01 | Stop reason: HOSPADM

## 2019-01-01 RX ORDER — MIDODRINE HYDROCHLORIDE 5 MG/1
5 TABLET ORAL
Status: CANCELLED | OUTPATIENT
Start: 2019-01-01

## 2019-01-01 RX ORDER — CAPTOPRIL 12.5 MG/1
12.5 TABLET ORAL 2 TIMES DAILY
Status: DISCONTINUED | OUTPATIENT
Start: 2019-01-01 | End: 2019-01-01

## 2019-01-01 RX ORDER — DONEPEZIL HYDROCHLORIDE 5 MG/1
TABLET, FILM COATED ORAL
Qty: 30 TABLET | Refills: 0 | OUTPATIENT
Start: 2019-01-01

## 2019-01-01 RX ORDER — POTASSIUM CHLORIDE AND SODIUM CHLORIDE 900; 300 MG/100ML; MG/100ML
INJECTION, SOLUTION INTRAVENOUS ONCE
Status: DISCONTINUED | OUTPATIENT
Start: 2019-01-01 | End: 2019-01-01 | Stop reason: RX

## 2019-01-01 RX ORDER — POTASSIUM CHLORIDE 20 MEQ/1
20 TABLET, EXTENDED RELEASE ORAL ONCE
Status: COMPLETED | OUTPATIENT
Start: 2019-01-01 | End: 2019-01-01

## 2019-01-01 RX ORDER — LEVOTHYROXINE SODIUM 0.05 MG/1
TABLET ORAL
Qty: 90 TABLET | Refills: 3 | Status: SHIPPED | OUTPATIENT
Start: 2019-01-01

## 2019-01-01 RX ORDER — HYDRALAZINE HYDROCHLORIDE 25 MG/1
25 TABLET, FILM COATED ORAL EVERY 8 HOURS PRN
Status: DISCONTINUED | OUTPATIENT
Start: 2019-01-01 | End: 2019-01-01 | Stop reason: HOSPADM

## 2019-01-01 RX ORDER — MIDODRINE HYDROCHLORIDE 5 MG/1
5 TABLET ORAL DAILY PRN
COMMUNITY
End: 2019-01-01

## 2019-01-01 RX ORDER — ONDANSETRON 2 MG/ML
4 INJECTION INTRAMUSCULAR; INTRAVENOUS EVERY 4 HOURS PRN
Status: DISCONTINUED | OUTPATIENT
Start: 2019-01-01 | End: 2019-01-01 | Stop reason: HOSPADM

## 2019-01-01 RX ORDER — FLECAINIDE ACETATE 100 MG/1
50 TABLET ORAL EVERY 12 HOURS SCHEDULED
Status: DISCONTINUED | OUTPATIENT
Start: 2019-01-01 | End: 2019-01-01 | Stop reason: HOSPADM

## 2019-01-01 RX ORDER — POTASSIUM CHLORIDE 7.45 MG/ML
10 INJECTION INTRAVENOUS
Status: DISCONTINUED | OUTPATIENT
Start: 2019-01-01 | End: 2019-01-01

## 2019-01-01 RX ORDER — LEVALBUTEROL INHALATION SOLUTION 0.63 MG/3ML
0.63 SOLUTION RESPIRATORY (INHALATION) EVERY 4 HOURS PRN
Status: DISCONTINUED | OUTPATIENT
Start: 2019-01-01 | End: 2019-01-01 | Stop reason: HOSPADM

## 2019-01-01 RX ORDER — MIDODRINE HYDROCHLORIDE 5 MG/1
5 TABLET ORAL PRN
Status: ON HOLD | COMMUNITY
End: 2019-01-01 | Stop reason: HOSPADM

## 2019-01-01 RX ORDER — FERROUS SULFATE 325(65) MG
325 TABLET ORAL
Status: DISCONTINUED | OUTPATIENT
Start: 2019-01-01 | End: 2019-01-01 | Stop reason: HOSPADM

## 2019-01-01 RX ORDER — MEGESTROL ACETATE 40 MG/ML
400 SUSPENSION ORAL DAILY
Status: DISCONTINUED | OUTPATIENT
Start: 2019-01-01 | End: 2019-01-01 | Stop reason: HOSPADM

## 2019-01-01 RX ORDER — MECLIZINE HCL 12.5 MG/1
12.5 TABLET ORAL ONCE
Status: COMPLETED | OUTPATIENT
Start: 2019-01-01 | End: 2019-01-01

## 2019-01-01 RX ORDER — 0.9 % SODIUM CHLORIDE 0.9 %
1000 INTRAVENOUS SOLUTION INTRAVENOUS ONCE
Status: COMPLETED | OUTPATIENT
Start: 2019-01-01 | End: 2019-01-01

## 2019-01-01 RX ORDER — POTASSIUM CHLORIDE 750 MG/1
10 TABLET, EXTENDED RELEASE ORAL DAILY
Qty: 30 TABLET | Refills: 1 | Status: SHIPPED | OUTPATIENT
Start: 2019-01-01 | End: 2019-01-01 | Stop reason: SDUPTHER

## 2019-01-01 RX ORDER — POTASSIUM CHLORIDE 7.45 MG/ML
10 INJECTION INTRAVENOUS PRN
Status: DISCONTINUED | OUTPATIENT
Start: 2019-01-01 | End: 2019-01-01 | Stop reason: CLARIF

## 2019-01-01 RX ORDER — ALBUTEROL SULFATE 2.5 MG/3ML
1.25 SOLUTION RESPIRATORY (INHALATION) EVERY 6 HOURS PRN
Status: DISCONTINUED | OUTPATIENT
Start: 2019-01-01 | End: 2019-01-01 | Stop reason: HOSPADM

## 2019-01-01 RX ORDER — QUETIAPINE FUMARATE 25 MG/1
12.5 TABLET, FILM COATED ORAL NIGHTLY
Qty: 15 TABLET | Refills: 0 | Status: SHIPPED | OUTPATIENT
Start: 2019-01-01 | End: 2019-01-01

## 2019-01-01 RX ORDER — MECLIZINE HYDROCHLORIDE 25 MG/1
25 TABLET ORAL 3 TIMES DAILY PRN
Qty: 15 TABLET | Refills: 0 | Status: SHIPPED | OUTPATIENT
Start: 2019-01-01 | End: 2019-01-01 | Stop reason: CLARIF

## 2019-01-01 RX ORDER — POTASSIUM CHLORIDE 750 MG/1
TABLET, EXTENDED RELEASE ORAL
Qty: 30 TABLET | Refills: 0 | OUTPATIENT
Start: 2019-01-01

## 2019-01-01 RX ORDER — POTASSIUM CHLORIDE 750 MG/1
TABLET, EXTENDED RELEASE ORAL
Qty: 30 TABLET | Refills: 12 | Status: ON HOLD | OUTPATIENT
Start: 2019-01-01 | End: 2019-01-01 | Stop reason: HOSPADM

## 2019-01-01 RX ORDER — MEGESTROL ACETATE 40 MG/ML
400 SUSPENSION ORAL DAILY
Qty: 360 ML | Refills: 6 | Status: SHIPPED | OUTPATIENT
Start: 2019-01-01 | End: 2019-01-01 | Stop reason: DRUGHIGH

## 2019-01-01 RX ORDER — NITROGLYCERIN 2.5 MG/D
1 PATCH TRANSDERMAL PRN
Status: DISCONTINUED | OUTPATIENT
Start: 2019-01-01 | End: 2019-01-01 | Stop reason: HOSPADM

## 2019-01-01 RX ORDER — MEGESTROL ACETATE 40 MG/ML
800 SUSPENSION ORAL DAILY
Status: DISCONTINUED | OUTPATIENT
Start: 2019-01-01 | End: 2019-01-01 | Stop reason: HOSPADM

## 2019-01-01 RX ORDER — HYDRALAZINE HYDROCHLORIDE 20 MG/ML
10 INJECTION INTRAMUSCULAR; INTRAVENOUS ONCE
Status: COMPLETED | OUTPATIENT
Start: 2019-01-01 | End: 2019-01-01

## 2019-01-01 RX ORDER — FLUDROCORTISONE ACETATE 0.1 MG/1
0.1 TABLET ORAL DAILY
Qty: 30 TABLET | Refills: 0 | Status: SHIPPED | OUTPATIENT
Start: 2019-01-01 | End: 2019-01-01

## 2019-01-01 RX ORDER — LEVETIRACETAM 750 MG/1
750 TABLET ORAL 2 TIMES DAILY
COMMUNITY

## 2019-01-01 RX ORDER — POTASSIUM CHLORIDE 7.45 MG/ML
10 INJECTION INTRAVENOUS
Status: DISPENSED | OUTPATIENT
Start: 2019-01-01 | End: 2019-01-01

## 2019-01-01 RX ORDER — LANOLIN ALCOHOL/MO/W.PET/CERES
3 CREAM (GRAM) TOPICAL ONCE
Status: COMPLETED | OUTPATIENT
Start: 2019-01-01 | End: 2019-01-01

## 2019-01-01 RX ORDER — RIVAROXABAN 15 MG/1
TABLET, FILM COATED ORAL
COMMUNITY
Start: 2019-01-01 | End: 2019-01-01 | Stop reason: CLARIF

## 2019-01-01 RX ORDER — POTASSIUM CHLORIDE 750 MG/1
20 TABLET, FILM COATED, EXTENDED RELEASE ORAL ONCE
Status: DISCONTINUED | OUTPATIENT
Start: 2019-01-01 | End: 2019-01-01

## 2019-01-01 RX ORDER — SERTRALINE HYDROCHLORIDE 100 MG/1
150 TABLET, FILM COATED ORAL DAILY
Qty: 50 TABLET | Refills: 12 | Status: SHIPPED | OUTPATIENT
Start: 2019-01-01 | End: 2019-01-01 | Stop reason: DRUGHIGH

## 2019-01-01 RX ORDER — MULTIVITAMIN WITH FOLIC ACID 400 MCG
1 TABLET ORAL DAILY
Status: DISCONTINUED | OUTPATIENT
Start: 2019-01-01 | End: 2019-01-01 | Stop reason: HOSPADM

## 2019-01-01 RX ORDER — POTASSIUM CHLORIDE 750 MG/1
10 TABLET, EXTENDED RELEASE ORAL 2 TIMES DAILY
Qty: 30 TABLET | Refills: 0 | Status: SHIPPED | OUTPATIENT
Start: 2019-01-01 | End: 2019-01-01 | Stop reason: SDUPTHER

## 2019-01-01 RX ORDER — POTASSIUM CHLORIDE 7.45 MG/ML
10 INJECTION INTRAVENOUS ONCE
Status: COMPLETED | OUTPATIENT
Start: 2019-01-01 | End: 2019-01-01

## 2019-01-01 RX ORDER — FLUDROCORTISONE ACETATE 0.1 MG/1
100 TABLET ORAL DAILY
Status: DISCONTINUED | OUTPATIENT
Start: 2019-01-01 | End: 2019-01-01 | Stop reason: HOSPADM

## 2019-01-01 RX ORDER — POTASSIUM CHLORIDE 20 MEQ/1
20 TABLET, EXTENDED RELEASE ORAL DAILY
Qty: 10 TABLET | Refills: 0 | Status: ON HOLD | OUTPATIENT
Start: 2019-01-01 | End: 2019-01-01 | Stop reason: HOSPADM

## 2019-01-01 RX ORDER — POTASSIUM CHLORIDE 20 MEQ/1
40 TABLET, EXTENDED RELEASE ORAL 2 TIMES DAILY
Status: COMPLETED | OUTPATIENT
Start: 2019-01-01 | End: 2019-01-01

## 2019-01-01 RX ORDER — MIDODRINE HYDROCHLORIDE 5 MG/1
5 TABLET ORAL DAILY PRN
Qty: 90 TABLET | Refills: 3 | Status: CANCELLED | OUTPATIENT
Start: 2019-01-01

## 2019-01-01 RX ORDER — MIDODRINE HYDROCHLORIDE 5 MG/1
5 TABLET ORAL
Qty: 90 TABLET | Refills: 3 | Status: SHIPPED | OUTPATIENT
Start: 2019-01-01 | End: 2019-01-01 | Stop reason: DRUGHIGH

## 2019-01-01 RX ORDER — POTASSIUM CHLORIDE 750 MG/1
10 TABLET, EXTENDED RELEASE ORAL DAILY
Qty: 90 TABLET | Refills: 3
Start: 2019-01-01

## 2019-01-01 RX ORDER — MIDODRINE HYDROCHLORIDE 5 MG/1
10 TABLET ORAL
Status: DISCONTINUED | OUTPATIENT
Start: 2019-01-01 | End: 2019-01-01 | Stop reason: HOSPADM

## 2019-01-01 RX ORDER — DONEPEZIL HYDROCHLORIDE 10 MG/1
TABLET, FILM COATED ORAL
Refills: 5 | COMMUNITY
Start: 2019-01-01 | End: 2019-01-01 | Stop reason: CLARIF

## 2019-01-01 RX ADMIN — POTASSIUM CHLORIDE 10 MEQ: 7.46 INJECTION, SOLUTION INTRAVENOUS at 21:31

## 2019-01-01 RX ADMIN — POTASSIUM CHLORIDE 10 MEQ: 7.46 INJECTION, SOLUTION INTRAVENOUS at 21:59

## 2019-01-01 RX ADMIN — FLECAINIDE ACETATE 50 MG: 50 TABLET ORAL at 09:04

## 2019-01-01 RX ADMIN — LEVETIRACETAM 750 MG: 500 TABLET, FILM COATED ORAL at 23:54

## 2019-01-01 RX ADMIN — ENOXAPARIN SODIUM 40 MG: 40 INJECTION SUBCUTANEOUS at 22:39

## 2019-01-01 RX ADMIN — FLUDROCORTISONE ACETATE 100 MCG: 0.1 TABLET ORAL at 22:03

## 2019-01-01 RX ADMIN — LABETALOL HYDROCHLORIDE 10 MG: 5 INJECTION, SOLUTION INTRAVENOUS at 08:35

## 2019-01-01 RX ADMIN — MIDODRINE HYDROCHLORIDE 10 MG: 5 TABLET ORAL at 09:57

## 2019-01-01 RX ADMIN — ENOXAPARIN SODIUM 40 MG: 40 INJECTION SUBCUTANEOUS at 22:02

## 2019-01-01 RX ADMIN — LEVETIRACETAM 1500 MG: 500 TABLET ORAL at 22:40

## 2019-01-01 RX ADMIN — FLECAINIDE ACETATE 50 MG: 50 TABLET ORAL at 22:43

## 2019-01-01 RX ADMIN — THERA TABS 1 TABLET: TAB at 08:40

## 2019-01-01 RX ADMIN — FLECAINIDE ACETATE 50 MG: 50 TABLET ORAL at 22:03

## 2019-01-01 RX ADMIN — POTASSIUM CHLORIDE 10 MEQ: 7.46 INJECTION, SOLUTION INTRAVENOUS at 22:49

## 2019-01-01 RX ADMIN — ACETAMINOPHEN 650 MG: 325 TABLET, FILM COATED ORAL at 04:44

## 2019-01-01 RX ADMIN — Medication 10 ML: at 09:04

## 2019-01-01 RX ADMIN — HYDRALAZINE HYDROCHLORIDE 5 MG: 20 INJECTION INTRAMUSCULAR; INTRAVENOUS at 03:58

## 2019-01-01 RX ADMIN — Medication 10 ML: at 23:54

## 2019-01-01 RX ADMIN — FLECAINIDE ACETATE 50 MG: 50 TABLET ORAL at 23:54

## 2019-01-01 RX ADMIN — FLECAINIDE ACETATE 50 MG: 50 TABLET ORAL at 22:41

## 2019-01-01 RX ADMIN — FERROUS SULFATE TAB 325 MG (65 MG ELEMENTAL FE) 325 MG: 325 (65 FE) TAB at 11:31

## 2019-01-01 RX ADMIN — FLECAINIDE ACETATE 50 MG: 50 TABLET ORAL at 08:41

## 2019-01-01 RX ADMIN — PANTOPRAZOLE SODIUM 40 MG: 40 TABLET, DELAYED RELEASE ORAL at 05:53

## 2019-01-01 RX ADMIN — METOPROLOL TARTRATE 25 MG: 25 TABLET, FILM COATED ORAL at 23:15

## 2019-01-01 RX ADMIN — SERTRALINE 100 MG: 50 TABLET, FILM COATED ORAL at 08:51

## 2019-01-01 RX ADMIN — SODIUM CHLORIDE: 9 INJECTION, SOLUTION INTRAVENOUS at 18:52

## 2019-01-01 RX ADMIN — SERTRALINE 150 MG: 50 TABLET, FILM COATED ORAL at 08:08

## 2019-01-01 RX ADMIN — LEVOTHYROXINE SODIUM 50 MCG: 25 TABLET ORAL at 08:08

## 2019-01-01 RX ADMIN — SODIUM CHLORIDE: 9 INJECTION, SOLUTION INTRAVENOUS at 03:08

## 2019-01-01 RX ADMIN — THERA TABS 1 TABLET: TAB at 09:57

## 2019-01-01 RX ADMIN — POTASSIUM CHLORIDE 10 MEQ: 7.46 INJECTION, SOLUTION INTRAVENOUS at 19:41

## 2019-01-01 RX ADMIN — LEVETIRACETAM 750 MG: 500 TABLET, FILM COATED ORAL at 23:15

## 2019-01-01 RX ADMIN — Medication 10 ML: at 22:03

## 2019-01-01 RX ADMIN — FLECAINIDE ACETATE 50 MG: 50 TABLET ORAL at 09:02

## 2019-01-01 RX ADMIN — LABETALOL HYDROCHLORIDE 10 MG: 5 INJECTION, SOLUTION INTRAVENOUS at 17:29

## 2019-01-01 RX ADMIN — POTASSIUM CHLORIDE 40 MEQ: 1500 TABLET, EXTENDED RELEASE ORAL at 22:44

## 2019-01-01 RX ADMIN — HYDRALAZINE HYDROCHLORIDE 5 MG: 20 INJECTION INTRAMUSCULAR; INTRAVENOUS at 12:20

## 2019-01-01 RX ADMIN — LIDOCAINE HYDROCHLORIDE: 20 SOLUTION ORAL; TOPICAL at 09:43

## 2019-01-01 RX ADMIN — CYANOCOBALAMIN TAB 1000 MCG 1000 MCG: 1000 TAB at 09:58

## 2019-01-01 RX ADMIN — LEVETIRACETAM 750 MG: 500 TABLET ORAL at 10:54

## 2019-01-01 RX ADMIN — SERTRALINE 100 MG: 50 TABLET, FILM COATED ORAL at 09:04

## 2019-01-01 RX ADMIN — POTASSIUM CHLORIDE 10 MEQ: 7.46 INJECTION, SOLUTION INTRAVENOUS at 22:47

## 2019-01-01 RX ADMIN — FLUDROCORTISONE ACETATE 100 MCG: 0.1 TABLET ORAL at 22:41

## 2019-01-01 RX ADMIN — LEVOTHYROXINE SODIUM 50 MCG: 25 TABLET ORAL at 10:12

## 2019-01-01 RX ADMIN — POTASSIUM CHLORIDE 40 MEQ: 1500 TABLET, EXTENDED RELEASE ORAL at 18:11

## 2019-01-01 RX ADMIN — FLECAINIDE ACETATE 50 MG: 50 TABLET ORAL at 09:57

## 2019-01-01 RX ADMIN — ACETAMINOPHEN 650 MG: 325 TABLET, FILM COATED ORAL at 15:50

## 2019-01-01 RX ADMIN — ASPIRIN 81 MG 81 MG: 81 TABLET ORAL at 10:12

## 2019-01-01 RX ADMIN — LABETALOL HYDROCHLORIDE 10 MG: 5 INJECTION, SOLUTION INTRAVENOUS at 14:41

## 2019-01-01 RX ADMIN — FERROUS SULFATE TAB 325 MG (65 MG ELEMENTAL FE) 325 MG: 325 (65 FE) TAB at 13:24

## 2019-01-01 RX ADMIN — FLECAINIDE ACETATE 50 MG: 50 TABLET ORAL at 08:40

## 2019-01-01 RX ADMIN — Medication 10 ML: at 08:51

## 2019-01-01 RX ADMIN — POTASSIUM CHLORIDE 10 MEQ: 7.46 INJECTION, SOLUTION INTRAVENOUS at 22:48

## 2019-01-01 RX ADMIN — MEGESTROL ACETATE 200 MG: 40 SUSPENSION ORAL at 09:01

## 2019-01-01 RX ADMIN — MEGESTROL ACETATE 800 MG: 40 SUSPENSION ORAL at 08:15

## 2019-01-01 RX ADMIN — Medication 10 ML: at 08:47

## 2019-01-01 RX ADMIN — ACETAMINOPHEN 650 MG: 325 TABLET, FILM COATED ORAL at 18:56

## 2019-01-01 RX ADMIN — LEVOTHYROXINE SODIUM 50 MCG: 25 TABLET ORAL at 08:41

## 2019-01-01 RX ADMIN — SODIUM CHLORIDE: 9 INJECTION, SOLUTION INTRAVENOUS at 16:01

## 2019-01-01 RX ADMIN — LEVOTHYROXINE SODIUM 50 MCG: 25 TABLET ORAL at 06:07

## 2019-01-01 RX ADMIN — POTASSIUM CHLORIDE 40 MEQ: 750 TABLET, FILM COATED, EXTENDED RELEASE ORAL at 13:10

## 2019-01-01 RX ADMIN — MIDODRINE HYDROCHLORIDE 5 MG: 5 TABLET ORAL at 09:02

## 2019-01-01 RX ADMIN — DONEPEZIL HYDROCHLORIDE 10 MG: 5 TABLET, FILM COATED ORAL at 23:54

## 2019-01-01 RX ADMIN — POTASSIUM CHLORIDE 40 MEQ: 1500 TABLET, EXTENDED RELEASE ORAL at 13:24

## 2019-01-01 RX ADMIN — SERTRALINE 100 MG: 50 TABLET, FILM COATED ORAL at 09:02

## 2019-01-01 RX ADMIN — LEVETIRACETAM 750 MG: 500 TABLET, FILM COATED ORAL at 09:03

## 2019-01-01 RX ADMIN — Medication 10 ML: at 10:18

## 2019-01-01 RX ADMIN — SERTRALINE 100 MG: 50 TABLET, FILM COATED ORAL at 09:57

## 2019-01-01 RX ADMIN — HYDRALAZINE HYDROCHLORIDE 5 MG: 20 INJECTION INTRAMUSCULAR; INTRAVENOUS at 03:47

## 2019-01-01 RX ADMIN — LEVETIRACETAM 750 MG: 500 TABLET, FILM COATED ORAL at 08:07

## 2019-01-01 RX ADMIN — MEGESTROL ACETATE 200 MG: 40 SUSPENSION ORAL at 09:03

## 2019-01-01 RX ADMIN — DONEPEZIL HYDROCHLORIDE 10 MG: 5 TABLET, FILM COATED ORAL at 21:50

## 2019-01-01 RX ADMIN — POTASSIUM CHLORIDE 10 MEQ: 7.46 INJECTION, SOLUTION INTRAVENOUS at 23:50

## 2019-01-01 RX ADMIN — CAPTOPRIL 12.5 MG: 12.5 TABLET ORAL at 20:45

## 2019-01-01 RX ADMIN — Medication 10 ML: at 18:52

## 2019-01-01 RX ADMIN — LEVOTHYROXINE SODIUM 50 MCG: 25 TABLET ORAL at 05:12

## 2019-01-01 RX ADMIN — FLUDROCORTISONE ACETATE 0.1 MG: 0.1 TABLET ORAL at 09:04

## 2019-01-01 RX ADMIN — LEVETIRACETAM 750 MG: 500 TABLET ORAL at 22:02

## 2019-01-01 RX ADMIN — POTASSIUM CHLORIDE 10 MEQ: 7.46 INJECTION, SOLUTION INTRAVENOUS at 16:25

## 2019-01-01 RX ADMIN — RIVAROXABAN 15 MG: 15 TABLET, FILM COATED ORAL at 17:31

## 2019-01-01 RX ADMIN — Medication 10 MEQ: at 15:34

## 2019-01-01 RX ADMIN — FLUDROCORTISONE ACETATE 100 MCG: 0.1 TABLET ORAL at 08:40

## 2019-01-01 RX ADMIN — Medication 10 MEQ: at 23:51

## 2019-01-01 RX ADMIN — ACETAMINOPHEN 650 MG: 325 TABLET, FILM COATED ORAL at 09:01

## 2019-01-01 RX ADMIN — FLECAINIDE ACETATE 50 MG: 50 TABLET ORAL at 21:50

## 2019-01-01 RX ADMIN — POTASSIUM CHLORIDE 40 MEQ: 1500 TABLET, EXTENDED RELEASE ORAL at 11:30

## 2019-01-01 RX ADMIN — FERROUS SULFATE TAB 325 MG (65 MG ELEMENTAL FE) 325 MG: 325 (65 FE) TAB at 09:58

## 2019-01-01 RX ADMIN — POTASSIUM CHLORIDE 40 MEQ: 750 TABLET, FILM COATED, EXTENDED RELEASE ORAL at 09:04

## 2019-01-01 RX ADMIN — CYANOCOBALAMIN TAB 1000 MCG 1000 MCG: 1000 TAB at 08:40

## 2019-01-01 RX ADMIN — FLUDROCORTISONE ACETATE 100 MCG: 0.1 TABLET ORAL at 20:24

## 2019-01-01 RX ADMIN — FLUDROCORTISONE ACETATE 100 MCG: 0.1 TABLET ORAL at 20:46

## 2019-01-01 RX ADMIN — MEGESTROL ACETATE 400 MG: 40 SUSPENSION ORAL at 08:41

## 2019-01-01 RX ADMIN — MIDODRINE HYDROCHLORIDE 5 MG: 5 TABLET ORAL at 11:51

## 2019-01-01 RX ADMIN — LEVETIRACETAM 1500 MG: 500 TABLET ORAL at 20:24

## 2019-01-01 RX ADMIN — ACETAMINOPHEN 650 MG: 325 TABLET, FILM COATED ORAL at 17:12

## 2019-01-01 RX ADMIN — CYANOCOBALAMIN TAB 1000 MCG 1000 MCG: 1000 TAB at 09:04

## 2019-01-01 RX ADMIN — LABETALOL HYDROCHLORIDE 10 MG: 5 INJECTION, SOLUTION INTRAVENOUS at 00:56

## 2019-01-01 RX ADMIN — RIVAROXABAN 15 MG: 15 TABLET, FILM COATED ORAL at 17:28

## 2019-01-01 RX ADMIN — Medication 10 ML: at 23:51

## 2019-01-01 RX ADMIN — SODIUM CHLORIDE: 9 INJECTION, SOLUTION INTRAVENOUS at 17:01

## 2019-01-01 RX ADMIN — PANTOPRAZOLE SODIUM 40 MG: 40 TABLET, DELAYED RELEASE ORAL at 06:20

## 2019-01-01 RX ADMIN — POTASSIUM CHLORIDE 40 MEQ: 750 TABLET, FILM COATED, EXTENDED RELEASE ORAL at 17:28

## 2019-01-01 RX ADMIN — TIOTROPIUM BROMIDE INHALATION SPRAY 2 PUFF: 3.12 SPRAY, METERED RESPIRATORY (INHALATION) at 08:47

## 2019-01-01 RX ADMIN — POTASSIUM CHLORIDE 10 MEQ: 7.46 INJECTION, SOLUTION INTRAVENOUS at 05:18

## 2019-01-01 RX ADMIN — LABETALOL HYDROCHLORIDE 10 MG: 5 INJECTION, SOLUTION INTRAVENOUS at 20:46

## 2019-01-01 RX ADMIN — MECLIZINE 12.5 MG: 12.5 TABLET ORAL at 18:33

## 2019-01-01 RX ADMIN — POTASSIUM CHLORIDE 20 MEQ: 1500 TABLET, EXTENDED RELEASE ORAL at 17:12

## 2019-01-01 RX ADMIN — MIDODRINE HYDROCHLORIDE 5 MG: 5 TABLET ORAL at 17:28

## 2019-01-01 RX ADMIN — POTASSIUM CHLORIDE 10 MEQ: 7.46 INJECTION, SOLUTION INTRAVENOUS at 22:46

## 2019-01-01 RX ADMIN — FLUDROCORTISONE ACETATE 0.1 MG: 0.1 TABLET ORAL at 08:51

## 2019-01-01 RX ADMIN — Medication 10 ML: at 09:58

## 2019-01-01 RX ADMIN — POTASSIUM CHLORIDE 10 MEQ: 7.46 INJECTION, SOLUTION INTRAVENOUS at 13:12

## 2019-01-01 RX ADMIN — LEVETIRACETAM 1500 MG: 500 TABLET ORAL at 08:40

## 2019-01-01 RX ADMIN — MEGESTROL ACETATE 400 MG: 40 SUSPENSION ORAL at 09:04

## 2019-01-01 RX ADMIN — SODIUM CHLORIDE: 9 INJECTION, SOLUTION INTRAVENOUS at 17:00

## 2019-01-01 RX ADMIN — TIOTROPIUM BROMIDE INHALATION SPRAY 2 PUFF: 3.12 SPRAY, METERED RESPIRATORY (INHALATION) at 11:05

## 2019-01-01 RX ADMIN — FLECAINIDE ACETATE 50 MG: 50 TABLET ORAL at 08:51

## 2019-01-01 RX ADMIN — POTASSIUM CHLORIDE 10 MEQ: 7.46 INJECTION, SOLUTION INTRAVENOUS at 07:29

## 2019-01-01 RX ADMIN — CAPTOPRIL 12.5 MG: 12.5 TABLET ORAL at 06:16

## 2019-01-01 RX ADMIN — ENOXAPARIN SODIUM 40 MG: 40 INJECTION SUBCUTANEOUS at 08:08

## 2019-01-01 RX ADMIN — FLUDROCORTISONE ACETATE 100 MCG: 0.1 TABLET ORAL at 09:04

## 2019-01-01 RX ADMIN — PANTOPRAZOLE SODIUM 40 MG: 40 TABLET, DELAYED RELEASE ORAL at 08:42

## 2019-01-01 RX ADMIN — POTASSIUM CHLORIDE 10 MEQ: 7.46 INJECTION, SOLUTION INTRAVENOUS at 12:01

## 2019-01-01 RX ADMIN — LEVOTHYROXINE SODIUM 50 MCG: 25 TABLET ORAL at 06:20

## 2019-01-01 RX ADMIN — Medication 10 ML: at 09:01

## 2019-01-01 RX ADMIN — TIOTROPIUM BROMIDE INHALATION SPRAY 2 PUFF: 3.12 SPRAY, METERED RESPIRATORY (INHALATION) at 08:28

## 2019-01-01 RX ADMIN — ACETAMINOPHEN 650 MG: 325 TABLET, FILM COATED ORAL at 10:24

## 2019-01-01 RX ADMIN — HYDRALAZINE HYDROCHLORIDE 25 MG: 25 TABLET, FILM COATED ORAL at 23:30

## 2019-01-01 RX ADMIN — LEVOTHYROXINE SODIUM 50 MCG: 25 TABLET ORAL at 06:25

## 2019-01-01 RX ADMIN — TIOTROPIUM BROMIDE INHALATION SPRAY 2 PUFF: 3.12 SPRAY, METERED RESPIRATORY (INHALATION) at 09:26

## 2019-01-01 RX ADMIN — ENOXAPARIN SODIUM 40 MG: 40 INJECTION SUBCUTANEOUS at 20:46

## 2019-01-01 RX ADMIN — POTASSIUM CHLORIDE 10 MEQ: 7.46 INJECTION, SOLUTION INTRAVENOUS at 19:40

## 2019-01-01 RX ADMIN — Medication 10 ML: at 23:16

## 2019-01-01 RX ADMIN — ACETAMINOPHEN 650 MG: 325 TABLET, FILM COATED ORAL at 20:46

## 2019-01-01 RX ADMIN — FLECAINIDE ACETATE 50 MG: 100 TABLET ORAL at 08:08

## 2019-01-01 RX ADMIN — MIDODRINE HYDROCHLORIDE 5 MG: 5 TABLET ORAL at 13:10

## 2019-01-01 RX ADMIN — POTASSIUM CHLORIDE 10 MEQ: 7.46 INJECTION, SOLUTION INTRAVENOUS at 06:21

## 2019-01-01 RX ADMIN — FLUDROCORTISONE ACETATE 100 MCG: 0.1 TABLET ORAL at 08:41

## 2019-01-01 RX ADMIN — Medication 10 ML: at 20:47

## 2019-01-01 RX ADMIN — FERROUS SULFATE TAB 325 MG (65 MG ELEMENTAL FE) 325 MG: 325 (65 FE) TAB at 08:40

## 2019-01-01 RX ADMIN — Medication 10 MEQ: at 14:17

## 2019-01-01 RX ADMIN — SERTRALINE 100 MG: 50 TABLET, FILM COATED ORAL at 08:41

## 2019-01-01 RX ADMIN — FLECAINIDE ACETATE 50 MG: 100 TABLET ORAL at 20:45

## 2019-01-01 RX ADMIN — THERA TABS 1 TABLET: TAB at 09:04

## 2019-01-01 RX ADMIN — Medication 10 ML: at 22:40

## 2019-01-01 RX ADMIN — SODIUM CHLORIDE 1000 ML: 9 INJECTION, SOLUTION INTRAVENOUS at 15:39

## 2019-01-01 RX ADMIN — HYDRALAZINE HYDROCHLORIDE 10 MG: 20 INJECTION INTRAMUSCULAR; INTRAVENOUS at 16:58

## 2019-01-01 RX ADMIN — HYDRALAZINE HYDROCHLORIDE 5 MG: 20 INJECTION INTRAMUSCULAR; INTRAVENOUS at 10:14

## 2019-01-01 RX ADMIN — LEVOTHYROXINE SODIUM 50 MCG: 25 TABLET ORAL at 05:18

## 2019-01-01 RX ADMIN — Medication 10 ML: at 08:09

## 2019-01-01 RX ADMIN — FLECAINIDE ACETATE 50 MG: 100 TABLET ORAL at 10:12

## 2019-01-01 RX ADMIN — LEVETIRACETAM 750 MG: 500 TABLET, FILM COATED ORAL at 21:49

## 2019-01-01 RX ADMIN — ACETAMINOPHEN 650 MG: 325 TABLET, FILM COATED ORAL at 18:59

## 2019-01-01 RX ADMIN — POTASSIUM CHLORIDE: 2 INJECTION, SOLUTION, CONCENTRATE INTRAVENOUS at 16:48

## 2019-01-01 RX ADMIN — TIOTROPIUM BROMIDE INHALATION SPRAY 2 PUFF: 3.12 SPRAY, METERED RESPIRATORY (INHALATION) at 07:32

## 2019-01-01 RX ADMIN — FLECAINIDE ACETATE 50 MG: 50 TABLET ORAL at 23:15

## 2019-01-01 RX ADMIN — LEVETIRACETAM 750 MG: 500 TABLET, FILM COATED ORAL at 08:51

## 2019-01-01 RX ADMIN — THERA TABS 1 TABLET: TAB at 08:41

## 2019-01-01 RX ADMIN — Medication 10 ML: at 09:03

## 2019-01-01 RX ADMIN — FLUDROCORTISONE ACETATE 100 MCG: 0.1 TABLET ORAL at 09:58

## 2019-01-01 RX ADMIN — MEGESTROL ACETATE 800 MG: 40 SUSPENSION ORAL at 10:25

## 2019-01-01 RX ADMIN — POTASSIUM CHLORIDE 10 MEQ: 7.46 INJECTION, SOLUTION INTRAVENOUS at 09:56

## 2019-01-01 RX ADMIN — PYRIDOSTIGMINE BROMIDE 60 MG: 60 TABLET ORAL at 13:54

## 2019-01-01 RX ADMIN — POTASSIUM BICARBONATE 40 MEQ: 782 TABLET, EFFERVESCENT ORAL at 08:38

## 2019-01-01 RX ADMIN — POTASSIUM CHLORIDE 40 MEQ: 750 TABLET, FILM COATED, EXTENDED RELEASE ORAL at 18:33

## 2019-01-01 RX ADMIN — POTASSIUM CHLORIDE 10 MEQ: 7.46 INJECTION, SOLUTION INTRAVENOUS at 18:52

## 2019-01-01 RX ADMIN — POTASSIUM CHLORIDE 10 MEQ: 7.46 INJECTION, SOLUTION INTRAVENOUS at 14:23

## 2019-01-01 RX ADMIN — PANTOPRAZOLE SODIUM 40 MG: 40 TABLET, DELAYED RELEASE ORAL at 06:16

## 2019-01-01 RX ADMIN — Medication 10 ML: at 08:35

## 2019-01-01 RX ADMIN — LEVETIRACETAM 750 MG: 500 TABLET ORAL at 09:04

## 2019-01-01 RX ADMIN — LEVETIRACETAM 1500 MG: 500 TABLET ORAL at 09:57

## 2019-01-01 RX ADMIN — POTASSIUM CHLORIDE 10 MEQ: 7.46 INJECTION, SOLUTION INTRAVENOUS at 14:40

## 2019-01-01 RX ADMIN — POTASSIUM CHLORIDE 10 MEQ: 7.46 INJECTION, SOLUTION INTRAVENOUS at 10:58

## 2019-01-01 RX ADMIN — FLUDROCORTISONE ACETATE 0.1 MG: 0.1 TABLET ORAL at 12:49

## 2019-01-01 RX ADMIN — HYDRALAZINE HYDROCHLORIDE 5 MG: 20 INJECTION INTRAMUSCULAR; INTRAVENOUS at 18:51

## 2019-01-01 RX ADMIN — PYRIDOSTIGMINE BROMIDE 60 MG: 60 TABLET ORAL at 20:23

## 2019-01-01 RX ADMIN — POTASSIUM CHLORIDE 10 MEQ: 7.46 INJECTION, SOLUTION INTRAVENOUS at 08:39

## 2019-01-01 RX ADMIN — ACETAMINOPHEN 650 MG: 325 TABLET, FILM COATED ORAL at 01:27

## 2019-01-01 RX ADMIN — ACETAMINOPHEN 650 MG: 325 TABLET, FILM COATED ORAL at 06:28

## 2019-01-01 RX ADMIN — SERTRALINE 100 MG: 50 TABLET, FILM COATED ORAL at 08:40

## 2019-01-01 RX ADMIN — PYRIDOSTIGMINE BROMIDE 60 MG: 60 TABLET ORAL at 08:40

## 2019-01-01 RX ADMIN — CYANOCOBALAMIN TAB 1000 MCG 1000 MCG: 1000 TAB at 08:41

## 2019-01-01 RX ADMIN — LEVETIRACETAM 750 MG: 500 TABLET, FILM COATED ORAL at 10:13

## 2019-01-01 RX ADMIN — MELATONIN TAB 3 MG 3 MG: 3 TAB at 03:07

## 2019-01-01 RX ADMIN — FLUDROCORTISONE ACETATE 0.1 MG: 0.1 TABLET ORAL at 08:08

## 2019-01-01 RX ADMIN — POTASSIUM CHLORIDE 10 MEQ: 7.46 INJECTION, SOLUTION INTRAVENOUS at 10:01

## 2019-01-01 RX ADMIN — MEGESTROL ACETATE 400 MG: 40 SUSPENSION ORAL at 09:58

## 2019-01-01 RX ADMIN — SERTRALINE 150 MG: 50 TABLET, FILM COATED ORAL at 10:12

## 2019-01-01 RX ADMIN — LEVETIRACETAM 750 MG: 500 TABLET, FILM COATED ORAL at 09:02

## 2019-01-01 RX ADMIN — PYRIDOSTIGMINE BROMIDE 60 MG: 60 TABLET ORAL at 09:58

## 2019-01-01 RX ADMIN — LEVETIRACETAM 750 MG: 500 TABLET, FILM COATED ORAL at 20:45

## 2019-01-01 RX ADMIN — ASPIRIN 81 MG 81 MG: 81 TABLET ORAL at 08:08

## 2019-01-01 RX ADMIN — ENOXAPARIN SODIUM 40 MG: 40 INJECTION SUBCUTANEOUS at 20:23

## 2019-01-01 RX ADMIN — FLECAINIDE ACETATE 50 MG: 50 TABLET ORAL at 20:24

## 2019-01-01 RX ADMIN — ACETAMINOPHEN 650 MG: 325 TABLET, FILM COATED ORAL at 21:53

## 2019-01-01 RX ADMIN — PYRIDOSTIGMINE BROMIDE 60 MG: 60 TABLET ORAL at 22:40

## 2019-01-01 RX ADMIN — LEVOTHYROXINE SODIUM 50 MCG: 25 TABLET ORAL at 09:03

## 2019-01-01 RX ADMIN — MEGESTROL ACETATE 200 MG: 40 SUSPENSION ORAL at 08:50

## 2019-01-01 RX ADMIN — POTASSIUM CHLORIDE 10 MEQ: 7.46 INJECTION, SOLUTION INTRAVENOUS at 04:24

## 2019-01-01 ASSESSMENT — ENCOUNTER SYMPTOMS
ABDOMINAL PAIN: 0
ABDOMINAL PAIN: 0
SHORTNESS OF BREATH: 0
DIARRHEA: 0
CONSTIPATION: 0
NAUSEA: 0
CONSTIPATION: 0
RHINORRHEA: 0
VOMITING: 1
COUGH: 0
ABDOMINAL PAIN: 0
DIARRHEA: 0
DIARRHEA: 0
COUGH: 1
BACK PAIN: 1
BLOOD IN STOOL: 0
VOMITING: 0
NAUSEA: 0
WHEEZING: 0
SORE THROAT: 0
SHORTNESS OF BREATH: 0
DIARRHEA: 1
COUGH: 1
RHINORRHEA: 0
VOMITING: 0
WHEEZING: 0
VOMITING: 0
NAUSEA: 1
SHORTNESS OF BREATH: 0
SHORTNESS OF BREATH: 1
NAUSEA: 0

## 2019-01-01 ASSESSMENT — PAIN DESCRIPTION - PAIN TYPE
TYPE: ACUTE PAIN

## 2019-01-01 ASSESSMENT — PAIN SCALES - GENERAL
PAINLEVEL_OUTOF10: 9
PAINLEVEL_OUTOF10: 3
PAINLEVEL_OUTOF10: 0
PAINLEVEL_OUTOF10: 0
PAINLEVEL_OUTOF10: 2
PAINLEVEL_OUTOF10: 8
PAINLEVEL_OUTOF10: 0
PAINLEVEL_OUTOF10: 7
PAINLEVEL_OUTOF10: 4
PAINLEVEL_OUTOF10: 7
PAINLEVEL_OUTOF10: 0
PAINLEVEL_OUTOF10: 0
PAINLEVEL_OUTOF10: 2
PAINLEVEL_OUTOF10: 6
PAINLEVEL_OUTOF10: 0
PAINLEVEL_OUTOF10: 0
PAINLEVEL_OUTOF10: 3
PAINLEVEL_OUTOF10: 0
PAINLEVEL_OUTOF10: 2
PAINLEVEL_OUTOF10: 9
PAINLEVEL_OUTOF10: 8
PAINLEVEL_OUTOF10: 2
PAINLEVEL_OUTOF10: 10
PAINLEVEL_OUTOF10: 6
PAINLEVEL_OUTOF10: 2
PAINLEVEL_OUTOF10: 6
PAINLEVEL_OUTOF10: 5
PAINLEVEL_OUTOF10: 6
PAINLEVEL_OUTOF10: 0
PAINLEVEL_OUTOF10: 7
PAINLEVEL_OUTOF10: 2
PAINLEVEL_OUTOF10: 6
PAINLEVEL_OUTOF10: 5
PAINLEVEL_OUTOF10: 0
PAINLEVEL_OUTOF10: 0
PAINLEVEL_OUTOF10: 5
PAINLEVEL_OUTOF10: 0
PAINLEVEL_OUTOF10: 5
PAINLEVEL_OUTOF10: 0
PAINLEVEL_OUTOF10: 0
PAINLEVEL_OUTOF10: 5
PAINLEVEL_OUTOF10: 4
PAINLEVEL_OUTOF10: 0
PAINLEVEL_OUTOF10: 0
PAINLEVEL_OUTOF10: 5
PAINLEVEL_OUTOF10: 0
PAINLEVEL_OUTOF10: 4
PAINLEVEL_OUTOF10: 8

## 2019-01-01 ASSESSMENT — PAIN DESCRIPTION - ONSET: ONSET: PROGRESSIVE

## 2019-01-01 ASSESSMENT — PAIN DESCRIPTION - LOCATION
LOCATION: HEAD
LOCATION: NECK
LOCATION: HEAD
LOCATION: SHOULDER;NECK
LOCATION: ANKLE
LOCATION: HEAD

## 2019-01-01 ASSESSMENT — PAIN DESCRIPTION - PROGRESSION
CLINICAL_PROGRESSION: GRADUALLY IMPROVING

## 2019-01-01 ASSESSMENT — PAIN DESCRIPTION - DESCRIPTORS
DESCRIPTORS: ACHING
DESCRIPTORS: ACHING
DESCRIPTORS: THROBBING
DESCRIPTORS: SORE;TENDER
DESCRIPTORS: HEADACHE
DESCRIPTORS: THROBBING
DESCRIPTORS: ACHING
DESCRIPTORS: ACHING
DESCRIPTORS: HEADACHE
DESCRIPTORS: ACHING
DESCRIPTORS: ACHING
DESCRIPTORS: HEADACHE
DESCRIPTORS: THROBBING

## 2019-01-01 ASSESSMENT — PAIN - FUNCTIONAL ASSESSMENT
PAIN_FUNCTIONAL_ASSESSMENT: ACTIVITIES ARE NOT PREVENTED

## 2019-01-01 ASSESSMENT — PAIN DESCRIPTION - ORIENTATION
ORIENTATION: LEFT;RIGHT;POSTERIOR
ORIENTATION: RIGHT;LEFT
ORIENTATION: MID
ORIENTATION: RIGHT;LEFT
ORIENTATION: RIGHT;LEFT
ORIENTATION: MID
ORIENTATION: RIGHT
ORIENTATION: MID
ORIENTATION: RIGHT;LEFT
ORIENTATION: MID

## 2019-01-01 ASSESSMENT — PAIN DESCRIPTION - FREQUENCY: FREQUENCY: INTERMITTENT

## 2019-01-01 ASSESSMENT — PAIN DESCRIPTION - DIRECTION: RADIATING_TOWARDS: BACK OF NECK

## 2019-01-03 ENCOUNTER — APPOINTMENT (OUTPATIENT)
Dept: GENERAL RADIOLOGY | Age: 84
DRG: 305 | End: 2019-01-03
Payer: MEDICARE

## 2019-01-03 ENCOUNTER — HOSPITAL ENCOUNTER (INPATIENT)
Age: 84
LOS: 1 days | Discharge: HOME | DRG: 305 | End: 2019-01-04
Attending: EMERGENCY MEDICINE | Admitting: FAMILY MEDICINE
Payer: MEDICARE

## 2019-01-03 ENCOUNTER — APPOINTMENT (OUTPATIENT)
Dept: CT IMAGING | Age: 84
DRG: 305 | End: 2019-01-03
Payer: MEDICARE

## 2019-01-03 DIAGNOSIS — I16.9 HYPERTENSIVE CRISIS: Primary | ICD-10-CM

## 2019-01-03 DIAGNOSIS — R47.1 DYSARTHRIA: ICD-10-CM

## 2019-01-03 LAB
A/G RATIO: 1.3 (ref 1.1–2.2)
ALBUMIN SERPL-MCNC: 4.5 G/DL (ref 3.4–5)
ALP BLD-CCNC: 50 U/L (ref 40–129)
ALT SERPL-CCNC: 21 U/L (ref 10–40)
ANION GAP SERPL CALCULATED.3IONS-SCNC: 11 MMOL/L (ref 3–16)
APTT: 25.6 SEC (ref 26–36)
AST SERPL-CCNC: 46 U/L (ref 15–37)
BASOPHILS ABSOLUTE: 0 K/UL (ref 0–0.2)
BASOPHILS RELATIVE PERCENT: 0.7 %
BILIRUB SERPL-MCNC: 0.4 MG/DL (ref 0–1)
BUN BLDV-MCNC: 15 MG/DL (ref 7–20)
CALCIUM SERPL-MCNC: 10.3 MG/DL (ref 8.3–10.6)
CHLORIDE BLD-SCNC: 104 MMOL/L (ref 99–110)
CO2: 24 MMOL/L (ref 21–32)
CREAT SERPL-MCNC: 0.7 MG/DL (ref 0.6–1.2)
EKG ATRIAL RATE: 67 BPM
EKG DIAGNOSIS: NORMAL
EKG P AXIS: 54 DEGREES
EKG P-R INTERVAL: 204 MS
EKG Q-T INTERVAL: 420 MS
EKG QRS DURATION: 82 MS
EKG QTC CALCULATION (BAZETT): 443 MS
EKG R AXIS: 41 DEGREES
EKG T AXIS: 59 DEGREES
EKG VENTRICULAR RATE: 67 BPM
EOSINOPHILS ABSOLUTE: 0 K/UL (ref 0–0.6)
EOSINOPHILS RELATIVE PERCENT: 0.4 %
GFR AFRICAN AMERICAN: >60
GFR NON-AFRICAN AMERICAN: >60
GLOBULIN: 3.4 G/DL
GLUCOSE BLD-MCNC: 100 MG/DL (ref 70–99)
GLUCOSE BLD-MCNC: 106 MG/DL (ref 70–99)
HCT VFR BLD CALC: 39.5 % (ref 36–48)
HEMOGLOBIN: 13.3 G/DL (ref 12–16)
INR BLD: 1.2 (ref 0.86–1.14)
INR BLD: ABNORMAL (ref 0.86–1.14)
LYMPHOCYTES ABSOLUTE: 1.4 K/UL (ref 1–5.1)
LYMPHOCYTES RELATIVE PERCENT: 22 %
MCH RBC QN AUTO: 32.6 PG (ref 26–34)
MCHC RBC AUTO-ENTMCNC: 33.7 G/DL (ref 31–36)
MCV RBC AUTO: 96.8 FL (ref 80–100)
MONOCYTES ABSOLUTE: 0.4 K/UL (ref 0–1.3)
MONOCYTES RELATIVE PERCENT: 6.5 %
NEUTROPHILS ABSOLUTE: 4.4 K/UL (ref 1.7–7.7)
NEUTROPHILS RELATIVE PERCENT: 70.4 %
PDW BLD-RTO: 12.9 % (ref 12.4–15.4)
PERFORMED ON: ABNORMAL
PLATELET # BLD: 250 K/UL (ref 135–450)
PLATELET SLIDE REVIEW: NORMAL
PMV BLD AUTO: 8.4 FL (ref 5–10.5)
POTASSIUM REFLEX MAGNESIUM: 4.7 MMOL/L (ref 3.5–5.1)
PRO-BNP: 713 PG/ML (ref 0–449)
PROTHROMBIN TIME: 13.7 SEC (ref 9.8–13)
PROTHROMBIN TIME: ABNORMAL SEC (ref 9.8–13)
RBC # BLD: 4.08 M/UL (ref 4–5.2)
REASON FOR REJECTION: NORMAL
REJECTED TEST: NORMAL
SODIUM BLD-SCNC: 139 MMOL/L (ref 136–145)
TOTAL PROTEIN: 7.9 G/DL (ref 6.4–8.2)
TROPONIN: <0.01 NG/ML
WBC # BLD: 6.3 K/UL (ref 4–11)

## 2019-01-03 PROCEDURE — 80053 COMPREHEN METABOLIC PANEL: CPT

## 2019-01-03 PROCEDURE — 6370000000 HC RX 637 (ALT 250 FOR IP): Performed by: FAMILY MEDICINE

## 2019-01-03 PROCEDURE — 85730 THROMBOPLASTIN TIME PARTIAL: CPT

## 2019-01-03 PROCEDURE — G0378 HOSPITAL OBSERVATION PER HR: HCPCS

## 2019-01-03 PROCEDURE — 2580000003 HC RX 258: Performed by: EMERGENCY MEDICINE

## 2019-01-03 PROCEDURE — 85610 PROTHROMBIN TIME: CPT

## 2019-01-03 PROCEDURE — 99285 EMERGENCY DEPT VISIT HI MDM: CPT

## 2019-01-03 PROCEDURE — 84484 ASSAY OF TROPONIN QUANT: CPT

## 2019-01-03 PROCEDURE — 85025 COMPLETE CBC W/AUTO DIFF WBC: CPT

## 2019-01-03 PROCEDURE — 96374 THER/PROPH/DIAG INJ IV PUSH: CPT

## 2019-01-03 PROCEDURE — 2500000003 HC RX 250 WO HCPCS: Performed by: EMERGENCY MEDICINE

## 2019-01-03 PROCEDURE — 70450 CT HEAD/BRAIN W/O DYE: CPT

## 2019-01-03 PROCEDURE — 93005 ELECTROCARDIOGRAM TRACING: CPT | Performed by: EMERGENCY MEDICINE

## 2019-01-03 PROCEDURE — 2060000000 HC ICU INTERMEDIATE R&B

## 2019-01-03 PROCEDURE — 36415 COLL VENOUS BLD VENIPUNCTURE: CPT

## 2019-01-03 PROCEDURE — 83880 ASSAY OF NATRIURETIC PEPTIDE: CPT

## 2019-01-03 PROCEDURE — 84443 ASSAY THYROID STIM HORMONE: CPT

## 2019-01-03 PROCEDURE — 93010 ELECTROCARDIOGRAM REPORT: CPT | Performed by: INTERNAL MEDICINE

## 2019-01-03 PROCEDURE — 71045 X-RAY EXAM CHEST 1 VIEW: CPT

## 2019-01-03 PROCEDURE — 6360000002 HC RX W HCPCS: Performed by: FAMILY MEDICINE

## 2019-01-03 RX ORDER — FLUDROCORTISONE ACETATE 0.1 MG/1
0.1 TABLET ORAL DAILY
Status: DISCONTINUED | OUTPATIENT
Start: 2019-01-03 | End: 2019-01-04 | Stop reason: HOSPADM

## 2019-01-03 RX ORDER — MULTIVITAMIN WITH FOLIC ACID 400 MCG
1 TABLET ORAL DAILY
Status: DISCONTINUED | OUTPATIENT
Start: 2019-01-03 | End: 2019-01-04 | Stop reason: HOSPADM

## 2019-01-03 RX ORDER — SODIUM CHLORIDE 0.9 % (FLUSH) 0.9 %
10 SYRINGE (ML) INJECTION EVERY 12 HOURS SCHEDULED
Status: DISCONTINUED | OUTPATIENT
Start: 2019-01-03 | End: 2019-01-04 | Stop reason: HOSPADM

## 2019-01-03 RX ORDER — FLECAINIDE ACETATE 100 MG/1
50 TABLET ORAL EVERY 12 HOURS SCHEDULED
Status: DISCONTINUED | OUTPATIENT
Start: 2019-01-03 | End: 2019-01-04 | Stop reason: HOSPADM

## 2019-01-03 RX ORDER — DONEPEZIL HYDROCHLORIDE 5 MG/1
10 TABLET, FILM COATED ORAL NIGHTLY
Status: DISCONTINUED | OUTPATIENT
Start: 2019-01-03 | End: 2019-01-04 | Stop reason: HOSPADM

## 2019-01-03 RX ORDER — 0.9 % SODIUM CHLORIDE 0.9 %
500 INTRAVENOUS SOLUTION INTRAVENOUS ONCE
Status: COMPLETED | OUTPATIENT
Start: 2019-01-03 | End: 2019-01-03

## 2019-01-03 RX ORDER — ACETAMINOPHEN 500 MG
500 TABLET ORAL EVERY 8 HOURS PRN
Status: DISCONTINUED | OUTPATIENT
Start: 2019-01-03 | End: 2019-01-04 | Stop reason: HOSPADM

## 2019-01-03 RX ORDER — MEGESTROL ACETATE 40 MG/ML
200 SUSPENSION ORAL DAILY
Status: DISCONTINUED | OUTPATIENT
Start: 2019-01-03 | End: 2019-01-04 | Stop reason: HOSPADM

## 2019-01-03 RX ORDER — ATORVASTATIN CALCIUM 40 MG/1
40 TABLET, FILM COATED ORAL NIGHTLY
Status: DISCONTINUED | OUTPATIENT
Start: 2019-01-03 | End: 2019-01-04 | Stop reason: HOSPADM

## 2019-01-03 RX ORDER — LANOLIN ALCOHOL/MO/W.PET/CERES
1000 CREAM (GRAM) TOPICAL DAILY
Status: DISCONTINUED | OUTPATIENT
Start: 2019-01-03 | End: 2019-01-04 | Stop reason: HOSPADM

## 2019-01-03 RX ORDER — FERROUS SULFATE 325(65) MG
325 TABLET ORAL
Status: DISCONTINUED | OUTPATIENT
Start: 2019-01-04 | End: 2019-01-04 | Stop reason: HOSPADM

## 2019-01-03 RX ORDER — SODIUM CHLORIDE 9 MG/ML
INJECTION, SOLUTION INTRAVENOUS CONTINUOUS
Status: DISCONTINUED | OUTPATIENT
Start: 2019-01-03 | End: 2019-01-03

## 2019-01-03 RX ORDER — HYDRALAZINE HYDROCHLORIDE 20 MG/ML
10 INJECTION INTRAMUSCULAR; INTRAVENOUS EVERY 6 HOURS PRN
Status: DISCONTINUED | OUTPATIENT
Start: 2019-01-03 | End: 2019-01-04 | Stop reason: HOSPADM

## 2019-01-03 RX ORDER — ALBUTEROL SULFATE 90 UG/1
1 AEROSOL, METERED RESPIRATORY (INHALATION) EVERY 4 HOURS PRN
Status: DISCONTINUED | OUTPATIENT
Start: 2019-01-03 | End: 2019-01-04 | Stop reason: HOSPADM

## 2019-01-03 RX ORDER — NITROGLYCERIN 0.4 MG/1
0.4 TABLET SUBLINGUAL EVERY 5 MIN PRN
Status: DISCONTINUED | OUTPATIENT
Start: 2019-01-03 | End: 2019-01-04 | Stop reason: HOSPADM

## 2019-01-03 RX ORDER — LABETALOL HYDROCHLORIDE 5 MG/ML
20 INJECTION, SOLUTION INTRAVENOUS ONCE
Status: COMPLETED | OUTPATIENT
Start: 2019-01-03 | End: 2019-01-03

## 2019-01-03 RX ORDER — SODIUM CHLORIDE 0.9 % (FLUSH) 0.9 %
10 SYRINGE (ML) INJECTION PRN
Status: DISCONTINUED | OUTPATIENT
Start: 2019-01-03 | End: 2019-01-04 | Stop reason: HOSPADM

## 2019-01-03 RX ORDER — ONDANSETRON 2 MG/ML
4 INJECTION INTRAMUSCULAR; INTRAVENOUS EVERY 6 HOURS PRN
Status: DISCONTINUED | OUTPATIENT
Start: 2019-01-03 | End: 2019-01-04 | Stop reason: HOSPADM

## 2019-01-03 RX ORDER — MIDODRINE HYDROCHLORIDE 5 MG/1
5 TABLET ORAL EVERY MORNING
Status: DISCONTINUED | OUTPATIENT
Start: 2019-01-04 | End: 2019-01-04

## 2019-01-03 RX ORDER — ASPIRIN 81 MG/1
81 TABLET ORAL DAILY
Status: DISCONTINUED | OUTPATIENT
Start: 2019-01-03 | End: 2019-01-04 | Stop reason: HOSPADM

## 2019-01-03 RX ORDER — LEVOTHYROXINE SODIUM 0.03 MG/1
50 TABLET ORAL DAILY
Status: DISCONTINUED | OUTPATIENT
Start: 2019-01-04 | End: 2019-01-04

## 2019-01-03 RX ORDER — ACETAMINOPHEN 80 MG
TABLET,CHEWABLE ORAL
Status: COMPLETED
Start: 2019-01-03 | End: 2019-01-04

## 2019-01-03 RX ADMIN — SODIUM CHLORIDE: 9 INJECTION, SOLUTION INTRAVENOUS at 13:44

## 2019-01-03 RX ADMIN — TIOTROPIUM BROMIDE 18 MCG: 18 CAPSULE ORAL; RESPIRATORY (INHALATION) at 17:30

## 2019-01-03 RX ADMIN — DONEPEZIL HYDROCHLORIDE 10 MG: 5 TABLET, FILM COATED ORAL at 21:47

## 2019-01-03 RX ADMIN — LEVETIRACETAM 750 MG: 500 TABLET ORAL at 21:47

## 2019-01-03 RX ADMIN — HYDRALAZINE HYDROCHLORIDE 10 MG: 20 INJECTION INTRAMUSCULAR; INTRAVENOUS at 22:12

## 2019-01-03 RX ADMIN — ASPIRIN 81 MG: 81 TABLET, COATED ORAL at 18:07

## 2019-01-03 RX ADMIN — SODIUM CHLORIDE 500 ML: 9 INJECTION, SOLUTION INTRAVENOUS at 13:43

## 2019-01-03 RX ADMIN — FLECAINIDE ACETATE 50 MG: 100 TABLET ORAL at 21:47

## 2019-01-03 RX ADMIN — ACETAMINOPHEN 500 MG: 500 TABLET, FILM COATED ORAL at 18:10

## 2019-01-03 RX ADMIN — RIVAROXABAN 15 MG: 15 TABLET, FILM COATED ORAL at 18:08

## 2019-01-03 RX ADMIN — DESMOPRESSIN ACETATE 40 MG: 0.2 TABLET ORAL at 21:47

## 2019-01-03 RX ADMIN — LABETALOL HYDROCHLORIDE 10 MG: 5 INJECTION INTRAVENOUS at 13:14

## 2019-01-03 ASSESSMENT — PAIN SCALES - GENERAL
PAINLEVEL_OUTOF10: 2
PAINLEVEL_OUTOF10: 6
PAINLEVEL_OUTOF10: 5
PAINLEVEL_OUTOF10: 3
PAINLEVEL_OUTOF10: 5
PAINLEVEL_OUTOF10: 4

## 2019-01-03 ASSESSMENT — PAIN DESCRIPTION - PAIN TYPE
TYPE: ACUTE PAIN
TYPE: ACUTE PAIN

## 2019-01-03 ASSESSMENT — PAIN DESCRIPTION - LOCATION
LOCATION: HEAD
LOCATION: HEAD

## 2019-01-04 ENCOUNTER — TELEPHONE (OUTPATIENT)
Dept: CARDIOLOGY CLINIC | Age: 84
End: 2019-01-04

## 2019-01-04 ENCOUNTER — APPOINTMENT (OUTPATIENT)
Dept: MRI IMAGING | Age: 84
DRG: 305 | End: 2019-01-04
Payer: MEDICARE

## 2019-01-04 VITALS
HEART RATE: 70 BPM | RESPIRATION RATE: 16 BRPM | WEIGHT: 119 LBS | HEIGHT: 61 IN | BODY MASS INDEX: 22.47 KG/M2 | OXYGEN SATURATION: 96 % | SYSTOLIC BLOOD PRESSURE: 158 MMHG | DIASTOLIC BLOOD PRESSURE: 72 MMHG | TEMPERATURE: 97 F

## 2019-01-04 LAB
ANION GAP SERPL CALCULATED.3IONS-SCNC: 12 MMOL/L (ref 3–16)
BUN BLDV-MCNC: 10 MG/DL (ref 7–20)
CALCIUM SERPL-MCNC: 9.3 MG/DL (ref 8.3–10.6)
CHLORIDE BLD-SCNC: 108 MMOL/L (ref 99–110)
CHOLESTEROL, TOTAL: 178 MG/DL (ref 0–199)
CO2: 23 MMOL/L (ref 21–32)
CREAT SERPL-MCNC: 0.6 MG/DL (ref 0.6–1.2)
GFR AFRICAN AMERICAN: >60
GFR NON-AFRICAN AMERICAN: >60
GLUCOSE BLD-MCNC: 97 MG/DL (ref 70–99)
HCT VFR BLD CALC: 32.9 % (ref 36–48)
HDLC SERPL-MCNC: 54 MG/DL (ref 40–60)
HEMOGLOBIN: 11.3 G/DL (ref 12–16)
LDL CHOLESTEROL CALCULATED: 105 MG/DL
LEFT VENTRICULAR EJECTION FRACTION HIGH VALUE: 70 %
LEFT VENTRICULAR EJECTION FRACTION MODE: NORMAL
LV EF: 70 %
LVEF MODALITY: NORMAL
MAGNESIUM: 2.1 MG/DL (ref 1.8–2.4)
MCH RBC QN AUTO: 32.5 PG (ref 26–34)
MCHC RBC AUTO-ENTMCNC: 34.2 G/DL (ref 31–36)
MCV RBC AUTO: 95 FL (ref 80–100)
PDW BLD-RTO: 13.1 % (ref 12.4–15.4)
PLATELET # BLD: 265 K/UL (ref 135–450)
PMV BLD AUTO: 7.1 FL (ref 5–10.5)
POTASSIUM REFLEX MAGNESIUM: 3.4 MMOL/L (ref 3.5–5.1)
RBC # BLD: 3.47 M/UL (ref 4–5.2)
SODIUM BLD-SCNC: 143 MMOL/L (ref 136–145)
TRIGL SERPL-MCNC: 97 MG/DL (ref 0–150)
TSH REFLEX: 3.61 UIU/ML (ref 0.27–4.2)
VLDLC SERPL CALC-MCNC: 19 MG/DL
WBC # BLD: 4.4 K/UL (ref 4–11)

## 2019-01-04 PROCEDURE — 97165 OT EVAL LOW COMPLEX 30 MIN: CPT

## 2019-01-04 PROCEDURE — 92523 SPEECH SOUND LANG COMPREHEN: CPT

## 2019-01-04 PROCEDURE — G0378 HOSPITAL OBSERVATION PER HR: HCPCS

## 2019-01-04 PROCEDURE — 36415 COLL VENOUS BLD VENIPUNCTURE: CPT

## 2019-01-04 PROCEDURE — 6370000000 HC RX 637 (ALT 250 FOR IP): Performed by: FAMILY MEDICINE

## 2019-01-04 PROCEDURE — 80061 LIPID PANEL: CPT

## 2019-01-04 PROCEDURE — 93306 TTE W/DOPPLER COMPLETE: CPT

## 2019-01-04 PROCEDURE — 80048 BASIC METABOLIC PNL TOTAL CA: CPT

## 2019-01-04 PROCEDURE — 94760 N-INVAS EAR/PLS OXIMETRY 1: CPT

## 2019-01-04 PROCEDURE — 83735 ASSAY OF MAGNESIUM: CPT

## 2019-01-04 PROCEDURE — 70551 MRI BRAIN STEM W/O DYE: CPT

## 2019-01-04 PROCEDURE — 99223 1ST HOSP IP/OBS HIGH 75: CPT | Performed by: PSYCHIATRY & NEUROLOGY

## 2019-01-04 PROCEDURE — G8979 MOBILITY GOAL STATUS: HCPCS

## 2019-01-04 PROCEDURE — 93280 PM DEVICE PROGR EVAL DUAL: CPT | Performed by: INTERNAL MEDICINE

## 2019-01-04 PROCEDURE — 99223 1ST HOSP IP/OBS HIGH 75: CPT | Performed by: INTERNAL MEDICINE

## 2019-01-04 PROCEDURE — 97161 PT EVAL LOW COMPLEX 20 MIN: CPT

## 2019-01-04 PROCEDURE — G8978 MOBILITY CURRENT STATUS: HCPCS

## 2019-01-04 PROCEDURE — 92610 EVALUATE SWALLOWING FUNCTION: CPT

## 2019-01-04 PROCEDURE — 2580000003 HC RX 258: Performed by: FAMILY MEDICINE

## 2019-01-04 PROCEDURE — 85027 COMPLETE CBC AUTOMATED: CPT

## 2019-01-04 PROCEDURE — G8980 MOBILITY D/C STATUS: HCPCS

## 2019-01-04 PROCEDURE — 92526 ORAL FUNCTION THERAPY: CPT

## 2019-01-04 PROCEDURE — 94640 AIRWAY INHALATION TREATMENT: CPT

## 2019-01-04 RX ORDER — MIDODRINE HYDROCHLORIDE 5 MG/1
2.5 TABLET ORAL EVERY MORNING
Status: DISCONTINUED | OUTPATIENT
Start: 2019-01-05 | End: 2019-01-04 | Stop reason: HOSPADM

## 2019-01-04 RX ORDER — LEVOTHYROXINE SODIUM 0.03 MG/1
50 TABLET ORAL DAILY
Status: DISCONTINUED | OUTPATIENT
Start: 2019-01-04 | End: 2019-01-04 | Stop reason: HOSPADM

## 2019-01-04 RX ORDER — MIDODRINE HYDROCHLORIDE 5 MG/1
5 TABLET ORAL DAILY PRN
Qty: 90 TABLET | Refills: 3 | Status: SHIPPED | OUTPATIENT
Start: 2019-01-04 | End: 2019-01-01 | Stop reason: SDUPTHER

## 2019-01-04 RX ADMIN — FERROUS SULFATE TAB 325 MG (65 MG ELEMENTAL FE) 325 MG: 325 (65 FE) TAB at 09:51

## 2019-01-04 RX ADMIN — FLECAINIDE ACETATE 50 MG: 100 TABLET ORAL at 09:49

## 2019-01-04 RX ADMIN — MIDODRINE HYDROCHLORIDE 5 MG: 5 TABLET ORAL at 09:50

## 2019-01-04 RX ADMIN — Medication: at 00:42

## 2019-01-04 RX ADMIN — LEVOTHYROXINE SODIUM 50 MCG: 25 TABLET ORAL at 09:49

## 2019-01-04 RX ADMIN — ACETAMINOPHEN 500 MG: 500 TABLET, FILM COATED ORAL at 15:17

## 2019-01-04 RX ADMIN — LEVETIRACETAM 750 MG: 500 TABLET ORAL at 09:48

## 2019-01-04 RX ADMIN — Medication 10 ML: at 09:52

## 2019-01-04 RX ADMIN — THERA TABS 1 TABLET: TAB at 09:48

## 2019-01-04 RX ADMIN — ASPIRIN 81 MG: 81 TABLET, COATED ORAL at 09:48

## 2019-01-04 RX ADMIN — ACETAMINOPHEN 500 MG: 500 TABLET, FILM COATED ORAL at 05:36

## 2019-01-04 RX ADMIN — MEGESTROL ACETATE 200 MG: 40 SUSPENSION ORAL at 09:48

## 2019-01-04 RX ADMIN — SERTRALINE 100 MG: 50 TABLET, FILM COATED ORAL at 09:50

## 2019-01-04 RX ADMIN — CYANOCOBALAMIN TAB 1000 MCG 1000 MCG: 1000 TAB at 09:51

## 2019-01-04 RX ADMIN — TIOTROPIUM BROMIDE 18 MCG: 18 CAPSULE ORAL; RESPIRATORY (INHALATION) at 08:58

## 2019-01-04 ASSESSMENT — PAIN SCALES - GENERAL
PAINLEVEL_OUTOF10: 0
PAINLEVEL_OUTOF10: 2
PAINLEVEL_OUTOF10: 7
PAINLEVEL_OUTOF10: 2

## 2019-01-04 ASSESSMENT — PAIN DESCRIPTION - LOCATION
LOCATION: HEAD

## 2019-01-04 ASSESSMENT — PAIN DESCRIPTION - PAIN TYPE
TYPE: ACUTE PAIN

## 2019-01-05 ENCOUNTER — CARE COORDINATION (OUTPATIENT)
Dept: CASE MANAGEMENT | Age: 84
End: 2019-01-05

## 2019-01-05 DIAGNOSIS — I16.1 HYPERTENSIVE EMERGENCY: Primary | ICD-10-CM

## 2019-01-05 PROCEDURE — 1111F DSCHRG MED/CURRENT MED MERGE: CPT | Performed by: FAMILY MEDICINE

## 2019-01-09 ENCOUNTER — TELEPHONE (OUTPATIENT)
Dept: FAMILY MEDICINE CLINIC | Age: 84
End: 2019-01-09

## 2019-01-10 ENCOUNTER — CARE COORDINATION (OUTPATIENT)
Dept: CASE MANAGEMENT | Age: 84
End: 2019-01-10

## 2019-01-11 ENCOUNTER — HOSPITAL ENCOUNTER (OUTPATIENT)
Age: 84
Discharge: HOME | End: 2019-01-11
Payer: MEDICARE

## 2019-01-11 ENCOUNTER — HOSPITAL ENCOUNTER (OUTPATIENT)
Dept: GENERAL RADIOLOGY | Age: 84
Discharge: HOME | End: 2019-01-11
Payer: MEDICARE

## 2019-01-11 ENCOUNTER — OFFICE VISIT (OUTPATIENT)
Dept: FAMILY MEDICINE CLINIC | Age: 84
End: 2019-01-11
Payer: MEDICARE

## 2019-01-11 VITALS
DIASTOLIC BLOOD PRESSURE: 62 MMHG | OXYGEN SATURATION: 95 % | HEART RATE: 67 BPM | BODY MASS INDEX: 21.9 KG/M2 | SYSTOLIC BLOOD PRESSURE: 120 MMHG | WEIGHT: 116 LBS

## 2019-01-11 DIAGNOSIS — R05.9 COUGH: ICD-10-CM

## 2019-01-11 DIAGNOSIS — R07.9 CHEST PAIN, UNSPECIFIED TYPE: Primary | ICD-10-CM

## 2019-01-11 DIAGNOSIS — R07.9 CHEST PAIN, UNSPECIFIED TYPE: ICD-10-CM

## 2019-01-11 DIAGNOSIS — R60.9 PERIPHERAL EDEMA: ICD-10-CM

## 2019-01-11 DIAGNOSIS — I95.1 ORTHOSTATIC HYPOTENSION: ICD-10-CM

## 2019-01-11 PROCEDURE — 99214 OFFICE O/P EST MOD 30 MIN: CPT | Performed by: FAMILY MEDICINE

## 2019-01-11 PROCEDURE — 71046 X-RAY EXAM CHEST 2 VIEWS: CPT

## 2019-01-11 RX ORDER — DONEPEZIL HYDROCHLORIDE 10 MG/1
10 TABLET, FILM COATED ORAL NIGHTLY
Qty: 30 TABLET | Refills: 5 | Status: ON HOLD | OUTPATIENT
Start: 2019-01-11 | End: 2019-01-01 | Stop reason: HOSPADM

## 2019-01-16 RX ORDER — RIVAROXABAN 15 MG/1
TABLET, FILM COATED ORAL
Qty: 90 TABLET | Refills: 3 | Status: SHIPPED | OUTPATIENT
Start: 2019-01-16 | End: 2019-01-01

## 2019-03-18 NOTE — TELEPHONE ENCOUNTER
Chandra Garcia is a NP with [address]. She is calling to give RK her contact information incase she does have any questions. She will be seeing patient once monthly.

## 2019-03-30 PROBLEM — R55 SYNCOPE AND COLLAPSE: Status: ACTIVE | Noted: 2019-01-01

## 2019-03-30 PROBLEM — R94.31 ABNORMAL EKG: Status: ACTIVE | Noted: 2019-01-01

## 2019-03-30 PROBLEM — R11.2 NAUSEA AND VOMITING: Status: ACTIVE | Noted: 2019-01-01

## 2019-03-30 NOTE — TELEPHONE ENCOUNTER
FYI    Patient had to cancel appt on 3/30/19 due calling 911.   Patient blood pressure was 54/41 with a wet cough and pain in chest.

## 2019-03-30 NOTE — H&P
elsewhere     Pacemaker     Paroxysmal atrial fibrillation (HCC)     Partial epilepsy, with intractable epilepsy, pharmacoresistant ([facility] [address]     Personal history of malignant neoplasm of cervix uteri 6/6/2013    Personal history of PE (pulmonary embolism)     post surgical    Pneumonia     Post traumatic seizure ([facility] [address]     Seizures ([facility] [address] [facility]     Sinus node dysfunction ([facility] [address] [facility]     Spinal stenosis of lumbar region        Past Surgical History:        Procedure Laterality Date    COLONOSCOPY  04/2013    normal except diverticulosis    PACEMAKER PLACEMENT Left     MRI compatible    PACEMAKER PLACEMENT Left     ROTATOR CUFF REPAIR Right     ROLAND AND BSO  9/2010    cervical cancer, Dr. Luly Link ENDOSCOPY  02/2016    normal       Allergies:  Cymbalta [duloxetine hcl] and Sulfa antibiotics    Medications Prior to Admission:    Prior to Admission medications    Medication Sig Start Date End Date Taking?  Authorizing Provider   midodrine (PROAMATINE) 5 MG tablet Take 1 tablet by mouth daily as needed (SBP < 100) 3/21/19  Yes Jeramy Leal MD   fludrocortisone (FLORINEF) 0.1 MG tablet Take 1 tablet by mouth daily 3/15/19 4/14/19 Yes Andrea Rosas MD   nystatin (MYCOSTATIN) 626174 UNIT/ML suspension TAKE 5 MLS BY MOUTH 4 TIMES DAILY 1/18/19  Yes Andrea Rosas MD   XARELTO 15 MG TABS tablet TAKE 1 TABLET BY MOUTH EVERY DAY WITH LARGE MEAL OF THE DAY 1/16/19  Yes YOUSUF Quintanilla CNP   donepezil (ARICEPT) 10 MG tablet Take 1 tablet by mouth nightly 1/11/19  Yes Andrea Rosas MD   metoprolol tartrate (LOPRESSOR) 25 MG tablet Take 1 tablet by mouth 3 times daily as needed (systolic BP in right arm above 160) 1/4/19  Yes Mario Shah MD   ferrous sulfate 325 (65 Fe) MG tablet TAKE 1 TABLET BY MOUTH EVERY DAY WITH BREAKFAST 11/12/18  Yes Andrea Rosas MD   levalbuterol (XOPENEX) 0.63 MG/3ML nebulization Take 3 mLs by nebulization every 4 hours as needed for Wheezing 10/11/18  Yes Gina Currie MD   flecainide (TAMBOCOR) 50 MG tablet Take 1 tablet by mouth every 12 hours 9/7/18  Yes Roel Guerrero MD   sertraline (ZOLOFT) 100 MG tablet Take 1 tablet by mouth daily 9/7/18  Yes Roel Guerrero MD   megestrol (MEGACE) 40 MG/ML suspension Take 5 mLs by mouth daily 9/7/18  Yes Roel Guerrero MD   tiotropium (SPIRIVA RESPIMAT) 2.5 MCG/ACT AERS inhaler Inhale 2 puffs into the lungs daily 9/7/18  Yes Roel Guerrero MD   nitroGLYCERIN (NITROSTAT) 0.4 MG SL tablet up to max of 3 total doses. If no relief after 1 dose, call 911. 7/2/18  Yes Lucy Carlson MD   levothyroxine (SYNTHROID) 50 MCG tablet TAKE 1 TABLET BY MOUTH DAILY 6/28/18  Yes Roel Guerrero MD   Cranberry 1000 MG CAPS Take 2 capsules by mouth daily    Yes Historical Provider, MD   levETIRAcetam (KEPPRA) 750 MG tablet Take 1 tablet by mouth 2 times daily 7/25/17  Yes Roel Guerrero MD   vitamin B-12 (CYANOCOBALAMIN) 1000 MCG tablet Take 1 tablet by mouth daily 6/1/16  Yes Silvio Sena MD   multivitamin [facility]) per tablet Take 1 tablet by mouth daily. Yes Historical Provider, MD   levalbuterol Saldana Roel HFA) 45 MCG/ACT inhaler Inhale 1 puff into the lungs every 4 hours as needed for Wheezing 3/19/18 3/19/19  Gina Currie MD       Family History:       Problem Relation Age of Onset    Stroke Mother     Parkinsonism Mother     High Blood Pressure Mother     Cancer Daughter         breast    Other Father         black lung    Lung Cancer Brother     Breast Cancer Maternal Aunt     Heart Disease Neg Hx     High Cholesterol Neg Hx      Social History:   TOBACCO:   reports that she has never smoked. She has never used smokeless tobacco.  ETOH:   reports that she does not drink alcohol.   OCCUPATION:      REVIEW OF SYSTEMS:  A full review of systems was performed and is negative except for that which appears in the HPI    Physical Exam:    Vitals: /71   Pulse 81   Temp 98.8 °F RBCUA 1 04/18/2018    SPECGRAV 1.025 03/30/2019    UROBILINOGEN Normal 03/30/2019    BILIRUBINUR 0 03/30/2019    BILIRUBINUR Negative 06/14/2018    BLOODU Positive 03/30/2019    GLUCOSEU Negative 03/30/2019    PROTEINU Negative 03/30/2019         RAD:   I have independently reviewed and interpreted the imaging studies below and based my recommendations to the patient on those findings. Xr Chest Standard (2 Vw)    Result Date: 3/30/2019  EXAMINATION: TWO VIEWS OF THE CHEST 3/30/2019 9:18 am COMPARISON: 03/07/2019 HISTORY: ORDERING SYSTEM PROVIDED HISTORY: Chest Discomfort TECHNOLOGIST PROVIDED HISTORY: Reason for exam:->Chest Discomfort Ordering Physician Provided Reason for Exam: Pt states pill got stuck in throat last night. Woke up with burning in throat and cough. Acuity: Acute Type of Exam: Initial FINDINGS: There is biapical and bibasilar scarring. The lungs are hyperexpanded. The cardiac silhouette is within normal limits status post dual lead pacemaker. There is no pneumothorax or pleural effusion     1. No acute abnormality. Xr Chest Standard (2 Vw)    Result Date: 3/7/2019  EXAMINATION: TWO VIEWS OF THE CHEST 3/7/2019 11:01 pm COMPARISON: Chest radiographs 02/10/2019, 01/11/2019 HISTORY: ORDERING SYSTEM PROVIDED HISTORY: eval for dyspna TECHNOLOGIST PROVIDED HISTORY: Reason for exam:->eval for dyspna Ordering Physician Provided Reason for Exam: Eval for dyspnea Acuity: Acute Type of Exam: Initial FINDINGS: Hyperinflated lungs. Biapical pleuroparenchymal scarring. No pneumothorax or pleural effusion. No focal airspace consolidation. Normal heart size and mediastinal contours. Intact dual lead pacemaker with left chest generator. Thoracic kyphoscoliosis with spondylosis. Bones are demineralized. No acute osseous abnormality. COPD. No focal airspace disease.      EKG:   Read by ER in the absence of a Cardiologist shows sinus rhythm at a rate of 77 beats a minute with some bilateral ST depressions in the precordial leads. These are new compared to the earlier EKG and other EKGs on record. Assessment:   Principal Problem:    Syncope and collapse  Active Problems:    COPD (chronic obstructive pulmonary disease) (HCC)    Essential hypertension    Hypothyroidism    Paroxysmal atrial fibrillation (HCC)    Hypokalemia    Nausea and vomiting    Abnormal EKG  Resolved Problems:    * No resolved hospital problems. *      Plan:       Syncope and collapse - etiology unclear. Pt very anxious. Possibly Vaso Vagal due to her anxiety or having emesis. Had Echocardiogram on 01/04/2019, I won't order another at this time. Monitor on Telemetry and consult Cardiology. Non-Intractable vomiting with nausea - Will provide symptomatic treatment with Zofran as needed, maintenance of fluids and electrolytes. Abnormal EKG - ST depressions in the precordial leads. Pt denies chest pain; will monitor on Telemetry and follow serial cardiac enzymes    Hypokalemia - severe; replace Potassium and recheck later this evening    Acquired hypothyroidism - stable; continue Levothyroxine    Paroxysmal atrial fibrillation (HCC) - currently in sinus rhythm; will continue Metoprolol, Fl;ecainide and Xarelto    COPD (chronic obstructive pulmonary disease) (HCC) - stable without exacerbation. Monitor    Essential (primary) hypertension - continue home meds and monitor blood pressure          DVT Prophylaxis: Xarelto  Diet: DIET GENERAL;  Code Status: Full Code  (Advanced care planning has been discussed with patient and/or responsible family member and is reflected in the code status. Further orders associated with this have been entered if appropriate)    Disposition: Anticipate that patient will remain in the hospital for 2 to 3 days depending on further evaluation and clinical course.      Irma Martinez MD

## 2019-03-30 NOTE — ED PROVIDER NOTES
abnormality. Xr Chest Standard (2 Vw)    Result Date: 3/7/2019  EXAMINATION: TWO VIEWS OF THE CHEST 3/7/2019 11:01 pm COMPARISON: Chest radiographs 02/10/2019, 01/11/2019 HISTORY: ORDERING SYSTEM PROVIDED HISTORY: eval for dyspna TECHNOLOGIST PROVIDED HISTORY: Reason for exam:->eval for dyspna Ordering Physician Provided Reason for Exam: Eval for dyspnea Acuity: Acute Type of Exam: Initial FINDINGS: Hyperinflated lungs. Biapical pleuroparenchymal scarring. No pneumothorax or pleural effusion. No focal airspace consolidation. Normal heart size and mediastinal contours. Intact dual lead pacemaker with left chest generator. Thoracic kyphoscoliosis with spondylosis. Bones are demineralized. No acute osseous abnormality. COPD. No focal airspace disease.          Linsey Khan MD  03/30/19 [id number]

## 2019-03-30 NOTE — ED NOTES
Pt complains of bilateral arm pain that just started. EKG ordered.       Wells Claude, RN  03/30/19 [id number]

## 2019-03-30 NOTE — ED NOTES
Pt checked in via [facility]. Alert and oriented. Multiple complaints each time this RN enters room a new ailment is brought to my attention. Daughter states worried only about uti- MD made aware. Had appt with pcp today for urinary symptoms but cancelled and came into ED for low bp- which family states is chronic and they use midodrine to treat. Lungs CTAB. EKG obtained.       Sabas Aden RN  03/30/19 [id number]

## 2019-03-30 NOTE — ED PROVIDER NOTES
[address] [facility]  eMERGENCY dEPARTMENTeNCOUnter      Pt Name: Lawrence Garcia  MRN: [id number]  [place]ir[place] 9/7/1931  Date ofevaluation: 3/30/2019  Provider: Johanny Butt MD    CHIEF COMPLAINT       Chief Complaint   Patient presents with    Loss of Consciousness     pt recently d/c from ER for UTI. pt to PCP and had 3 syncopal episodes. PCP states pt needs to be in hospital.          HISTORY OF PRESENT ILLNESS   (Location/Symptom, Timing/Onset,Context/Setting, Quality, Duration, Modifying Factors, Severity)  Note limiting factors. Lawrence Garcia is a 80 y.o. female who presents to the emergency department after syncopal episode. The patient was at her primary care physician's office when she apparently passed out 3 times. On her states that the second time she completely passed out. The time she felt some abdominal cramping but denies any chest pain or diaphoresis. She has a history of this and has done this in the past.  The patient was here earlier with some swallowing difficulties and hypotension with an otherwise an EKG was obtained earlier that showed a sinus rhythm with frequent PVCs. HPI    NursingNotes were reviewed. REVIEW OF SYSTEMS    (2-9 systems for level 4, 10 or more for level 5)     Review of Systems    General Appearance:  Alert, cooperative, no distress, appears stated age. Head:  Normocephalic, without obvious abnormality, atraumatic. Eyes:  conjunctiva/corneas clear, EOM's intact. Sclera anicteric. ENT: Mucous membranes moist.   Neck: Supple, symmetrical, trachea midline, no adenopathy. No jugular venous distention. Lungs:   No Respiratory Distress. Chest Wall:     Heart:  Regular rate rhythm with no murmurs or gallops    Abdomen:   Soft and benign with no pulsatile masses. Extremities:  Full range of motion. Pulses: Good throughout    Skin:  No rashes or lesions to exposed skin. Neurologic: Alert and oriented X 3.    Motor daily    LEVALBUTEROL (XOPENEX HFA) 45 MCG/ACT INHALER    Inhale 1 puff into the lungs every 4 hours as needed for Wheezing    LEVALBUTEROL (XOPENEX) 0.63 MG/3ML NEBULIZATION    Take 3 mLs by nebulization every 4 hours as needed for Wheezing    LEVETIRACETAM (KEPPRA) 750 MG TABLET    Take 1 tablet by mouth 2 times daily    LEVOTHYROXINE (SYNTHROID) 50 MCG TABLET    TAKE 1 TABLET BY MOUTH DAILY    MEGESTROL (MEGACE) 40 MG/ML SUSPENSION    Take 5 mLs by mouth daily    METOPROLOL TARTRATE (LOPRESSOR) 25 MG TABLET    Take 1 tablet by mouth 3 times daily as needed (systolic BP in right arm above 160)    MIDODRINE (PROAMATINE) 5 MG TABLET    Take 1 tablet by mouth daily as needed (SBP < 100)    MULTIVITAMIN (THERAGRAN) PER TABLET    Take 1 tablet by mouth daily. NITROGLYCERIN (NITROSTAT) 0.4 MG SL TABLET    up to max of 3 total doses. If no relief after 1 dose, call 911.     NYSTATIN (MYCOSTATIN) 207618 UNIT/ML SUSPENSION    TAKE 5 MLS BY MOUTH 4 TIMES DAILY    SERTRALINE (ZOLOFT) 100 MG TABLET    Take 1 tablet by mouth daily    TIOTROPIUM (SPIRIVA RESPIMAT) 2.5 MCG/ACT AERS INHALER    Inhale 2 puffs into the lungs daily    VITAMIN B-12 (CYANOCOBALAMIN) 1000 MCG TABLET    Take 1 tablet by mouth daily    XARELTO 15 MG TABS TABLET    TAKE 1 TABLET BY MOUTH EVERY DAY WITH LARGE MEAL OF THE DAY       ALLERGIES     Cymbalta [duloxetine hcl] and Sulfa antibiotics    FAMILY HISTORY       Family History   Problem Relation Age of Onset    Stroke Mother     Parkinsonism Mother     High Blood Pressure Mother     Cancer Daughter         breast    Other Father         black lung    Lung Cancer Brother     Breast Cancer Maternal Aunt     Heart Disease Neg Hx     High Cholesterol Neg Hx           SOCIAL HISTORY       Social History     Socioeconomic History    Marital status:      Spouse name: None    Number of children: 3    Years of education: None    Highest education level: None   Occupational History    Occupation: retired clerical job   Social Needs    Financial resource strain: None    Food insecurity:     Worry: None     Inability: None    Transportation needs:     Medical: None     Non-medical: None   Tobacco Use    Smoking status: Never Smoker    Smokeless tobacco: Never Used   Substance and Sexual Activity    Alcohol use: No     Alcohol/week: 0.0 oz    Drug use: No    Sexual activity: Not Currently   Lifestyle    Physical activity:     Days per week: None     Minutes per session: None    Stress: None   Relationships    Social connections:     Talks on phone: None     Gets together: None     Attends Holiness service: None     Active member of club or organization: None     Attends meetings of clubs or organizations: None     Relationship status: None    Intimate partner violence:     Fear of current or ex partner: None     Emotionally abused: None     Physically abused: None     Forced sexual activity: None   Other Topics Concern    None   Social History Narrative    None       SCREENINGS             PHYSICAL EXAM    (up to 7 for level 4, 8 or more for level 5)     ED Triage Vitals [03/30/19 1249]   BP Temp Temp Source Pulse Resp SpO2 Height Weight   (!) 105/53 98.8 °F (37.1 °C) Infrared 84 16 97 % 5' 1\" (1.549 m) 109 lb (49.4 kg)       Physical Exam    DIAGNOSTIC RESULTS     EKG: All EKG's are interpreted by the Emergency Department Physician who either signs or Co-signsthis chart in the absence of a cardiologist.    An EKG was repeated today that shows a sinus rhythm at a rate of 77 beats a minute with some bilateral ST depressions in the precordial leads. These are new compared to the earlier EKG and other EKGs on record.     RADIOLOGY:   Non-plain filmimages such as CT, Ultrasound and MRI are read by the radiologist. Plain radiographic images are visualized and preliminarily interpreted by the emergency physician with the below findings:    See below    Interpretation per the Radiologist significantly low potassium. In addition, her EKG appears to show some ST depressions in her lateral precordial leads. She'll be started on potassium and I will be recommending admission for further care. MDM      REASSESSMENT              CONSULTS:  IP CONSULT TO HOSPITALIST    PROCEDURES:  Unless otherwise noted below, none     Procedures    FINAL IMPRESSION      1. Syncope and collapse    2. Hypokalemia          DISPOSITION/PLAN   DISPOSITION        PATIENT REFERREDTO:  No follow-up provider specified. DISCHARGEMEDICATIONS:  New Prescriptions    No medications on file     Controlled Substances Monitoring:     No flowsheet data found.     (Please note that portions of this note were completed with a voice recognition program.  Efforts were made to edit the dictations but occasionally words are mis-transcribed.)    Velma Lorenzo MD (electronically signed)  Attending Emergency Physician         Velma Lorenzo MD  03/30/19 8005

## 2019-03-30 NOTE — ED NOTES
Bed: 13  Expected date:   Expected time:   Means of arrival: [facility]  Comments:  [facility] 24 Diamante Bajwa RN  03/30/19 6531

## 2019-03-30 NOTE — ED PROVIDER NOTES
[address] [facility]        Pt Name: Amy Roach  MRN: [id number]  [place] 9/7/1931  Date of evaluation: 3/30/2019  Provider: YOUSUF Carrasquillo CNP  PCP: Daniel Stoddard MD      CHIEF COMPLAINT       Chief Complaint   Patient presents with    Cough     states pill got stuck in throat last night. Woke up with burning in throat and cough. HISTORY OF PRESENT ILLNESS   (Location/Symptom, Timing/Onset,Context/Setting, Quality, Duration, Modifying Factors, Severity)  Note limiting factors. Amy Roach is a 80 y.o. female who presents emergency Department with concern for cough. The patient reports that she woke up with this. She thinks that one of her pills that she took last night got stuck in her throat. She does report a reflux type burning in her chest.  She has tried no over-the-counter prescribed remedies for symptoms. Her daughter is at bedside and provides further history. She reports that she's lost 7 or 8 pounds over the past week. There is no specific reason for this. They had a PCP appointment this morning which they missed in favor of coming to the ER. The daughter reports that the patient woke up this morning and could only see cold and purple colors. She also reports that her blood pressure was low, 57/41. The patient denies fever, rash, headaches, dizziness, chest pain, shortness of breath, congestion, abdominal pain, nausea, vomiting, diarrhea, constipation, blood in the stool, or painful urination. Family at bedside. Nursing Notes triage note reviewed and agreed with or any disagreements were addressed  in the HPI. REVIEW OF SYSTEMS    (2-9 systems for level 4, 10 or more for level 5)     Review of Systems   Constitutional: Negative for chills and fever. HENT: Negative for postnasal drip, rhinorrhea and sore throat. Eyes: Negative for visual disturbance.    Respiratory: Positive for cough. Negative for shortness of breath. Cardiovascular: Negative for chest pain. Gastrointestinal: Negative for abdominal pain, blood in stool, constipation, diarrhea, nausea and vomiting. Genitourinary: Negative for dysuria, flank pain and hematuria. Skin: Negative for rash. Neurological: Negative for weakness and headaches. All other systems reviewed and are negative. Positives and Pertinent negatives as per HPI. Except as noted above in the ROS, all other systems were reviewed and negative.        PAST MEDICAL HISTORY     Past Medical History:   Diagnosis Date    Anemia     Asthma     Blood circulation, collateral     Clostridium difficile diarrhea 10/21/2017    Constipation     COPD (chronic obstructive pulmonary disease) (HCC)     DDD (degenerative disc disease), cervical     Essential hypertension     GERD (gastroesophageal reflux disease)     History of traumatic head injury 05/2005    Hypothyroidism     Major depressive disorder     Oropharyngeal dysphagia     Orthostatic hypotension     Osteopenia     Other disorders of kidney and ureter in diseases classified elsewhere     Pacemaker     Paroxysmal atrial fibrillation (HCC)     Partial epilepsy, with intractable epilepsy, pharmacoresistant ([address]     Personal history of malignant neoplasm of cervix uteri 6/6/2013    Personal history of PE (pulmonary embolism)     post surgical    Pneumonia     Post traumatic seizure ([address]     Seizures ([address]     Sinus node dysfunction ([address]     Spinal stenosis of lumbar region          SURGICAL HISTORY       Past Surgical History:   Procedure Laterality Date    COLONOSCOPY  04/2013    normal except diverticulosis    PACEMAKER PLACEMENT Left     MRI compatible    PACEMAKER PLACEMENT Left     ROTATOR CUFF REPAIR Right     ROLAND AND BSO  9/2010    cervical cancer, Dr. Luly Link ENDOSCOPY  02/2016    normal         CURRENT MEDICATIONS Previous Medications    CRANBERRY 1000 MG CAPS    Take 2 capsules by mouth daily     DONEPEZIL (ARICEPT) 10 MG TABLET    Take 1 tablet by mouth nightly    FERROUS SULFATE 325 (65 FE) MG TABLET    TAKE 1 TABLET BY MOUTH EVERY DAY WITH BREAKFAST    FLECAINIDE (TAMBOCOR) 50 MG TABLET    Take 1 tablet by mouth every 12 hours    FLUDROCORTISONE (FLORINEF) 0.1 MG TABLET    Take 1 tablet by mouth daily    LEVALBUTEROL (XOPENEX HFA) 45 MCG/ACT INHALER    Inhale 1 puff into the lungs every 4 hours as needed for Wheezing    LEVALBUTEROL (XOPENEX) 0.63 MG/3ML NEBULIZATION    Take 3 mLs by nebulization every 4 hours as needed for Wheezing    LEVETIRACETAM (KEPPRA) 750 MG TABLET    Take 1 tablet by mouth 2 times daily    LEVOTHYROXINE (SYNTHROID) 50 MCG TABLET    TAKE 1 TABLET BY MOUTH DAILY    MEGESTROL (MEGACE) 40 MG/ML SUSPENSION    Take 5 mLs by mouth daily    METOPROLOL TARTRATE (LOPRESSOR) 25 MG TABLET    Take 1 tablet by mouth 3 times daily as needed (systolic BP in right arm above 160)    MIDODRINE (PROAMATINE) 5 MG TABLET    Take 1 tablet by mouth daily as needed (SBP < 100)    MULTIVITAMIN (THERAGRAN) PER TABLET    Take 1 tablet by mouth daily. NITROGLYCERIN (NITROSTAT) 0.4 MG SL TABLET    up to max of 3 total doses. If no relief after 1 dose, call 911.     NYSTATIN (MYCOSTATIN) 809853 UNIT/ML SUSPENSION    TAKE 5 MLS BY MOUTH 4 TIMES DAILY    SERTRALINE (ZOLOFT) 100 MG TABLET    Take 1 tablet by mouth daily    TIOTROPIUM (SPIRIVA RESPIMAT) 2.5 MCG/ACT AERS INHALER    Inhale 2 puffs into the lungs daily    VITAMIN B-12 (CYANOCOBALAMIN) 1000 MCG TABLET    Take 1 tablet by mouth daily    XARELTO 15 MG TABS TABLET    TAKE 1 TABLET BY MOUTH EVERY DAY WITH LARGE MEAL OF THE DAY         ALLERGIES     Cymbalta [duloxetine hcl] and Sulfa antibiotics    FAMILY HISTORY       Family History   Problem Relation Age of Onset    Stroke Mother     Parkinsonism Mother     High Blood Pressure Mother     Cancer Daughter eye exhibits no discharge. Left eye exhibits no discharge. Neck: Normal range of motion. Cardiovascular: Normal rate, regular rhythm, normal heart sounds and intact distal pulses. Exam reveals no gallop and no friction rub. No murmur heard. Pulmonary/Chest: Effort normal and breath sounds normal. No stridor. No respiratory distress. She has no wheezes. She has no rales. She exhibits no tenderness. Musculoskeletal: Normal range of motion. Neurological: She is alert and oriented to person, place, and time. Skin: Skin is warm and dry. She is not diaphoretic. No pallor. Psychiatric: She has a normal mood and affect. Her behavior is normal.   Nursing note and vitals reviewed. DIAGNOSTIC RESULTS   LABS:    Labs Reviewed - No data to display    All other labs werewithin normal range or not returned as of this dictation. EKG: All EKG's are interpreted by the Emergency Department Physician who either signs or Co-signs this chart in the absence of acardiologist.  Please see their note for interpretation of EKG. RADIOLOGY:   Interpretation per the Radiologist below, if available at the time of this note:    XR CHEST STANDARD (2 VW)   Final Result   1. No acute abnormality. No results found.       PROCEDURES   Unless otherwise noted below, none     Procedures    CRITICAL CARE TIME     n/a    CONSULTS:  None      EMERGENCY DEPARTMENT COURSE and DIFFERENTIAL DIAGNOSIS/MDM:   Vitals:    Vitals:    03/30/19 0906 03/30/19 0907 03/30/19 1026 03/30/19 1027   BP:  (!) 181/79 134/64    Pulse: 68      Resp: 16      Temp: 97.8 °F (36.6 °C)      TempSrc: Oral      SpO2: 98%  94% 95%   Weight: 107 lb (48.5 kg)      Height: 5' 1\" (1.549 m)          [facility] was given the following medications:  Medications   aluminum & magnesium hydroxide-simethicone (MAALOX) 30 mL, lidocaine viscous (XYLOCAINE) 5 mL (GI COCKTAIL) ( Oral Given 3/30/19 0943)   acetaminophen (TYLENOL) tablet 650 mg (650 mg Oral Given 3/30/19 [facility])       Fiona Turner was evaluated in the emergency department with concern for cough. She also reports a burning sensation in her chest after pill got stuck in her throat last night. Treated with GI cocktail for this. Chest x-ray negative for any acute abnormality. Vitals are stable. The daughter also reports that the patient had hypotension this morning, although this is a chronic issue. She is treated with Midrin and does not have hypotension in the ER. The patient was seen in the ER with concern for vague complaints. Neurologically intact. Benign exam.  Ambulating in the ER without difficulty. No evidence of anemia or leukocytosis on labs. Renal function is preserved. My suspicion is low for: acute kidney injury, CAD, MI, PE, stroke, TIA, sepsis, GI bleed, malignancy, urinary tract infection, cardiac arrhythmia, meningitis, or other concerning etiology. Fiona Turner is stable in the ER and safe to follow as an outpatient. The patient is discharged on the following medications. They were counseled on how to take the newly prescribed medications:  New Prescriptions    No medications on file    . Instructed to follow-up with:  Jody Bonner, [address]  [phone]    Schedule an appointment as soon as possible for a visit in 3 days  For a recheck    Return to the ER for new or worsening symptoms. This plan was discussed with the patient and all family available in the room at discharge who are all in agreement with the plan. The patient tolerated their visit well. They were seen and evaluated by the attending physician, Prisca Carpenter MD , who agreed with the assessment and plan. The patient and / or the family were informed of the results of any tests, a time was given to answer questions, a plan was proposed and they agreed with plan. FINAL IMPRESSION      1.  Cough          DISPOSITION/PLAN   DISPOSITION Decision To Discharge 03/30/2019 10:01:47 AM        DISCONTINUED MEDICATIONS:  Discontinued Medications    No medications on file                (Please note that portions of this note were completed with a voice recognition program.  Efforts were made to edit the dictations but occasionally words are mis-transcribed.)    YOUSUF Zheng CNP (electronically signed)        YOUSUF Zheng CNP  03/30/19 1036

## 2019-03-30 NOTE — PROGRESS NOTES
Patient admitted to room 3378 from ED 13.  Patient oriented to room, call light, bed rails, phone, lights and bathroom. Patient instructed about the schedule of the day including: vital sign frequency, lab draws, possible tests, frequency of MD and staff rounds, including RN/MD rounding together at bedside, daily weights, and I &O's. Patient instructed about prescribed diet, how to use 8MENU, and television. bed alarm in place, patient aware of placement and reason. telemetry 3378 in place, patient aware of placement and reason. Bed locked, in lowest position, side rails up 2/4, call light within reach. Will continue to monitor.

## 2019-03-31 NOTE — PROGRESS NOTES
Hospitalist   Progress Note    Patient Name: Foster Castellanos  PCP: Viktor Gale MD  Date of Admission: 3/30/2019    Chief Complaint on Admission: passing out x 3  Chief diagnosis after evaluation: syncope and collapse    Brief Synopsis: Patient 80 y.o. woman with a history of hypertension, COPD, hypothyroidism and atrial fibrillation who was admitted on 3/30/2019 for treatment of syncope and collapse    Pt Seen/Examined and Chart Reviewed. Subjective: Pt is feeling better and has no new complaints this am. Spoke with her daughter at bedside    Objective: Allergies  Cymbalta [duloxetine hcl] and Sulfa antibiotics    Medications    Scheduled Meds:   rivaroxaban  15 mg Oral Daily    sertraline  100 mg Oral Daily    megestrol  200 mg Oral Daily    levothyroxine  50 mcg Oral Daily    levETIRAcetam  750 mg Oral BID    fludrocortisone  0.1 mg Oral Daily    flecainide  50 mg Oral 2 times per day    donepezil  10 mg Oral Nightly    sodium chloride flush  10 mL Intravenous 2 times per day     Infusions:    PRN Meds:  metoprolol tartrate, sodium chloride flush, magnesium hydroxide, ondansetron, acetaminophen    Physical    VITALS:  BP (!) 169/78   Pulse 74   Temp 98.4 °F (36.9 °C) (Oral)   Resp 18   Ht 5' 1\" (1.549 m)   Wt 110 lb 14.4 oz (50.3 kg)   SpO2 96%   BMI 20.95 kg/m²   CONSTITUTIONAL:  WD/WN 80y.o. year-old  female who is awake, alert, cooperative, no apparent distress, and appears stated age  EYES:  Lids and lashes normal, PERRL, EOMI, sclera clear, conjunctiva normal  ENT:  NC/AT, MMM    NECK:  Supple, symmetrical, trachea midline, no adenopathy  HEMATOLOGIC/LYMPHATICS:  no cervical, supraclavicular or axillary lymphadenopathy  LUNGS:  clear to auscultation bilaterally, No increased work of breathing, good air exchange, no crackles or wheezing  CARDIOVASCULAR:  Regular rate and rhythm, normal S1 and S2, no S3 or S4, and no significant murmurs, rubs or gallops noted.  Normal apical for: PHART, NDQ7FFR, C0DAYHVQ, ILH1JOI, BEART, THGBART, PO2ART, OPE2OMT        Intake/Output Summary (Last 24 hours) at 3/31/2019 1349  Last data filed at 3/31/2019 1106  Gross per 24 hour   Intake 360 ml   Output 775 ml   Net -415 ml       Consults:  IP CONSULT TO HOSPITALIST  IP CONSULT TO P.[address] Problems    Diagnosis Date Noted    Essential hypertension [I10] 01/18/2016     Priority: High    COPD (chronic obstructive pulmonary disease) ([facility] [J44.9] 06/06/2013     Priority: High    Hypothyroidism [E03.9] 03/19/2015     Priority: Medium    Syncope and collapse [R55] 03/30/2019    Nausea and vomiting [R11.2] 03/30/2019    Abnormal EKG [R94.31] 03/30/2019    Hypokalemia [E87.6]     Paroxysmal atrial fibrillation ([facility] [I48.0] 01/23/2017       ASSESSMENT AND PLAN:    Syncope and collapse - etiology unclear. Pt very anxious. Possibly Vaso Vagal due to her anxiety or having emesis (her daughter reports that she was \"dry heaving\" at the time). Had Echocardiogram on 01/04/2019, I won't order another at this time. Monitor on Telemetry and await Cardiology consult     Non-Intractable vomiting with nausea - None since admission; continue to provide symptomatic treatment with Zofran as needed, maintenance of fluids and electrolytes.     Abnormal EKG - ST depressions in the precordial leads. Pt denies chest pain; has been stable Telemetry and serial cardiac enzymes are negative      Hypokalemia - severe; replaceed Potassium and normal this am     Acquired hypothyroidism - stable; continue Levothyroxine     Paroxysmal atrial fibrillation (HCC) - currently in sinus rhythm; will continue Metoprolol, Flecainide and Xarelto     COPD (chronic obstructive pulmonary disease) ([facility] - stable without exacerbation.  Monitor     Essential (primary) hypertension - continue home meds and monitor blood pressure          DVT Prophylaxis: Xarelto  Diet: DIET GENERAL;  Code Status: Full Code    PT/OT Eval Status: Not Ordered    Dispo - Anticipated discharge date 1 day    Reza Barrios MD

## 2019-03-31 NOTE — CONSULTS
[id number]    Syncope  Labile BP  PPM  Hypokalemia  No sig CAD cath 2018    Interrogate PPM  EP eval  May need to put back on midodrine and accept elev BP

## 2019-03-31 NOTE — PROGRESS NOTES
Shift assessment complete, no meds to give at this time. Pt resting comfortably in bed. Vitals stable. Will continue to monitor.

## 2019-04-01 NOTE — PROGRESS NOTES
No significant changes since earlier assessment, except as noted; no s/s of acute distress at present; dtr at bedside; pt remains mildly disoriented, forgetful - dtr verbalizes concern R/T same, and plan to discuss with PCP and with Neuro (Dr Indira Boothe) as OP. Joana Faria

## 2019-04-01 NOTE — PROGRESS NOTES
Hospitalist   Progress Note    Patient Name: Matilde Carlisle  PCP: Cherie Rahman MD  Date of Admission: 3/30/2019    Chief Complaint on Admission: passing out x 3  Chief diagnosis after evaluation: syncope and collapse    Brief Synopsis: Patient 80 y.o. woman with a history of hypertension, COPD, hypothyroidism and atrial fibrillation who was admitted on 3/30/2019 for treatment of syncope and collapse    Pt Seen/Examined and Chart Reviewed. Subjective: Pt again has no new complaints this am. Spoke with her daughter at bedside; Pt is sundowning and having hallucinations overnight    Objective:   Allergies  Cymbalta [duloxetine hcl] and Sulfa antibiotics    Medications    Scheduled Meds:   midodrine  5 mg Oral TID WC    potassium chloride  20 mEq Oral Once    rivaroxaban  15 mg Oral Daily    sertraline  100 mg Oral Daily    megestrol  200 mg Oral Daily    levothyroxine  50 mcg Oral Daily    levETIRAcetam  750 mg Oral BID    flecainide  50 mg Oral 2 times per day    sodium chloride flush  10 mL Intravenous 2 times per day     Infusions:    PRN Meds:  metoprolol tartrate, sodium chloride flush, magnesium hydroxide, ondansetron, acetaminophen    Physical    VITALS:  /68   Pulse 74   Temp 98.5 °F (36.9 °C) (Oral)   Resp 16   Ht 5' 1\" (1.549 m)   Wt 110 lb 12.8 oz (50.3 kg)   SpO2 94%   BMI 20.94 kg/m²   CONSTITUTIONAL:  WD/WN 80y.o. year-old  female who is sitting up in bed awake, alert, cooperative, no apparent distress, and appears stated age  EYES:  Lids and lashes normal, PERRL, EOMI, sclera clear, conjunctiva normal  ENT:  NC/AT, MMM    NECK:  Supple, symmetrical, trachea midline, no adenopathy  HEMATOLOGIC/LYMPHATICS:  no cervical, supraclavicular or axillary lymphadenopathy  LUNGS:  clear to auscultation bilaterally, No increased work of breathing, good air exchange, no crackles or wheezing  CARDIOVASCULAR:  Regular rate and rhythm, normal S1 and S2, no S3 or S4, and no significant murmurs, rubs or gallops noted. Normal apical impulse. ABDOMEN:  Normal active bowel sounds, soft, non-tender, non-distended, no masses palpated, no organomegally  MUSCULOSKELETAL:  Full range of motion noted. NEUROLOGIC:  Awake, alert, oriented to name, place and time. Cranial nerves II-XII are grossly intact. SKIN:  normal skin color, texture, turgor for age.     Data    CBC with Differential:    Lab Results   Component Value Date    WBC 9.1 04/01/2019    HGB 11.8 04/01/2019    HCT 34.3 04/01/2019     04/01/2019    MCV 93.9 04/01/2019    RDW 13.1 04/01/2019    BANDSPCT 7 03/30/2019    LYMPHOPCT 17.5 04/01/2019    MONOPCT 6.2 04/01/2019    EOSPCT 2.1 08/04/2010    BASOPCT 0.5 04/01/2019    MONOSABS 0.6 04/01/2019    LYMPHSABS 1.6 04/01/2019    EOSABS 0.1 04/01/2019    BASOSABS 0.0 04/01/2019    DIFFTYPE Auto-K 08/04/2010     BMP:    Lab Results   Component Value Date     04/01/2019    K 3.2 04/01/2019     04/01/2019    CO2 22 04/01/2019    BUN 14 04/01/2019    CREATININE 0.5 04/01/2019    GLUCOSE 117 04/01/2019    CALCIUM 9.0 04/01/2019    GFRAA >60 04/01/2019    GFRAA >60 06/13/2013    LABGLOM >60 04/01/2019     LFT:   Lab Results   Component Value Date    ALKPHOS 48 03/30/2019    ALT 18 03/30/2019    AST 26 03/30/2019    PROT 6.7 03/30/2019    PROT 7.4 11/04/2011    BILITOT 0.7 03/30/2019    BILIDIR <0.2 03/07/2019    IBILI see below 03/07/2019     Magnesium:    Lab Results   Component Value Date    MG 1.90 04/01/2019     Phosphorus:    Lab Results   Component Value Date    PHOS 3.8 11/01/2017     PT/INR:  No results found for: PTINR  U/A:    Lab Results   Component Value Date    LEUKOCYTESUR TRACE 03/30/2019    NITRITE Negative 06/14/2018    WBCUA 1 03/30/2019    RBCUA 2 03/30/2019    SPECGRAV 1.011 03/30/2019    UROBILINOGEN 0.2 03/30/2019    BILIRUBINUR Negative 03/30/2019    BILIRUBINUR Negative 06/14/2018    BLOODU Negative 03/30/2019    GLUCOSEU Negative 03/30/2019 PROTEINU Negative 03/30/2019     ABG:  No results found for: PHART, DLS3DKS, O4KYYAKT, YKI1JXT, BEART, THGBART, PO2ART, UAR6MNZ        Intake/Output Summary (Last 24 hours) at 4/1/2019 1243  Last data filed at 4/1/2019 0936  Gross per 24 hour   Intake 240 ml   Output 775 ml   Net -535 ml       Consults:  IP CONSULT TO HOSPITALIST  IP CONSULT TO P.[address] Problems    Diagnosis Date Noted    Essential hypertension [I10] 01/18/2016     Priority: High    COPD [facility]) [facility] [J44.9] 06/06/2013     Priority: High    Hypothyroidism [E03.9] 03/19/2015     Priority: Medium    Syncope and collapse [R55] 03/30/2019    Nausea and vomiting [R11.2] 03/30/2019    Abnormal EKG [R94.31] 03/30/2019    Hypokalemia [E87.6]     Paroxysmal atrial fibrillation ([facility] [I48.0] 01/23/2017       ASSESSMENT AND PLAN:    Syncope and collapse - appears to be secondary to Orthostatic Hypotension. Cardiology consult appreciated    Orthostatic Hypotension -   - EP consult appreciated  - Midodrine TID scheduled  - Aricept DCd due to its association with syncope, hypotension, etc.    Sundowning/delirium, hallucinations - reorient; discharge to familiar surroundings as soon as able    Non-Intractable vomiting with nausea - None since admission; continue to provide symptomatic treatment with Zofran as needed, maintenance of fluids and electrolytes.     Abnormal EKG - ST depressions in the precordial leads. Pt denies chest pain; has been stable Telemetry and serial cardiac enzymes are negative      Hypokalemia - severe; replaceed Potassium and normal this am     Acquired hypothyroidism - stable; continue Levothyroxine     Paroxysmal atrial fibrillation (HCC) - currently in sinus rhythm; will continue Metoprolol, Flecainide and Xarelto     COPD (chronic obstructive pulmonary disease) ([facility] - stable without exacerbation.  Monitor     Essential (primary) hypertension - continue home meds and monitor blood pressure          DVT Prophylaxis: Xarelto  Diet: DIET GENERAL;  Code Status: Full Code    PT/OT Eval Status: Not Ordered    Dispo - Anticipated discharge date 1 day; when cleared by Karolina Sanchez MD

## 2019-04-01 NOTE — PROGRESS NOTES
Dual chamber pacemaker interrogated. No arrhythmia noted. Device functions normally.  Pacing and sensing thresholds normal.     Janette Escalona MD, MPH  [place] [address]   Office: [phone]

## 2019-04-01 NOTE — PROGRESS NOTES
Up to bathroom and returned to bed, no c/o dizziness. Vitals stable. No other needs, callbell in reach and bed alarm on.   Sb Arias RN

## 2019-04-01 NOTE — PROGRESS NOTES
Assessment complete, vitals stable, HS meds given. Pt up to bathroom and returned to bed, no dizziness, tolerated well. Denies any other needs, call bell in reach and bed alarm on.   Rogue Heimlich, RN

## 2019-04-01 NOTE — CONSULTS
[address]   Electrophysiology Consultation   Date: 4/1/2019  Reason for Consultation: Syncope   Consult Requesting Physician: Jose Alberto Gannon MD     Chief Complaint   Patient presents with    Loss of Consciousness     pt recently d/c from ER for UTI. pt to PCP and had 3 syncopal episodes. PCP states pt needs to be in hospital.      HPI: Beka Sancehz is a 80 y.o. history of recurrent syncope due to hypotension, history of sinus node dysfunction s/p dual chamber pacemaker on 9/26/2016 ([employer]), history of seizures since head injury in 2005, normal coronary anatomy with cath in 2018. She also has history of PAF and is on Xarelto and Flecainide. She has presented to the hospital with syncope. She has low BP and has had syncope in the past.     Patient states that she had severe nausea. On admission she was found to have severe hypokalemia. She states that she was recently treated with antibiotic therapy for UTI. She was at her PCP office when passed out three time. She was sitting and her daughter noted her passing out and held her. She again passed out when she was in bathroom for getting a urine sample. Her daughter states that  She has lost 8 lbs in the past two weeks. She has loose stool. Yesterday also experienced some chest pain. Denies any chest pain now. Complains of uncontrolled HTN and is on florinef.        Past Medical History:   Diagnosis Date    Anemia     Asthma     Blood circulation, collateral     Clostridium difficile diarrhea 10/21/2017    Constipation     [facility]     DDD (degenerative disc disease), cervical     Essential hypertension     GERD (gastroesophageal reflux disease)     History of traumatic head injury 05/2005    Hypothyroidism     Major depressive disorder     Oropharyngeal dysphagia     Orthostatic hypotension     Osteopenia     Other disorders of kidney and ureter in diseases classified elsewhere     Pacemaker     Paroxysmal atrial fibrillation (HCC)     Partial epilepsy, with intractable epilepsy, pharmacoresistant ([address]     Personal history of malignant neoplasm of cervix uteri 6/6/2013    Personal history of PE (pulmonary embolism)     post surgical    Pneumonia     Post traumatic seizure ([address]     Seizures ([address]     Sinus node dysfunction ([address]     Spinal stenosis of lumbar region         Past Surgical History:   Procedure Laterality Date    COLONOSCOPY  04/2013    normal except diverticulosis    PACEMAKER PLACEMENT Left     MRI compatible    PACEMAKER PLACEMENT Left     ROTATOR CUFF REPAIR Right     ROLAND AND BSO  9/2010    cervical cancer, Dr. Jovanny Zuleta ENDOSCOPY  02/2016    normal       Allergies   Allergen Reactions    Cymbalta [Duloxetine Hcl] Other (See Comments)     Sleepy dizzy    Sulfa Antibiotics      Doesn't remember       Social History:  Reviewed. reports that she has never smoked. She has never used smokeless tobacco. She reports that she does not drink alcohol or use drugs. Family History:  Reviewed. family history includes Breast Cancer in her maternal aunt; Cancer in her daughter; High Blood Pressure in her mother; Lung Cancer in her brother; Other in her father; Parkinsonism in her mother; Stroke in her mother. Review of System:  All other systems reviewed except for that noted above.  Pertinent negatives and positives are:       · General: negative for fever, chills   · Ophthalmic ROS: negative for - eye pain or loss of vision  · ENT ROS: negative for - headaches, sore throat   · Respiratory: negative for - cough, sputum  · Cardiovascular: Reviewed in HPI  · Gastrointestinal: negative for - abdominal pain, + diarrhea, N/V  · Hematology: negative for - bleeding, blood clots, bruising or jaundice  · Genito-Urinary:  negative for - Dysuria or incontinence  · Musculoskeletal: negative for - Joint swelling, muscle pain  · Neurological: negative for - confusion, dizziness, headaches   · Psychiatric: No anxiety, no depression. · Dermatological: negative for - rash    Physical Examination:  Vitals:    19 0745   BP:    Pulse: 66   Resp: 16   Temp: 98.8 °F (37.1 °C)   SpO2: 94%      In: 240 [P.O.:240]  Out: 600    Wt Readings from Last 3 Encounters:   19 110 lb 12.8 oz (50.3 kg)   19 109 lb 12.8 oz (49.8 kg)   19 107 lb (48.5 kg)     Temp  Av.4 °F (36.9 °C)  Min: 98 °F (36.7 °C)  Max: 98.8 °F (37.1 °C)  Pulse  Av.4  Min: 66  Max: 87  BP  Min: 151/72  Max: 184/78  SpO2  Av.4 %  Min: 94 %  Max: 96 %    Intake/Output Summary (Last 24 hours) at 2019 0946  Last data filed at 2019 0936  Gross per 24 hour   Intake 240 ml   Output 625 ml   Net -385 ml       · Telemetry: Sinus rhythm   · Constitutional: Oriented. No distress. · Head: Normocephalic and atraumatic. · Mouth/Throat: Oropharynx is clear and moist.   · Eyes: Conjunctivae normal. EOM are normal.   · Neck: Neck supple. No rigidity. No JVD present. · Cardiovascular: Normal rate, regular rhythm, S1&S2. · Pulmonary/Chest: Bilateral respiratory sounds. · Abdominal: Soft. Bowel sounds present. No distension, No tenderness. · Musculoskeletal: No tenderness. No edema    · Lymphadenopathy: Has no cervical adenopathy. · Neurological: Alert and oriented. Cranial nerve appears intact, No Gross deficit   · Skin: Skin is warm and dry. No rash noted. · Psychiatric: Has a normal behavior     Labs, diagnostic and imaging results reviewed. Reviewed.    Recent Labs     19  1334 19  1927 19  0748 19  0450     --  142 142   K 2.7* 3.6 3.5 3.2*     --  107 108   CO2 23  --  24 22   BUN 17  --  19 14   CREATININE 0.7  --  0.6 0.5*     Recent Labs     19  1334 19  0748 19  0450   WBC 6.3 10.8 9.1   HGB 13.3 11.4* 11.8*   HCT 38.8 33.6* 34.3*   MCV 92.9 92.6 93.9    210 214 Lab Results   Component Value Date    TROPONINI <0.01 03/31/2019     No results found for: BNP  Lab Results   Component Value Date    PROTIME 22.4 02/10/2019    PROTIME 13.7 01/03/2019    PROTIME na 01/03/2019    INR 1.96 02/10/2019    INR 1.20 01/03/2019    INR na 01/03/2019     Lab Results   Component Value Date    CHOL 178 01/04/2019    HDL 54 01/04/2019    TRIG 97 01/04/2019       ECG: Sinus rhythm, ST-T changes. Echo: 1/4/2019:   Left ventricle - normal size, thickness and function with EF of 70%     Mitral valve - trivial regurgitation     Pacer wire noted in right ventricle and right atrium.     Tricuspid valve - mild regurgitation with RVSP estimated at 36 mmHg.   Cath: No significant CAD 2018    Scheduled Meds:   potassium chloride  20 mEq Oral Once    rivaroxaban  15 mg Oral Daily    sertraline  100 mg Oral Daily    megestrol  200 mg Oral Daily    levothyroxine  50 mcg Oral Daily    levETIRAcetam  750 mg Oral BID    fludrocortisone  0.1 mg Oral Daily    flecainide  50 mg Oral 2 times per day    donepezil  10 mg Oral Nightly    sodium chloride flush  10 mL Intravenous 2 times per day     Continuous Infusions:  PRN Meds:.metoprolol tartrate, sodium chloride flush, magnesium hydroxide, ondansetron, acetaminophen     Patient Active Problem List    Diagnosis Date Noted    Unstable angina ([facility] 07/11/2018     Priority: High    Essential hypertension 01/18/2016     Priority: High    Orthostatic hypotension 03/02/2015     Priority: High    Partial epilepsy with intractable epilepsy ([facility] 08/19/2014     Priority: High    COPD (chronic obstructive pulmonary disease) ([facility] 06/06/2013     Priority: High    Post traumatic seizure ([facility] 06/06/2013     Priority: High    Anemia 05/31/2016     Priority: Medium    Hypothyroidism 03/19/2015     Priority: Medium    Constipation 03/02/2015     Priority: Low    Spinal stenosis of lumbar region 07/07/2014     Priority: Low    DDD (degenerative disc - PT/OT. Thank you for allowing me to participate in the care of Virgil Ponce     NOTE: This report was transcribed using voice recognition software. Every effort was made to ensure accuracy, however, inadvertent computerized transcription errors may be present.      Jeramy Leal MD, MPH  [place] [address]   Office: [phone]

## 2019-04-01 NOTE — PROGRESS NOTES
On the basis of positive falls risk screening, assessment and plan is as follows: refer to ED   Boaz Velez  : 1931  Encounter date: 3/30/2019    This kalli 80 y.o. female who presents with  No chief complaint on file. History of present illness:    HPI   Patient presents for weakness and syncope. Patient reports she woke up this morning felt extremely weak. She went into the bathroom, vomited and had a syncopal episode. Her daughter reports her blood pressure was 5541 at the times that she called EMS. Patient was evaluated in the emergency room with an EKG, chest x-ray and was discharged. Patient still feels very unwell so she presented directly to clinic. Patient describes intermittent cold sweats, overall weakness, dysuria, headache, nausea, vomiting, subjective fevers, increased emotional lability related to her grandsons hospitalization. She also reports her lower extremities are swollen. Allergies   Allergen Reactions    Cymbalta [Duloxetine Hcl] Other (See Comments)     Sleepy dizzy    Sulfa Antibiotics      Doesn't remember     No current facility-administered medications for this visit.       Current Outpatient Medications   Medication Sig Dispense Refill    levETIRAcetam (KEPPRA) 750 MG tablet TAKE 2 TABLETS BY MOUTH TWICE A  tablet 2    potassium chloride (KLOR-CON M) 10 MEQ extended release tablet Take 1 tablet by mouth 2 times daily for 15 days 30 tablet 0     Facility-Administered Medications Ordered in Other Visits   Medication Dose Route Frequency Provider Last Rate Last Dose    midodrine (PROAMATINE) tablet 5 mg  5 mg Oral TID  Andrea Duverney, MD        potassium chloride (KLOR-CON) extended release tablet 20 mEq  20 mEq Oral Once Abelino Teixeira MD        rivaroxaban Janell Ley) tablet 15 mg  15 mg Oral Daily Abelino Teixeira MD   15 mg at 19 5080    sertraline (ZOLOFT) tablet 100 mg  100 mg Oral Daily Abelino Teixeira MD   100 mg at 19 1109    metoprolol tartrate (LOPRESSOR) tablet 25 mg  25 mg Oral TID PRN Lake Calderon MD        megestrol (MEGACE) 40 MG/ML suspension 200 mg  200 mg Oral Daily Lake Calderon MD   200 mg at 04/01/19 2582    levothyroxine (SYNTHROID) tablet 50 mcg  50 mcg Oral Daily Lake Calderon MD   50 mcg at 04/01/19 6419    levETIRAcetam (KEPPRA) tablet 750 mg  750 mg Oral BID Lake Calderon MD   750 mg at 04/01/19 9657    flecainide (TAMBOCOR) tablet 50 mg  50 mg Oral 2 times per day Lake Calderon MD   50 mg at 04/01/19 8256    sodium chloride flush 0.9 % injection 10 mL  10 mL Intravenous 2 times per day Lake Calderon MD   10 mL at 04/01/19 1786    sodium chloride flush 0.9 % injection 10 mL  10 mL Intravenous PRN Lake Calderon MD        magnesium hydroxide (MILK OF MAGNESIA) 400 MG/5ML suspension 30 mL  30 mL Oral Daily PRN Lake Calderon MD        ondansetron [facility]) injection 4 mg  4 mg Intravenous Q4H PRN Lake Calderon MD        acetaminophen (TYLENOL) tablet 650 mg  650 mg Oral Q4H PRN Lake Calderon MD   650 mg at 03/31/19 1856        Review of Systems   Constitutional: Positive for activity change, appetite change, chills, diaphoresis, fatigue and fever. Respiratory: Positive for cough and shortness of breath. Cardiovascular: Positive for leg swelling. Negative for chest pain and palpitations. Gastrointestinal: Positive for diarrhea, nausea and vomiting. Negative for constipation. Genitourinary: Positive for dysuria. Musculoskeletal: Positive for arthralgias, back pain and gait problem. Skin: Negative for rash and wound. Neurological: Positive for dizziness, tremors, syncope, weakness, light-headedness and headaches. Past medical, surgical, family and social history were reviewed and updated with the patient.     Objective:    /68 (Site: Left Upper Arm, Position: Sitting, Cuff Size: Medium Adult)   Pulse 92   Temp 98.7 °F (37.1 °C) (Tympanic)   Wt 109 lb 12.8 oz

## 2019-04-01 NOTE — CARE COORDINATION
[address] Transitions Interview     2019    Patient: Matilde Carlisle Patient : 1931   MRN: [id number]  Reason for Admission: LOC  RARS: Readmission Risk Score: 24         Spoke with: Matilde Carlisle      Readmission Risk  Patient Active Problem List   Diagnosis    COPD (chronic obstructive pulmonary disease) ([address]    DDD (degenerative disc disease), lumbar    Osteopenia    Post traumatic seizure ([address]    Spinal stenosis of lumbar region    Partial epilepsy with intractable epilepsy ([address]    Orthostatic hypotension    Constipation    Hypothyroidism    Essential hypertension    Oropharyngeal dysphagia    Anemia    Sinus node dysfunction (HCC)    Bradycardia    Pacemaker    Paroxysmal atrial fibrillation ([address]    DDD (degenerative disc disease), cervical    SOB (shortness of breath)    COPD, mild (HCC)    Mild episode of recurrent major depressive disorder ([address]    Shaking    Partial seizure ([address]    Seizure-like activity ([address]    Chest pain    Unstable angina ([address]    Aspiration into airway    Hypertensive urgency    Hypertensive emergency    Dysarthria    Hypokalemia    Syncope and collapse    Nausea and vomiting    Abnormal EKG       Inpatient Assessment  Care Transitions Summary    Care Transitions Inpatient Review  Medication Review  Do you have all of your prescriptions and are they filled?:  Yes   Are you able to afford your medications?:  Yes  How often do you have difficulty taking your medications?:  I always take them as prescribed. Housing Review  Who do you live with?:  Child  Are you an active caregiver in your home?:  No  Social Support  Do you have a ?:  No  Do you have a [address] [place] [address]?:  No  Durable Medical Equipment  Patient DME:  Neeraj Kang, Straight cane, Wheelchair  Functional Review  Ability to seek help/take action for Emergent/Urgent situations i.e. fire, crime, inclement weather or health crisis. Nomi Mehta Independent  Ability handle personal hygiene needs (bathing/dressing/grooming): Independent  Ability to manage medications: Independent  Ability to prepare food:  Independent  Ability to maintain home (clean home, laundry):  Needs Assistance  Ability to drive and/or has transportation:  Needs Assistance  Ability to do shopping:  Needs Assistance  Ability to manage finances: Independent  Is patient able to live independently?:  Yes  Hearing and Vision  Visual Impairment:  Reading glasses  Hearing Impairment:  None  Care Transitions Interventions  No Identified Needs       Plan to return home with daugther, no HHC or DME needs at this time. Agreed to CTC f/u calls after d/c. Follow Up  Future Appointments   Date Time Provider Vikki Alcala   4/4/2019 11:30 AM Carl Reyes MD PULM [facility]   5/16/2019 11:00 AM Lupe Lopez MD [facility]   5/23/2019 11:40 AM Hetal Nelson MD  NEURO [facility]       Health Maintenance  There are no preventive care reminders to display for this patient.     Columba Bowers RN

## 2019-04-01 NOTE — PROGRESS NOTES
VSS; instructed pt and dtr on Midodrine regimen (pt uses PRN only at home, but will now take routinely); no s/s of acute distress at present

## 2019-04-01 NOTE — CARE COORDINATION
Discharge Planning Assessment  RN/SW discharge planner met with patient and family to discuss reason for admission, current living situation, and potential needs at the time of discharge.     Pt in ED for HTN, slurred speech     Demographics/Insurance verified Yes     Current type of dwelling:  House. Pt reported no problems getting in/out. Has grab bars near the door to help.     Patient from ECF/SW confirmed with:  n/a     Living arrangements:  w/son     Level of function/Support:  Mostly independent. Uses a walker when she's not feeling well. Has good support from family and son     DME:  Guadalupe arizmendi, W/C, transfer bench     Active with any community resources/agencies/skilled home care:  [employer]. Dtr reported she called agency today to report pt was in hospital     Medication compliance issues:  No     Financial issues that could impact healthcare: No     Transportation at the time of discharge:  Family     Tentative discharge plan:  Most likely home. Met with patient to offer assistance with discharge, and he has no home care agency preference. [facility]

## 2019-04-02 NOTE — PROGRESS NOTES
Hospitalist   Progress Note    Patient Name: Beulah Kellre  PCP: Yuliana Latif MD  Date of Admission: 3/30/2019    Chief Complaint on Admission: passing out x 3  Chief diagnosis after evaluation: syncope and collapse    Brief Synopsis: Patient 80 y.o. woman with a history of hypertension, COPD, hypothyroidism and atrial fibrillation who was admitted on 3/30/2019 for treatment of syncope and collapse    Pt Seen/Examined and Chart Reviewed. Subjective: Pt again has no new complaints this am. Pt contnues sundowning and having hallucinations overnight. I spoke with her daughter at bedside and informed her that Pt is expected to improve once back in familiar surroundings    Objective:   Allergies  Cymbalta [duloxetine hcl] and Sulfa antibiotics    Medications    Scheduled Meds:   midodrine  5 mg Oral TID WC    potassium chloride  20 mEq Oral Once    rivaroxaban  15 mg Oral Daily    sertraline  100 mg Oral Daily    megestrol  200 mg Oral Daily    levothyroxine  50 mcg Oral Daily    levETIRAcetam  750 mg Oral BID    flecainide  50 mg Oral 2 times per day    sodium chloride flush  10 mL Intravenous 2 times per day     Infusions:    PRN Meds:  metoprolol tartrate, sodium chloride flush, magnesium hydroxide, ondansetron, acetaminophen    Physical    VITALS:  /61   Pulse 64   Temp 97.3 °F (36.3 °C) (Axillary)   Resp 17   Ht 5' 1\" (1.549 m)   Wt 109 lb 9.6 oz (49.7 kg)   SpO2 94%   BMI 20.71 kg/m²   CONSTITUTIONAL:  WD/WN 80y.o. year-old  female who is working with PT/OT, awake, alert, cooperative, no apparent distress, and appears stated age  EYES:  Lids and lashes normal, PERRL, EOMI, sclera clear, conjunctiva normal  ENT:  NC/AT, MMM    NECK:  Supple, symmetrical, trachea midline, no adenopathy  HEMATOLOGIC/LYMPHATICS:  no cervical, supraclavicular or axillary lymphadenopathy  LUNGS:  clear to auscultation bilaterally, No increased work of breathing, good air exchange, no crackles or wheezing  CARDIOVASCULAR:  Regular rate and rhythm, normal S1 and S2, no S3 or S4, and no significant murmurs, rubs or gallops noted. Normal apical impulse. ABDOMEN:  Normal active bowel sounds, soft, non-tender, non-distended, no masses palpated, no organomegally  MUSCULOSKELETAL:  Full range of motion noted. NEUROLOGIC:  Awake, alert, oriented to name, place and time. Cranial nerves II-XII are grossly intact. SKIN:  normal skin color, texture, turgor for age.     Data    CBC with Differential:    Lab Results   Component Value Date    WBC 6.5 04/02/2019    HGB 11.8 04/02/2019    HCT 34.2 04/02/2019     04/02/2019    MCV 92.0 04/02/2019    RDW 13.0 04/02/2019    BANDSPCT 7 03/30/2019    LYMPHOPCT 23.1 04/02/2019    MONOPCT 6.1 04/02/2019    EOSPCT 2.1 08/04/2010    BASOPCT 0.4 04/02/2019    MONOSABS 0.4 04/02/2019    LYMPHSABS 1.5 04/02/2019    EOSABS 0.1 04/02/2019    BASOSABS 0.0 04/02/2019    DIFFTYPE Auto-K 08/04/2010     BMP:    Lab Results   Component Value Date     04/02/2019    K 3.3 04/02/2019     04/02/2019    CO2 23 04/02/2019    BUN 11 04/02/2019    CREATININE <0.5 04/02/2019    GLUCOSE 106 04/02/2019    CALCIUM 8.9 04/02/2019    GFRAA >60 04/02/2019    GFRAA >60 06/13/2013    LABGLOM >60 04/02/2019     LFT:   Lab Results   Component Value Date    ALKPHOS 48 03/30/2019    ALT 18 03/30/2019    AST 26 03/30/2019    PROT 6.7 03/30/2019    PROT 7.4 11/04/2011    BILITOT 0.7 03/30/2019    BILIDIR <0.2 03/07/2019    IBILI see below 03/07/2019     Magnesium:    Lab Results   Component Value Date    MG 1.90 04/02/2019     Phosphorus:    Lab Results   Component Value Date    PHOS 3.8 11/01/2017     PT/INR:  No results found for: PTINR  U/A:    Lab Results   Component Value Date    LEUKOCYTESUR TRACE 03/30/2019    NITRITE Negative 06/14/2018    WBCUA 1 03/30/2019    RBCUA 2 03/30/2019    SPECGRAV 1.011 03/30/2019    UROBILINOGEN 0.2 03/30/2019    BILIRUBINUR Negative 03/30/2019 pulmonary disease) ([facility] [address] - stable without exacerbation.  Monitor     Essential (primary) hypertension - continue home meds and monitor blood pressure          DVT Prophylaxis: Xarelto  Diet: DIET GENERAL;  Code Status: Full Code    PT/OT Eval Status: Not Ordered    Dispo - Pt ready for discharge from IM standpoint; awaiting clearance from Yoselyn Sanderson MD

## 2019-04-02 NOTE — PROGRESS NOTES
Physical Therapy    Facility/Department: [address]  Initial Assessment    NAME: Foster Castellanos  : 1931  MRN: [id number]    Date of Service: 2019    Discharge Recommendations:Marysol Patricio scored a 20/24 on the AM-PAC short mobility form. Current research shows that an AM-PAC score of 18 or greater is typically associated with a discharge to the patient's home setting. Based on the patients AM-PAC score and their current functional mobility deficits, it is recommended that the patient have 2-3 sessions per week of Physical Therapy at d/c to increase the patients independence. HOME HEALTH CARE: LEVEL 1 STANDARD    - Initial home health evaluation to occur within 24-48 hours, in patient home   - Therapy to evaluate with goal of regaining prior level of functioning   - Therapy to evaluate if patient has [address] needs for personal care      PT Equipment Recommendations  Equipment Needed: No    Assessment   Body structures, Functions, Activity limitations: Decreased functional mobility ; Decreased strength;Decreased safe awareness;Decreased endurance;Decreased cognition;Decreased balance; Increased Pain  Assessment: Pt with decreased strength, mobility, balance. Pt needing skilled PT services to address these issues. Treatment Diagnosis: difficulty with gait. Prognosis: Good  Decision Making: Medium Complexity  Patient Education: PT POC, DC recommendations. REQUIRES PT FOLLOW UP: Yes  Activity Tolerance  Activity Tolerance: Patient limited by endurance; Patient Tolerated treatment well;Patient limited by fatigue       Patient Diagnosis(es): The primary encounter diagnosis was Syncope and collapse. A diagnosis of Hypokalemia was also pertinent to this visit.      has a past medical history of Anemia, Asthma, Blood circulation, collateral, Clostridium difficile diarrhea, Constipation, COPD (chronic obstructive pulmonary disease) ([address] DDD (degenerative disc disease), cervical, Essential hypertension, GERD (gastroesophageal reflux disease), History of traumatic head injury, Hypothyroidism, Major depressive disorder, Oropharyngeal dysphagia, Orthostatic hypotension, Osteopenia, Other disorders of kidney and ureter in diseases classified elsewhere, Pacemaker, Paroxysmal atrial fibrillation ([address] Partial epilepsy, with intractable epilepsy, pharmacoresistant ([address] Personal history of malignant neoplasm of cervix uteri, Personal history of PE (pulmonary embolism), Pneumonia, Post traumatic seizure ([address] Seizures ([address] Sinus node dysfunction ([address] and Spinal stenosis of lumbar region. has a past surgical history that includes refugio and bso (cervix removed) (9/2010); Tubal ligation; Rotator cuff repair (Right); Upper gastrointestinal endoscopy (02/2016); Colonoscopy (04/2013); pacemaker placement (Left); and pacemaker placement (Left). Restrictions  Restrictions/Precautions  Restrictions/Precautions: Fall Risk(med fall risk)  Position Activity Restriction  Other position/activity restrictions: Irena Calvillo is a 80 y.o. female who presents emergency Department with concern for cough. The patient reports that she woke up with this. She thinks that one of her pills that she took last night got stuck in her throat. She does report a reflux type burning in her chest.  She has tried no over-the-counter prescribed remedies for symptoms. Her daughter is at bedside and provides further history. She reports that she's lost 7 or 8 pounds over the past week. There is no specific reason for this. They had a PCP appointment this morning which they missed in favor of coming to the ER. The daughter reports that the patient woke up this morning and could only see cold and purple colors. She also reports that her blood pressure was low, 57/41.   The patient denies fever, rash, headaches, dizziness, chest pain, shortness of breath, congestion, abdominal pain, nausea, vomiting, diarrhea, constipation, blood in the stool, or painful urination. Family at bedside. Vision/Hearing  Vision: Impaired  Vision Exceptions: (blurry vision since admission)  Hearing: Exceptions to [facility]  Hearing Exceptions: Hard of hearing/hearing concerns(pt reports recent change in hearing. )     Subjective  General  Chart Reviewed: Yes  Additional Pertinent Hx: Pt with low BP and confusion. Longstanding history of orthostatic issues. See neurology regularly for TBI and seizure issues. Response To Previous Treatment: Patient with no complaints from previous session. Family / Caregiver Present: Yes(OT for eval)  Diagnosis: syncope collapse  Follows Commands: Within Functional Limits  General Comment  Comments: Pt seated in chair upon arrival.   Subjective  Subjective: Pt reports pain R side neck, feeling fatigued.    Pain Screening  Patient Currently in Pain: Yes  Pain Assessment  Pain Assessment: 0-10  Pain Level: 9  Pain Type: Acute pain  Pain Location: Shoulder;Neck  Vital Signs  Patient Currently in Pain: Yes       Orientation  Orientation  Overall Orientation Status: Impaired  Orientation Level: Oriented to place;Oriented to person;Disoriented to time;Disoriented to situation(unable to recall month)  Social/Functional History  Social/Functional History  Lives With: Family(dtr and son-in-law)  Type of Home: House  Home Layout: Two level, Able to Live on Main level with bedroom/bathroom(wang for stairs)  Home Access: Stairs to enter with rails(1 TOMMY)  Bathroom Shower/Tub: Shower chair with back, Walk-in shower  Bathroom Toilet: Handicap height  Bathroom Equipment: Hand-held shower, Tub transfer bench, Grab bars in shower, Built-in shower seat  Bathroom Accessibility: Accessible  Home Equipment: 4 wheeled walker, Cane, Wheelchair-manual, Reacher, Alert Button(transport WC)  ADL Assistance: Needs assistance  Homemaking Assistance: Needs assistance  Homemaking Responsibilities: No  Ambulation Assistance: Needs assistance  Transfer Assistance: Independent  Active : No  Leisure & Hobbies: Pt watches TV  Additional Comments: Pt reports several near fall just prior to this admission but no other falls past 6 mo. Son lived with pt for last 3 years. Pt 3 wks ago moved in to daughters home. Daughter reports sharp physical and cognitive decline in past 2 weeks. Cognition        Objective     Observation/Palpation  Posture: Good    AROM RLE (degrees)  RLE AROM: WFL  AROM RUE (degrees)  RUE AROM : WFL  Strength RLE  Comment: grossly -4/5  Strength LLE  Comment: grossly -4/5     Sensation  Overall Sensation Status: WFL  Bed mobility  Supine to Sit: Unable to assess  Comment: Pt in chair and remained in chair at end of treatment. Transfers  Sit to Stand: Supervision  Stand to sit: Supervision  Ambulation  Ambulation?: Yes  Ambulation 1  Surface: level tile  Device: Rolling Walker  Assistance: Stand by assistance;Contact guard assistance  Quality of Gait: steady gait  Distance: Pt amb with RW and SBA for 200 ft, then CGA for 10 ft x 2. Comments: Pt did well during hallway amb. Then once back to room pt with post sway and near LOB. CGA needed to get safely to chair. Pt vitals taken and 94% O2 sats, /67. After a few min rest, pt ok to continue with bathing in bathroom with OT. Took seated rest breaks during bathing. Stairs/Curb  Stairs?: No     Balance  Posture: Good  Sitting - Static: Good  Sitting - Dynamic: Good  Standing - Static: Fair;+  Standing - Dynamic: Fair;+(With RW)  Comments: Pt did have one episode of post LOB needing CGA to maintain. Pt not aware of balance issues.          Plan   Plan  Times per week: 3-5x/week  Times per day: Daily  Current Treatment Recommendations: Strengthening, Balance Training, Functional Mobility Training, Transfer Training, Endurance Training, Gait Training, Stair training, Safety Education & Training, Home Exercise Program, Pain Management, Cognitive Reorientation,

## 2019-04-02 NOTE — PROGRESS NOTES
[facility] [address]   Electrophysiology Progress Note     Admit Date: 3/30/2019     Reason for follow up: Syncope     HPI and Interval History: 80 y.o. history of recurrent syncope due to hypotension, history of sinus node dysfunction s/p dual chamber pacemaker on 2016 ([employer]), history of seizures since head injury in 2005, normal coronary anatomy with cath in 2018. She also has history of PAF and is on Xarelto and Flecainide. She has presented to the hospital with syncope. She has low BP and has had syncope in the past.     Patient sitting and having her breakfast.  Denies having any chest pain or shortness of breath. Patient seen and examined. Clinical notes reviewed. Telemetry reviewed. No new complaint today. No major events overnight. Review of System:  All other systems reviewed except for that noted above. Pertinent negatives and positives are:     · General: negative for fever, chills   · Ophthalmic ROS: negative for - eye pain or loss of vision  · ENT ROS: negative for - headaches, sore throat   · Respiratory: negative for - cough, sputum  · Cardiovascular: Reviewed in HPI  · Gastrointestinal: negative for - abdominal pain, diarrhea, N/V  · Hematology: negative for - bleeding, blood clots, bruising or jaundice  · Genito-Urinary:  negative for - Dysuria or incontinence  · Musculoskeletal: negative for - Joint swelling, muscle pain  · Neurological: negative for - confusion, ++dizziness, headaches   · Psychiatric: No anxiety, no depression.   · Dermatological: negative for - rash      Physical Examination:  Vitals:    19 0855   BP: 107/61   Pulse: 64   Resp: 17   Temp: 97.3 °F (36.3 °C)   SpO2: 94%      In: 240 [P.O.:240]  Out: 775    Wt Readings from Last 3 Encounters:   19 109 lb 9.6 oz (49.7 kg)   19 109 lb 12.8 oz (49.8 kg)   19 107 lb (48.5 kg)     Temp  Av.7 °F (36.5 °C)  Min: 97.3 °F (36.3 °C)  Max: 98.5 °F (36.9 °C)  Pulse  Av  Min: 60  Max: 74  BP Min: 107/61  Max: 197/84  SpO2  Av %  Min: 94 %  Max: 96 %    Intake/Output Summary (Last 24 hours) at 2019 1000  Last data filed at 2019 0836  Gross per 24 hour   Intake 480 ml   Output 925 ml   Net -445 ml     · Constitutional: Oriented. No distress. · Head: Normocephalic and atraumatic. · Mouth/Throat: Oropharynx is clear and moist.   · Eyes: Conjunctivae normal. EOM are normal.   · Neck: Neck supple. No JVD present. · Cardiovascular: Normal rate, regular rhythm, S1&S2. · Pulmonary/Chest: Bilateral respiratory sounds. No rhonchi. · Abdominal: Soft. No tenderness. · Musculoskeletal: No tenderness. No edema    · Lymphadenopathy: Has no cervical adenopathy. · Neurological: Alert and oriented. Follows command, No Gross deficit   · Skin: Skin is warm, No rash noted. · Psychiatric: Has a normal behavior     Labs, diagnostic and imaging results reviewed. Reviewed. Recent Labs     19  0748 19  0450 19  0504    142 142   K 3.5 3.2* 3.3*    108 108   CO2 24 22 23   BUN 19 14 11   CREATININE 0.6 0.5* <0.5*     Recent Labs     19  0748 19  0450 19  0504   WBC 10.8 9.1 6.5   HGB 11.4* 11.8* 11.8*   HCT 33.6* 34.3* 34.2*   MCV 92.6 93.9 92.0    214 222     Lab Results   Component Value Date    TROPONINI <0.01 2019     CrCl cannot be calculated (This lab value cannot be used to calculate CrCl because it is not a number: <0.5).    No results found for: BNP  Lab Results   Component Value Date    PROTIME 22.4 02/10/2019    PROTIME 13.7 2019    PROTIME na 2019    INR 1.96 02/10/2019    INR 1.20 2019    INR na 2019     Lab Results   Component Value Date    CHOL 178 2019    HDL 54 2019    TRIG 97 2019       Scheduled Meds:   midodrine  5 mg Oral TID WC    potassium chloride  20 mEq Oral Once    rivaroxaban  15 mg Oral Daily    sertraline  100 mg Oral Daily    megestrol  200 mg Oral Daily    levothyroxine  50 mcg Oral Daily    levETIRAcetam  750 mg Oral BID    flecainide  50 mg Oral 2 times per day    sodium chloride flush  10 mL Intravenous 2 times per day     Continuous Infusions:  PRN Meds:metoprolol tartrate, sodium chloride flush, magnesium hydroxide, ondansetron, acetaminophen     Patient Active Problem List    Diagnosis Date Noted    Unstable angina ([facility] [address] 07/11/2018     Priority: High    Essential hypertension 01/18/2016     Priority: High    Orthostatic hypotension 03/02/2015     Priority: High    Partial epilepsy with intractable epilepsy ([facility] [address] 08/19/2014     Priority: High    COPD (chronic obstructive pulmonary disease) ([facility] [address] 06/06/2013     Priority: High    Post traumatic seizure ([facility] [address] 06/06/2013     Priority: High    Anemia 05/31/2016     Priority: Medium    Hypothyroidism 03/19/2015     Priority: Medium    Constipation 03/02/2015     Priority: Low    Spinal stenosis of lumbar region 07/07/2014     Priority: Low    DDD (degenerative disc disease), lumbar 06/06/2013     Priority: Low    Osteopenia 06/06/2013     Priority: Low    Syncope and collapse 03/30/2019    Nausea and vomiting 03/30/2019    Abnormal EKG 03/30/2019    Hypokalemia     Dysarthria 01/03/2019    Hypertensive emergency     Hypertensive urgency 12/13/2018    Aspiration into airway 10/11/2018    Chest pain 07/01/2018    Seizure-like activity (HCC)     Shaking     Partial seizure (HCC)     Mild episode of recurrent major depressive disorder ([facility] [address] 11/29/2017    SOB (shortness of breath)     COPD, mild ([facility] [address]     DDD (degenerative disc disease), cervical 08/07/2017    Paroxysmal atrial fibrillation ([facility] [address] 01/23/2017    Pacemaker 10/03/2016    Bradycardia     Sinus node dysfunction ([facility] [address] 09/26/2016    Oropharyngeal dysphagia 03/02/2016      Active Hospital Problems    Diagnosis Date Noted    Essential hypertension [I10] 01/18/2016     Priority: High    COPD (chronic obstructive pulmonary disease) ([facility] [J44.9] 06/06/2013     Priority: High    Hypothyroidism [E03.9] 03/19/2015     Priority: Medium    Syncope and collapse [R55] 03/30/2019    Nausea and vomiting [R11.2] 03/30/2019    Abnormal EKG [R94.31] 03/30/2019    Hypokalemia [E87.6]     Paroxysmal atrial fibrillation ([facility] [I48.0] 01/23/2017       Assessment and plan:     - Syncope:    No further syncope since admission. Has had orthostatic hypotension. D/c'd Florinef since has high BP and c/o of some swelling of LE.                Midodrine TID.                 D/c'd Aricept. Don't recommend using it since it is associated with syncope, hypotension, etc.              PT/OT   Complex medical condition due to intermittent orthostatic hypotension and also high blood pressure at times. Blood pressure today appears to be better controlled. D/c'd Florinef. - Severe hypokalemia: ? Etiology. Replace with potassium. Still low potassium today. Goal K ~ 4      - Sinus node dysfunction s/p PPM: Doubt arrhythmia is the cause of her syncope,. PPM checked and functions normally. Doing well.       - PAF: on Xarelto. Remains in sinus rhythm.      - PT/OT.      NOTE: This report was transcribed using voice recognition software. Every effort was made to ensure accuracy, however, inadvertent computerized transcription errors may be present.      Ирина Abernathy MD, MPH  [facility]   Office: [phone]

## 2019-04-02 NOTE — DISCHARGE INSTR - COC
(degenerative disc disease), lumbar M51.36    Osteopenia M85.80    Post traumatic seizure ([facility] [address] R56.1    Spinal stenosis of lumbar region M48.061    Partial epilepsy with intractable epilepsy ([facility] [address] G40.119    Orthostatic hypotension I95.1    Constipation K59.00    Hypothyroidism E03.9    Essential hypertension I10    Oropharyngeal dysphagia R13.12    Anemia D64.9    Sinus node dysfunction (HCC) I49.5    Bradycardia R00.1    Pacemaker Z95.0    Paroxysmal atrial fibrillation (HCC) I48.0    DDD (degenerative disc disease), cervical M50.30    SOB (shortness of breath) R06.02    COPD, mild (HCC) J44.9    Mild episode of recurrent major depressive disorder (HCC) F33.0    Shaking R25.1    Partial seizure (HCC) R56.9    Seizure-like activity (HCC) R56.9    Chest pain R07.9    Unstable angina ([facility]) I20.0    Aspiration into airway T17.908A    Hypertensive urgency I16.0    Hypertensive emergency I16.1    Dysarthria R47.1    Hypokalemia E87.6    Syncope and collapse R55    Nausea and vomiting R11.2    Abnormal EKG R94.31       Isolation/Infection:   Isolation          No Isolation            Nurse Assessment:  Last Vital Signs: /66   Pulse 60   Temp 97.5 °F (36.4 °C) (Oral)   Resp 17   Ht 5' 1\" (1.549 m)   Wt 109 lb 9.6 oz (49.7 kg)   SpO2 95%   BMI 20.71 kg/m²     Last documented pain score (0-10 scale): Pain Level: 9  Last Weight:   Wt Readings from Last 1 Encounters:   04/02/19 109 lb 9.6 oz (49.7 kg)     Mental Status:  oriented, alert and forgetful    IV Access:  - None    Nursing Mobility/ADLs:  Walking   Assisted  Transfer  Assisted  Bathing  Assisted  Dressing  Assisted  Toileting  Assisted  Feeding  Independent  Med Admin  Assisted  Med Delivery   whole and applesauce or pudding    Wound Care Documentation and Therapy:  Incision 09/29/16 Chest Left (Active)   Number of days: 914        Elimination:  Continence:   · Bowel:  Yes  · Bladder: Yes (occas urgency incontinence)  Urinary Catheter: None   Colostomy/Ileostomy/Ileal Conduit: No       Date of Last BM: 04/01/2019    Intake/Output Summary (Last 24 hours) at 4/2/2019 1451  Last data filed at 4/2/2019 0901  Gross per 24 hour   Intake 490 ml   Output 775 ml   Net -285 ml     I/O last 3 completed shifts: In: 36 [P.O.:720; I.V.:10]  Out: 800 [Urine:800]    Safety Concerns:     Sundowners [place] and At Risk for Falls    Impairments/Disabilities:      forgetful    Nutrition Therapy:  Current Nutrition Therapy:   - Oral Diet:  General    Routes of Feeding: Oral  Liquids: Thin Liquids  Daily Fluid Restriction: no  Last Modified Barium Swallow with Video (Video Swallowing Test): not done    Treatments at the Time of Hospital Discharge:   Respiratory Treatments: see medications for nebulizer and inhaler orders  Oxygen Therapy:  is not on home oxygen therapy.   Ventilator:    - No ventilator support    Rehab Therapies: Physical Therapy, Occupational Therapy and Nursing  Weight Bearing Status/Restrictions: No weight bearing restirctions  Other Medical Equipment (for information only, NOT a DME order):  walker  Other Treatments: Orthostatic VS with each Nursing visit      [address] [place] [address]: LEVEL 1 STANDARD     -Initial home health evaluation to occur within 24-48 hours, in patient home    -Home health agency to establish plan of care for patient over 60 day period    -Medication Reconciliation    -PCP Visit scheduled within seven days of discharge    -PT/OT to evaluate with goal of regaining prior level of functioning    -OT to evaluate if patient has [address] needs for personal care       Patient's personal belongings (please select all that are sent with patient):  None    RN SIGNATURE:  Electronically signed by Maurice Reeves RN on 4/2/19 at 3:02 PM    CASE MANAGEMENT/SOCIAL WORK SECTION    Inpatient Status Date: 03/30/2019    Readmission Risk Assessment Score:  Readmission Risk              Risk of Unplanned Readmission:        22           Discharging to Facility/ Agency   Name: Mandy Howell will call for Appointment  Phone: [phone]  Fax: [phone]    Dialysis Facility (if applicable)   · Name:  · Address:  · Dialysis Schedule:  · Phone:  · Fax:    / signature: Electronically signed by Emily Liriano RN on 4/2/19 at 3:02 PM    PHYSICIAN SECTION    Prognosis: Fair    Condition at Discharge: Stable    Rehab Potential (if transferring to Rehab):     Recommended Labs or Other Treatments After Discharge:   - Home health, PT/OT/SN    Physician Certification: I certify the above information and transfer of Annie Valle  is necessary for the continuing treatment of the diagnosis listed and that she requires Home Care for less 30 days.      Update Admission H&P: No change in H&P    PHYSICIAN SIGNATURE:  Electronically signed by Rafaela Askew MD on 4/2/19 at 2:59 PM

## 2019-04-02 NOTE — DISCHARGE SUMMARY
Hospitalist Discharge Summary     Fiona uTrner  : 1931  MRN: [id number]    Admit date: 3/30/2019  Discharge date: 2019    Admitting Physician: Elmira Pradhan MD    Discharge Diagnoses:   Patient Active Problem List   Diagnosis    COPD (chronic obstructive pulmonary disease) ([address]    DDD (degenerative disc disease), lumbar    Osteopenia    Post traumatic seizure ([address]    Spinal stenosis of lumbar region    Partial epilepsy with intractable epilepsy ([address]    Orthostatic hypotension    Constipation    Hypothyroidism    Essential hypertension    Oropharyngeal dysphagia    Anemia    Sinus node dysfunction (HCC)    Bradycardia    Pacemaker    Paroxysmal atrial fibrillation ([address]    DDD (degenerative disc disease), cervical    SOB (shortness of breath)    COPD, mild (HCC)    Mild episode of recurrent major depressive disorder ([address]    Shaking    Partial seizure ([address]    Seizure-like activity ([address]    Chest pain    Unstable angina ([address]    Aspiration into airway    Hypertensive urgency    Hypertensive emergency    Dysarthria    Hypokalemia    Syncope and collapse    Nausea and vomiting    Abnormal EKG       Admission Condition: fair    Discharged Condition: stable    Discharge Exam:  VITALS:  /66   Pulse 60   Temp 97.5 °F (36.4 °C) (Oral)   Resp 17   Ht 5' 1\" (1.549 m)   Wt 109 lb 9.6 oz (49.7 kg)   SpO2 95%   BMI 20.71 kg/m²   CONSTITUTIONAL:  awake, alert, cooperative, no apparent distress, and appears stated age  EYES:  Lids and lashes normal, PERRL, EOMI, sclera clear, conjunctiva normal  ENT:  NC/AT, MMM    NECK:  Supple, symmetrical, trachea midline, no adenopathy  HEMATOLOGIC/LYMPHATICS:  no cervical, supraclavicular or axillary lymphadenopathy  LUNGS:  clear to auscultation bilaterally, No increased work of breathing, good air exchange, no crackles or wheezing  CARDIOVASCULAR:  Regular rate and rhythm, normal S1 and S2, no S3 or S4, and no significant murmurs, rubs or gallops noted. Normal apical impulse,   ABDOMEN:  Normal active bowel sounds, soft, non-tender, non-distended, no masses palpated, no organomegally  MUSCULOSKELETAL:  Full range of motion noted. NEUROLOGIC:  Awake, alert, oriented to name, place and time. Cranial nerves II-XII are grossly intact. SKIN:  normal skin color, texture, turgor for age. Hospital Course:        Chief Complaint on Admission: passing out x 3  Chief diagnosis after evaluation: syncope and collapse     Brief Synopsis: Patient 80 y.o. woman with a history of hypertension, COPD, hypothyroidism and atrial fibrillation who was admitted on 3/30/2019 for treatment of syncope and collapse       Syncope and collapse - secondary to Orthostatic Hypotension.   - Cardiology consult appreciated     Orthostatic Hypotension   - EP consult appreciated  - Midodrine TID scheduled, DC'd Florinef  - Aricept DCd due to its association with syncope, hypotension, etc.     Sundowning/delirium, hallucinations - reorient; discharge to familiar surroundings as soon as able     Non-Intractable vomiting with nausea - None since admission; We provided symptomatic treatment with Zofran as needed, maintenance of fluids and electrolytes.     Abnormal EKG - ST depressions in the precordial leads. Pt denies chest pain; has been stable Telemetry and serial cardiac enzymes are negative   - Cardiology consult appreciated     Hypokalemia - initially severe; monitored Potassium and replaced as needed  - Will discharge on 15 days of Potassium, should be Ok without replacement after that.    - Follow closely with PCP     Acquired hypothyroidism - stable; continue Levothyroxine     Paroxysmal atrial fibrillation (HCC) - currently in sinus rhythm; will continue Metoprolol, Flecainide and Xarelto     COPD (chronic obstructive pulmonary disease) (HCC) - stable without exacerbation     Essential (primary) hypertension - continue home meds and monitor blood pressure         Consults: cardiology    Imaging Studies:  Xr Chest Standard (2 Vw)    Result Date: 3/30/2019  EXAMINATION: TWO VIEWS OF THE CHEST 3/30/2019 9:18 am COMPARISON: 03/07/2019 HISTORY: ORDERING SYSTEM PROVIDED HISTORY: Chest Discomfort TECHNOLOGIST PROVIDED HISTORY: Reason for exam:->Chest Discomfort Ordering Physician Provided Reason for Exam: Pt states pill got stuck in throat last night. Woke up with burning in throat and cough. Acuity: Acute Type of Exam: Initial FINDINGS: There is biapical and bibasilar scarring. The lungs are hyperexpanded. The cardiac silhouette is within normal limits status post dual lead pacemaker. There is no pneumothorax or pleural effusion     1. No acute abnormality. Xr Chest Standard (2 Vw)    Result Date: 3/7/2019  EXAMINATION: TWO VIEWS OF THE CHEST 3/7/2019 11:01 pm COMPARISON: Chest radiographs 02/10/2019, 01/11/2019 HISTORY: ORDERING SYSTEM PROVIDED HISTORY: eval for dyspna TECHNOLOGIST PROVIDED HISTORY: Reason for exam:->eval for dyspna Ordering Physician Provided Reason for Exam: Eval for dyspnea Acuity: Acute Type of Exam: Initial FINDINGS: Hyperinflated lungs. Biapical pleuroparenchymal scarring. No pneumothorax or pleural effusion. No focal airspace consolidation. Normal heart size and mediastinal contours. Intact dual lead pacemaker with left chest generator. Thoracic kyphoscoliosis with spondylosis. Bones are demineralized. No acute osseous abnormality. COPD. No focal airspace disease.        Other Significant Diagnostic Studies: As described above    Treatments: As described above    Disposition: home    Discharge Medications:     Christiane Lloyd   Home Medication Instructions NWL:[id number]    Printed on:04/02/19 1500   Medication Information                      Cranberry 1000 MG CAPS  Take 2 capsules by mouth daily              ferrous sulfate 325 (65 Fe) MG tablet  TAKE 1 TABLET BY MOUTH EVERY DAY WITH BREAKFAST             flecainide (TAMBOCOR) 50 MG tablet  Take 1 tablet by mouth every 12 hours             levalbuterol (XOPENEX HFA) 45 MCG/ACT inhaler  Inhale 1 puff into the lungs every 4 hours as needed for Wheezing             levalbuterol (XOPENEX) 0.63 MG/3ML nebulization  Take 3 mLs by nebulization every 4 hours as needed for Wheezing             levETIRAcetam (KEPPRA) 750 MG tablet  Take 1 tablet by mouth 2 times daily             levothyroxine (SYNTHROID) 50 MCG tablet  TAKE 1 TABLET BY MOUTH DAILY             megestrol (MEGACE) 40 MG/ML suspension  Take 5 mLs by mouth daily             metoprolol tartrate (LOPRESSOR) 25 MG tablet  Take 1 tablet by mouth 3 times daily as needed (systolic BP in right arm above 160)             midodrine (PROAMATINE) 5 MG tablet  Take 1 tablet by mouth 3 times daily (with meals)             multivitamin (THERAGRAN) per tablet  Take 1 tablet by mouth daily. nitroGLYCERIN (NITROSTAT) 0.4 MG SL tablet  up to max of 3 total doses. If no relief after 1 dose, call 911.             nystatin (MYCOSTATIN) 729924 UNIT/ML suspension  TAKE 5 MLS BY MOUTH 4 TIMES DAILY             potassium chloride (KLOR-CON M) 10 MEQ extended release tablet  Take 1 tablet by mouth 2 times daily for 15 days             sertraline (ZOLOFT) 100 MG tablet  Take 1 tablet by mouth daily             tiotropium (SPIRIVA RESPIMAT) 2.5 MCG/ACT AERS inhaler  Inhale 2 puffs into the lungs daily             vitamin B-12 (CYANOCOBALAMIN) 1000 MCG tablet  Take 1 tablet by mouth daily             XARELTO 15 MG TABS tablet  TAKE 1 TABLET BY MOUTH EVERY DAY WITH LARGE MEAL OF THE DAY                 35 Minutes spent on patient evaluation, counseling and discharge planning.      Signed:  Kush Schilling MD  4/2/2019, 3:00 PM

## 2019-04-02 NOTE — PLAN OF CARE
Problem: Falls - Risk of:  Goal: Absence of physical injury  Description  Absence of physical injury  4/1/2019 0544 by Saeid Irene RN  Outcome: Met This Shift
Problem: Falls - Risk of:  Goal: Will remain free from falls  Description  Will remain free from falls  3/31/2019 0507 by Salty Coker RN  Outcome: Ongoing  3/30/2019 2306 by Valencia Flores RN  Outcome: Ongoing     Problem: Pain:  Goal: Pain level will decrease  Description  Pain level will decrease  3/31/2019 0507 by Salty Coker RN  Outcome: Ongoing  3/30/2019 2306 by Valencia Flores RN  Outcome: Ongoing  Goal: Control of acute pain  Description  Control of acute pain  3/30/2019 2306 by Valencia Flores RN  Outcome: Ongoing
Problem: Falls - Risk of:  Goal: Will remain free from falls  Description  Will remain free from falls  3/31/2019 2352 by Emmy Starr RN  Note:   HIGH fall risk. SAFE to door, arm band in place, fall blanket on bed, siderails up x2, bed in lowest position and wheels locked. Pt is aware of needs, rings for assistance when ambulating - CGA. Callbell in reach and bed alarm on.
Problem: Falls - Risk of:  Goal: Will remain free from falls  Description  Will remain free from falls  Note:   MEDIUM fall risk. Pt is aware of needs, rings for assistance when needed. Family at bedside. SAFE to door, arm band in place, fall blanket on bed,nonskid socks on during ambulation, siderails up x2, bed in lowest position and wheels locked. Callbell in reach.      Problem: Respiratory  Goal: No pulmonary complications  Note:   Lung sounds clear - pt states she has been having a productive cough     Problem: Respiratory  Goal: O2 Sat > 90%  Note:   O2 sat 95 on room air
Problem: Falls - Risk of:  Goal: Will remain free from falls  Description  Will remain free from falls  Outcome: Ongoing  Bed alarm  and socks on and pt calls appropriately. Problem: Pain:  Goal: Pain level will decrease  Description  Pain level will decrease  Outcome: Ongoing     Problem: Pain:  Goal: Control of acute pain  Description  Control of acute pain  Outcome: Ongoing  Headache associated with increase blood pressures, PRN tylenol is controlling pain at this time.
clear, minimally, with weak DB effort  4/1/2019 2327 by Abhilash Drake, RN  Note:   Lung sounds clear - pt states she has been having a productive cough

## 2019-04-02 NOTE — PROGRESS NOTES
Occupational Therapy   Occupational Therapy Initial Assessment  Date: 2019   Patient Name: Carmen Wyatt  MRN: [id number]     : 1931    Date of Service: 2019    Discharge Recommendations:  Carmen Wyatt scored a 21/24 on the AM-PAC ADL Inpatient form. Current research shows that an AM-PAC score of 18 or greater is typically associated with a discharge to the patient's home setting. Based on the patients AM-PAC score and their current ADL deficits, it is recommended that the patient have 2-3 sessions per week of Occupational Therapy at d/c to increase the patients independence. HOME HEALTH CARE: LEVEL 1 STANDARD    - Initial home health evaluation to occur within 24-48 hours, in patient home   - Therapy to evaluate with goal of regaining prior level of functioning   - Therapy to evaluate if patient has [address] needs for personal care       OT Equipment Recommendations  Equipment Needed: No    Assessment   Performance deficits / Impairments: Decreased functional mobility ; Decreased ADL status; Decreased endurance;Decreased balance  Assessment: Pt is not at her baseline level of occupational function, based on the above deficits associated with syncope & collapse. Pt would benefit from continued skilled acute OT services to address these deficits. Treatment Diagnosis: Decreaesed ADL status, functional mobility, endurance and balance associated with syncope & collapse  Prognosis: Good  Decision Making: Low Complexity  History: Pt lives w/dtr, 79 yo, independent ADLs w/extra time, family does IADLs, no longer drives, rarely home alone. Several near falls recently.  PMH: COPD, cervical DDD, cervical CA, spinal stenosis, seizures, orthostatic hypotension, R rotator cuff repair, hx TBI, C-diff, anemia  Exam: ROM, MMT, 6 clicks, 4 performance deficits/impairments, evolving presentation  Assistance / Modification: SBA w/RW for functional mobility, transfers, SBA standing ADLs, needs rest breaks  Patient Education: OT eval, POC, d/c recommendation, energy conservation strategies  Barriers to Learning: Cognitive  REQUIRES OT FOLLOW UP: Yes  Activity Tolerance  Activity Tolerance: Patient limited by fatigue;Patient Tolerated treatment well  Activity Tolerance: Pt needed frequent rest breaks. BP after ambulation 119/67. Once incident of near LOB d/t posterior lean/fatigue after walk. Safety Devices  Safety Devices in place: Yes  Type of devices: Gait belt;Left in chair;Call light within reach;Nurse notified(daughter present.)           Patient Diagnosis(es): The primary encounter diagnosis was Syncope and collapse. A diagnosis of Hypokalemia was also pertinent to this visit. has a past medical history of Anemia, Asthma, Blood circulation, collateral, Clostridium difficile diarrhea, Constipation, COPD (chronic obstructive pulmonary disease) ([address] DDD (degenerative disc disease), cervical, Essential hypertension, GERD (gastroesophageal reflux disease), History of traumatic head injury, Hypothyroidism, Major depressive disorder, Oropharyngeal dysphagia, Orthostatic hypotension, Osteopenia, Other disorders of kidney and ureter in diseases classified elsewhere, Pacemaker, Paroxysmal atrial fibrillation ([address] Partial epilepsy, with intractable epilepsy, pharmacoresistant ([address] Personal history of malignant neoplasm of cervix uteri, Personal history of PE (pulmonary embolism), Pneumonia, Post traumatic seizure ([address] Seizures ([address] Sinus node dysfunction ([address] and Spinal stenosis of lumbar region. has a past surgical history that includes refugio and bso (cervix removed) (9/2010); Tubal ligation; Rotator cuff repair (Right); Upper gastrointestinal endoscopy (02/2016); Colonoscopy (04/2013); pacemaker placement (Left); and pacemaker placement (Left).     Treatment Diagnosis: Decreaesed ADL status, functional mobility, endurance and balance associated with syncope & collapse      Restrictions  Restrictions/Precautions  Restrictions/Precautions: Fall Risk(med fall risk)  Position Activity Restriction  Other position/activity restrictions: Subhash Richard is a 80 y.o. female who presents emergency Department with concern for cough. The patient reports that she woke up with this. She thinks that one of her pills that she took last night got stuck in her throat. She does report a reflux type burning in her chest.  She has tried no over-the-counter prescribed remedies for symptoms. Her daughter is at bedside and provides further history. She reports that she's lost 7 or 8 pounds over the past week. There is no specific reason for this. They had a PCP appointment this morning which they missed in favor of coming to the ER. The daughter reports that the patient woke up this morning and could only see cold and purple colors. She also reports that her blood pressure was low, 57/41. The patient denies fever, rash, headaches, dizziness, chest pain, shortness of breath, congestion, abdominal pain, nausea, vomiting, diarrhea, constipation, blood in the stool, or painful urination. Family at bedside.     Subjective   General  Chart Reviewed: Yes  Family / Caregiver Present: Yes(daughter, Rock Lopez)  Referring Practitioner: Howie Burris MD, for d/c planning  Subjective  Subjective: Pt upright in chair upon arrival. Agreeable to therapy eval.   Pain Assessment  Pain Assessment: 0-10  Pain Level: 9  Pain Type: Acute pain  Pain Location: Shoulder, Neck (RN notified)  Oxygen Therapy  SpO2: 95 %  Pulse Oximeter Device Mode: Intermittent  Pulse Oximeter Device Location: Finger  O2 Device: None (Room air)  Social/Functional History  Social/Functional History  Lives With: Family(dtr and son-in-law)  Type of Home: House  Home Layout: Two level, Able to Live on Main level with bedroom/bathroom(wang for stairs)  Home Access: Stairs to enter with rails(1 TOMMY)  Bathroom Shower/Tub: Shower chair with Rolling Walker  Activity: Other; To/from bathroom  Assist Level: Stand by assistance  Toilet Transfers  Toilet - Technique: Ambulating  Equipment Used: Standard bedside commode  Toilet Transfer: Stand by assistance  Toilet Transfers Comments: Verbal cues for hand placement before sitting/standing   ADL  Grooming: Setup;Stand by assistance(Standing at sink washing hands, face, burshing teeth. Sitting on BSC combing hair. )  UE Bathing: Setup;Supervision(seated on BSC in front of sink)  LE Bathing: Setup;Stand by assistance  UE Dressing: Supervision(doffing/donning pajama shirt with buttons)  LE Dressing: Stand by assistance;Setup(doffing/donning pajama pants, brief, socks sitting on BSC; verbal cues to sit to doff pants for safety)  Additional Comments: Pt educated on energy conservation during bathing and dressing. Pt verbalized understanding. Tone RUE  RUE Tone: Normotonic  Tone LUE  LUE Tone: Normotonic  Coordination  Movements Are Fluid And Coordinated: Yes     Bed mobility  Supine to Sit: Unable to assess  Comment: Pt in chair and remained in chair at end of treatment. Transfers  Stand Step Transfers: Stand by assistance(w/RW)  Sit to stand: Stand by assistance(verbal cues for hand placement)  Stand to sit: Stand by assistance  Vision - Basic Assessment  Prior Vision: No visual deficits  Visual History: No significant visual history  Patient Visual Report: Other (add comment)(blurry vision since admission, worse in R eye)  Visual Field Cut: No  Oculo Motor Control: (WFL; pt tracked to R, then back to center X 2 trials; unsure if oculomotor deficits or pt just looking at OT)  Cognition  Overall Cognitive Status: Exceptions  Arousal/Alertness: Appropriate responses to stimuli  Following Commands:  Follows multistep commands consistently  Attention Span: Appears intact  Memory: Decreased recall of recent events  Insights: Decreased awareness of deficits  Initiation: Does not require cues  Sequencing: Does not require cues  Cognition Comment: Disoriented to month and situation  Perception  Overall Perceptual Status: WFL     Sensation  Overall Sensation Status: WFL        LUE AROM (degrees)  LUE AROM : WFL  Left Hand AROM (degrees)  Left Hand AROM: WFL  Left Hand General AROM: arthric changes in fingers  RUE AROM (degrees)  RUE AROM : WFL  Right Hand AROM (degrees)  Right Hand AROM: WFL  Right Hand General AROM: arthritic changes in fingers  LUE Strength  Gross LUE Strength: WFL(4+/5 elbow, NT shoulder)  L Hand Grasp: 4+/5  RUE Strength  Gross RUE Strength: WFL(4+/5 elbow, NT shoulder)  R Hand Grasp: 4+/5                   Plan   Plan  Times per week: 3-5  Times per day: Daily  Current Treatment Recommendations: Endurance Training, Patient/Caregiver Education & Training, Cognitive Reorientation, Self-Care / ADL, Functional Mobility Training, Safety Education & Training             AM-PAC Score        AM-[facility] Inpatient Daily Activity Raw Score: 21  AM-PAC Inpatient ADL T-Scale Score : 44.27  ADL Inpatient CMS 0-100% Score: 32.79  ADL Inpatient CMS G-Code Modifier : CJ    Goals  Short term goals  Time Frame for Short term goals: Discharge  Short term goal 1: Mod I for functional transfers to ADL surfaces w/RW  Short term goal 2: Mod I for functional mobility w/RW for ADL  Short term goal 3: Mod I for UB ADLs, including item retrieval, as needed  Short term goal 4: Mod I for LB ADLs, including item retrieval, as needed  Short term goal 5: Pt to tolerate standing 5-7 min for ADL activity       Therapy Time   Individual Concurrent Group Co-treatment   Time In 1000         Time Out 1053         Minutes 53               Timed Code Treatment Minutes:  38    Total Treatment Minutes: [address], [address], OTR/L, [id number]

## 2019-04-03 NOTE — CARE COORDINATION
[address] Transitions Initial Follow Up Call    Call within 2 business days of discharge: Yes    Patient: Neelima Setvens Patient : 1931   MRN: [id number]  Reason for Admission: syncope and collapse  Discharge Date: 19 RARS: Readmission Risk Score: 22       Spoke with: pt's daughter, Digna Mora: MHFF    Non-face-to-face services provided:  Obtained and reviewed discharge summary and/or continuity of care documents  1111F completed    Care Transitions 24 Hour Call    Do you have any ongoing symptoms?:  No  Do you have a copy of your discharge instructions?:  Yes  Do you have all of your prescriptions and are they filled?:  Yes  Have you been contacted by a [address]?:  No  Have you scheduled your follow up appointment?:  Yes  How are you going to get to your appointment?:  Car - family or friend to transport  Were you discharged with any Home Care or Post Acute Services:  Yes  Post Acute Services:  Home Health (Comment: [facility])  Patient DME:  Abdullahi Sos, Straight cane, Wheelchair  Do you have support at home?:  Child  Do you feel like you have everything you need to keep you well at home?:  Yes  Are you an active caregiver in your home?:  No  Care Transitions Interventions  No Identified Needs       Pt's daughter states pt is doing well, still sleeping. Has new med, reviewed all others. HC has called and will be calling back with scheduled time. F/U appt listed below.  Agreed to more CTC f/u calls      Follow Up  Future Appointments   Date Time Provider Vikki Alcala   2019 11:30 AM Reena Fraga MD PULM & CC [facility]   2019 11:00 AM Thomas Lange MD [facility]   2019 11:40 AM Lalito Jones MD FF NEURO [facility]       Lainey Rodrigues, RN

## 2019-04-04 NOTE — PROGRESS NOTES
Occupational Therapy Discharge Summary    Name: Lou Will  : 1931    The pt was evaluated by OT on 19 and seen for initial evaluation only, prior to DC home on 4/3/19, per MD order. The pt's acute therapy goals were:    Short term goals  Time Frame for Short term goals: Discharge  Short term goal 1: Mod I for functional transfers to ADL surfaces w/RW  Short term goal 2: Mod I for functional mobility w/RW for ADL  Short term goal 3: Mod I for UB ADLs, including item retrieval, as needed  Short term goal 4: Mod I for LB ADLs, including item retrieval, as needed  Short term goal 5: Pt to tolerate standing 5-7 min for ADL activity        Patient met 0 goals during stay. Number of Refusals:0  Number of Holds: 0    During this hospitalization, the patient was educated on:  Patient Education: OT eval, POC, d/c recommendation, energy conservation strategies    DC pt from OT caseload at this time.   Thank you, Rochelle Rice, OTR/L, [id number]

## 2019-04-05 NOTE — PROGRESS NOTES
Physical Therapy  Physical Therapy Discharge Summary    Name: Shelby Zavaleta  : 1931    The pt was evaluated by PT on  and seen for 0 treatment sessions prior to DC to home on  per MD order. The pt's acute therapy goals were:  Short term goals  Time Frame for Short term goals: To be met by DC. Short term goal 1: Pt to perform bed mob independent. Short term goal 2: Pt to perform transfers with mod I. Short term goal 3: Pt to amb with supervision and RW for 300 ft. Short term goal 4: Pt to amb 1 step with supervision. Long term goals  Time Frame for Long term goals : LTGs=STGs    Patient met 0 goals during stay. Number of Refusals:0  Number of Holds: 0  During this hospitalization, the patient was educated on:  Patient Education: PT POC, DC recommendations. DC pt from PT caseload at this time.   Thank you, Filemon King, PT, DPT, [id number]

## 2019-04-10 NOTE — PROGRESS NOTES
Subjective:      Patient ID: Beulah Keller is a 80 y.o. female. CC: Patient presents for hospital follow-up. HPI pt is here for a hospital follow up. Patient was initially admitted to [facility] March 30 through April 2. She then went to [facility] yesterday due to passing out. Pt is here with daughter and states pt has been seen also F due to passing out in the last week. Pt did had a care  coordinator phone contact on 04/03/19. Daughter states pt passed out yesterday right after getting home from hospital. Pt does have bruise ribs. Pt was found to have pneumonia. Pt has been checking her blood pressure at home for a week now and it has been running at 80/55. Pt has also loss a lot of wt in a month. During hospitalization patient was started on midodrine and she still continued to have low blood pressures at home. She was taken off her statin medication. Daughter's concern that she's having increasing depression and she has decreased appetite. She would like the Zoloft medication adjusted and 2 increase the Megace medication to higher doses well. Patient apparently is sleeping about 20 hours a day. Coming off the air sats is not particular change things in regards to her appetite.     Review of Systems  Patient Active Problem List   Diagnosis    COPD (chronic obstructive pulmonary disease) ([address]    DDD (degenerative disc disease), lumbar    Osteopenia    Post traumatic seizure ([address]    Spinal stenosis of lumbar region    Partial epilepsy with intractable epilepsy ([address]    Orthostatic hypotension    Constipation    Hypothyroidism    Essential hypertension    Oropharyngeal dysphagia    Anemia    Sinus node dysfunction (HCC)    Bradycardia    Pacemaker    Paroxysmal atrial fibrillation ([address]    DDD (degenerative disc disease), cervical    SOB (shortness of breath)    COPD, mild (HCC)    Mild episode of recurrent major depressive disorder ([address]    Shaking  Partial seizure ([address]    Seizure-like activity ([address]    Chest pain    Unstable angina (HCC)    Aspiration into airway    Hypertensive urgency    Hypertensive emergency    Dysarthria    Hypokalemia    Syncope and collapse    Nausea and vomiting    Abnormal EKG       Outpatient Medications Marked as Taking for the 4/10/19 encounter (Office Visit) with Lizeth Schroeder MD   Medication Sig Dispense Refill    midodrine (PROAMATINE) 5 MG tablet Take 1 tablet by mouth 3 times daily (with meals) 90 tablet 3    potassium chloride (KLOR-CON M) 10 MEQ extended release tablet Take 1 tablet by mouth 2 times daily for 15 days 30 tablet 0    nystatin (MYCOSTATIN) 636958 UNIT/ML suspension TAKE 5 MLS BY MOUTH 4 TIMES DAILY (Patient taking differently: Take 500,000 Units by mouth 4 times daily as needed ) 140 mL 5    XARELTO 15 MG TABS tablet TAKE 1 TABLET BY MOUTH EVERY DAY WITH LARGE MEAL OF THE DAY 90 tablet 3    metoprolol tartrate (LOPRESSOR) 25 MG tablet Take 1 tablet by mouth 3 times daily as needed (systolic BP in right arm above 160) 60 tablet 3    ferrous sulfate 325 (65 Fe) MG tablet TAKE 1 TABLET BY MOUTH EVERY DAY WITH BREAKFAST 30 tablet 12    levalbuterol (XOPENEX) 0.63 MG/3ML nebulization Take 3 mLs by nebulization every 4 hours as needed for Wheezing 360 mL 5    flecainide (TAMBOCOR) 50 MG tablet Take 1 tablet by mouth every 12 hours 60 tablet 12    sertraline (ZOLOFT) 100 MG tablet Take 1 tablet by mouth daily 30 tablet 12    megestrol (MEGACE) 40 MG/ML suspension Take 5 mLs by mouth daily 240 mL 6    tiotropium (SPIRIVA RESPIMAT) 2.5 MCG/ACT AERS inhaler Inhale 2 puffs into the lungs daily 1 Inhaler 12    nitroGLYCERIN (NITROSTAT) 0.4 MG SL tablet up to max of 3 total doses.  If no relief after 1 dose, call 911. 25 tablet 3    levothyroxine (SYNTHROID) 50 MCG tablet TAKE 1 TABLET BY MOUTH DAILY 90 tablet 3    levalbuterol (XOPENEX HFA) 45 MCG/ACT inhaler Inhale 1 puff into the lungs every 4 hours as needed for Wheezing 1 Inhaler 3    Cranberry 1000 MG CAPS Take 2 capsules by mouth daily       levETIRAcetam (KEPPRA) 750 MG tablet Take 1 tablet by mouth 2 times daily 360 tablet 3    vitamin B-12 (CYANOCOBALAMIN) 1000 MCG tablet Take 1 tablet by mouth daily 30 tablet 3    multivitamin (THERAGRAN) per tablet Take 1 tablet by mouth daily. Allergies   Allergen Reactions    Cymbalta [Duloxetine Hcl] Other (See Comments)     Sleepy dizzy    Sulfa Antibiotics      Doesn't remember       Social History     Tobacco Use    Smoking status: Never Smoker    Smokeless tobacco: Never Used   Substance Use Topics    Alcohol use: No     Alcohol/week: 0.0 oz       /70 (Site: Left Upper Arm, Position: Sitting, Cuff Size: Medium Adult)   Pulse 75   Resp 12   Wt 107 lb 6 oz (48.7 kg)   SpO2 94%   BMI 20.29 kg/m²     Objective:   Physical Exam   Constitutional: She appears well-developed and well-nourished. She is cooperative. No distress. Neck: Carotid bruit is not present. Cardiovascular: Normal rate, regular rhythm and normal heart sounds. No murmur heard. Pulses:       Dorsalis pedis pulses are 2+ on the right side, and 2+ on the left side. Posterior tibial pulses are 2+ on the right side, and 2+ on the left side. Pulmonary/Chest: Effort normal and breath sounds normal. She exhibits tenderness (left rib cage). Musculoskeletal: Normal range of motion. She exhibits no edema or tenderness. Neurological: She is alert. She displays atrophy. No cranial nerve deficit or sensory deficit. Assessment:      Corinda Holstein was seen today for follow-up from hospital.    Diagnoses and all orders for this visit:    Orthostatic hypotension    Mild episode of recurrent major depressive disorder (HCC)    Syncope and collapse    Weight loss    Hypokalemia    Other orders  -     potassium chloride (KLOR-CON M) 10 MEQ extended release tablet;  Take 1 tablet by mouth daily  -     midodrine (PROAMATINE) 5 MG tablet; Take 2 tablets by mouth 3 times daily  -     megestrol (MEGACE) 40 MG/ML suspension; Take 10 mLs by mouth daily  -     sertraline (ZOLOFT) 100 MG tablet; Take 1.5 tablets by mouth daily            Plan:      Hospital information and ER notes reviewed with patient and daughter  Advised to adjust medication as noted above with spacing of the midodrine medication to every 8 hours  Patient was also encouraged to eat salt on a regular basis with potato chips or snacks  Medical decision making of moderate complexity. Keep RTC appointment but call back in 2 weeks with an update    Please note that this chart was generated using Dragon dictation software. Although every effort was made to ensure the accuracy of this automated transcription, some errors in transcription may have occurred.               Laura Coleman

## 2019-04-11 NOTE — CARE COORDINATION
[address] Transitions Follow Up Call    2019    Patient: Rula Parent  Patient : 1931   MRN: <[id number]>  Reason for Admission: syncope and collapse  Discharge Date: 19 RARS: Readmission Risk Score: 25    Spoke with: daughter [facility] Transitions Subsequent and Final Call    Subsequent and Final Calls  Do you have any ongoing symptoms?:  Yes  Onset of Patient-reported symptoms:  Other  Patient-reported symptoms:  Other  Have your medications changed?:  Yes  Patient Reports:  Megace, midodrine, Kcl & zofolt dose were changed  Do you have any questions related to your medications?:  No  Do you currently have any active services?:  Yes  Are you currently active with any services?:  Home Health  Do you have any needs or concerns that I can assist you with?:  No  Identified Barriers:  Impairment  Care Transitions Interventions  No Identified Needs  Other Interventions:        Called & spoke with Stuart Edmond, daughter, for the sub transition call. Stuart Edmond reported the pt \"passed out\" 2 X on . Stuart Edmond was able to assist the pt from falling down once. Pt was taken to the ER on . Stuart Edmond reported the pt BP this AM was 82/58, pt was sitting & C/O feeling dizzy. At 1:30 PM  BP was 130/78, pt was sitting. Pt becomes dizzy when she \"is upright\". Reminded Stuart Edmond the pt needs to get up slow, & wait a few min before. walking. Stuart Edmond understands to call the PCP if BP continues to decrease. Pt saw PCP yesterday & the midodrine was increased, Stuart Edmond understands. Reminded bettina to be sure the pt is taking in enough fluids. Stuart Edmond stated she does encourage fluids. Pt has a poor appetite, the megace was increased also, she did eat better today. Stuart Edmond understands the Zoloft was increased & the KCL was decreased    Stuart Edmond denied any needs or concerns. CTC will continue to follow.     Follow Up  Future Appointments   Date Time Provider Vikki Alcala   2019 11:00 AM Ludivina Mayer MD [facility] 5/23/2019 11:40 AM Donnie Alston MD [facility] NEURO [facility]       [address] Transition Coordinator  [phone]

## 2019-04-16 NOTE — TELEPHONE ENCOUNTER
Reason for Disposition   Systolic BP < 90 and feeling dizzy, lightheaded, or weak    Answer Assessment - Initial Assessment Questions  1. BLOOD PRESSURE: \"What is the blood pressure? \" \"Did you take at least two measurements 5 minutes apart?\"      78/54  2. ONSET: \"When did you take your blood pressure? \"      About 1 hour ago - pt took 2 midodrine this morning  3. HOW: \"How did you obtain the blood pressure? \" (e.g., visiting nurse, automatic home BP monitor)      *No Answer*  4. HISTORY: \"Do you have a history of low blood pressure? \" \"What is your blood pressure normally? \"      Yes, pt has h/o low BP and it has been low since released from the hospital .  5. MEDICATIONS: Rodney Pap you taking any medications for blood pressure? \" If yes: \"Have they been changed recently? \"      Dr Good Rincon increased pt's midodrine dose last Wednesday, but bp remains low. 6. PULSE RATE: \"Do you know what your pulse rate is? \"       *No Answer*  7. OTHER SYMPTOMS: \"Have you been sick recently? \" \"Have you had a recent injury? \"      Dizziness, weakness, extremely SOB with exertion . Caller also states pt is starting to forget daily things such as how to get dressed and showering. 8. PREGNANCY: \"Is there any chance you are pregnant? \" \"When was your last menstrual period? \"      *No Answer*    Protocols used: LOW BLOOD PRESSURE-ADULT-OH    Caller with report of low BP as documented above. Caller informed of recommendation per triage protocol - caller refuses ER at this time, and requests to have a message given to provider, to see if midodrine can be increased , rather than go to ER. Soft trx to pcp office, per caller request - spoke with JESUS Wynn, who states she will send the message as high priority to provider and for caller to expect a return phone call this afternoon. This writer informed caller of above, and she verbalizes understanding.

## 2019-04-16 NOTE — TELEPHONE ENCOUNTER
States pt is had low BP and pt passed out 4/9/19 and pt was taken to [facility]. She seen PCP on the 10th   and he changed her midodrine from 1 tab to 2 tab 3 x day. Now she is so weak and is collapsing with out support.  Please call Daughter Arnulfo Carpenter) to advise

## 2019-04-16 NOTE — TELEPHONE ENCOUNTER
Sathish Bynum notified and will contact cards. States she is not concerned as much with her memory, \" I know what is going on there. \"    Sathish Bynum states she is having to stay with her 24/7 and take her to the bathroom, put her on the toilet and get her back up again. Asked Sathish Bynum if she needs to have help at home, and she states she does not need help with her at this time.

## 2019-04-16 NOTE — TELEPHONE ENCOUNTER
Sasha Asencio notified that pt should go to the ER, she says when she takes her mom there they give her tylenol and send her on her way.   She fell last Wednesday, hit her head and was taken to the hospital, when they saw the pcp he increased the midodrine 10 mg TID, not sure if this should be done     They are asking what else they can do

## 2019-04-16 NOTE — TELEPHONE ENCOUNTER
She was also just evaluated by RMM at [facility] 2 weeks ago.   If she is feeling that week and collapsing, recommend going to ER and please let RMM know to see if he has any additional recs

## 2019-04-16 NOTE — TELEPHONE ENCOUNTER
Geovanni-Nurse triage is calling to see if patient's med below is able to be increase due to patient feeling dizzy and low BP-1 hour ago her BP was 78/54. Nova Graves states she advised Daria(patient's daughter) to call [phone], States patient does not want that because hospital will not due anything for her and the hospital is traumatizing   Ericka Porras stated pt has been forgetting to shower and how to get dressed, this is new for pt.    midodrine (PROAMATINE) 5 MG tablet 90 tablet 3 4/10/2019     Sig - Route:  Take 2 tablets by mouth 3 times daily - Oral    Class: Print

## 2019-04-17 NOTE — TELEPHONE ENCOUNTER
Spoke to pt daughter and she understood all instructions- she has an appt with Gretta Mcgowan on Thursday 25th of April at 10:45am - wants us to see her pt acts in the mornings

## 2019-04-17 NOTE — TELEPHONE ENCOUNTER
Restart florinef 0.1 mg daily. Compression stockings. BP checks at home. Continue same dose of midodrine for now. Please schedule her for a follow up.   It can be w/an NP

## 2019-04-17 NOTE — CARE COORDINATION
[facility] Transitions Follow Up Call    2019    Patient: Fiona Turner  Patient : 1931   MRN: <[id number]>  Reason for Admission: hypotention  Discharge Date: 19 RARS: Readmission Risk Score: 22         Spoke with: daughter Abdelrahman Rice  Daughter stated BP 78/56 and she taking Midrin 10mg 3xday. PCP told daughter to go to ER or see Cardiologist. Daughter is waiting for Cardiologist to contact her back. Daughter denies light sensitivity. Patient is fatigue. Denies other concerns or issues      Care Transitions Subsequent and Final Call    Subsequent and Final Calls  Do you have any ongoing symptoms?:  No  Have your medications changed?:  No  Do you have any questions related to your medications?:  No  Do you currently have any active services?:  No  Are you currently active with any services?:  Home Health  Do you have any needs or concerns that I can assist you with?:  No  Identified Barriers:  None  Care Transitions Interventions  No Identified Needs  Other Interventions:             Follow Up  Future Appointments   Date Time Provider Vikki Alcala   2019 11:00 AM Jody Bonner MD [facility]   2019 11:40 AM Bill Brandt MD FF NEURO [facility]   2019  2:30 PM Hayde HOLLEY FF Cardio [facility]   2019  2:30 PM Elle Ayala MD FF Cardio [facility]       Mylene Msoes RN

## 2019-05-03 NOTE — PROGRESS NOTES
[address]     Outpatient Follow Up Note    Sonia Cantu is 80 y.o. 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 She feels a tingle in her feet that works its way upward. She becomes shaky and wobbly. She passes out at least once a day. She's been bothered by this since Feb 9th  She wakes at 8 am, takes her pills, goes to the BR and her BP drops to 68/40 (starts at 110/70s). She becomes non-responsive. A lot of times she's see a ring before her eyes or blurred vision  Her best hours of the day after from 6 to 8 pm: she doesn't need her walker and she's smiley and happy. She's very very SOB after walking short distances and dressing. It started a couple of months ago and have progressively worsened. Her feet look red and swollen. She denies significant chest pain. The patient denies orthopnea/PND. The patients weight had gotten up to 117# then down to 103# over a couple of months. Her normal is ~ 105#. Its a struggle for her daughter to get her to eat. The patient has experienced palpitations a few times. Its woke her from sleep described as a odd sensation. These symptoms are new since the last OV. With regard to medication therapy the patient has been compliant with prescribed regimen. They have tolerated therapy to date.      Past Medical History:   Diagnosis Date    Anemia     Asthma     Blood circulation, collateral     Clostridium difficile diarrhea 10/21/2017    Constipation     COPD (chronic obstructive pulmonary disease) ([facility]     DDD (degenerative disc disease), cervical     Essential hypertension     GERD (gastroesophageal reflux disease)     History of traumatic head injury 05/2005    Hypothyroidism     Major depressive disorder     Oropharyngeal dysphagia     Orthostatic hypotension     Osteopenia     Other disorders of kidney and ureter in diseases classified elsewhere     Pacemaker     Paroxysmal atrial fibrillation (HCC)     Partial epilepsy, with intractable epilepsy, [facility]oresistant ([facility] [address]     Personal history of malignant neoplasm of cervix uteri 6/6/2013    Personal history of PE (pulmonary embolism)     post surgical    Pneumonia     Post traumatic seizure ([facility] [address]     Seizures ([facility] [address]     Sinus node dysfunction ([facility] [address]     Spinal stenosis of lumbar region      Social History:    Social History     Tobacco Use   Smoking Status Never Smoker   Smokeless Tobacco Never Used     Current Medications:  Current Outpatient Medications   Medication Sig Dispense Refill    fludrocortisone (FLORINEF) 0.1 MG tablet Take 0.1 mg by mouth daily      potassium chloride (KLOR-CON M) 10 MEQ extended release tablet Take 1 tablet by mouth daily 30 tablet 1    midodrine (PROAMATINE) 5 MG tablet Take 2 tablets by mouth 3 times daily 90 tablet 3    megestrol (MEGACE) 40 MG/ML suspension Take 10 mLs by mouth daily 360 mL 6    sertraline (ZOLOFT) 100 MG tablet Take 1.5 tablets by mouth daily 50 tablet 12    XARELTO 15 MG TABS tablet TAKE 1 TABLET BY MOUTH EVERY DAY WITH LARGE MEAL OF THE DAY 90 tablet 3    ferrous sulfate 325 (65 Fe) MG tablet TAKE 1 TABLET BY MOUTH EVERY DAY WITH BREAKFAST 30 tablet 12    levalbuterol (XOPENEX) 0.63 MG/3ML nebulization Take 3 mLs by nebulization every 4 hours as needed for Wheezing 360 mL 5    flecainide (TAMBOCOR) 50 MG tablet Take 1 tablet by mouth every 12 hours 60 tablet 12    tiotropium (SPIRIVA RESPIMAT) 2.5 MCG/ACT AERS inhaler Inhale 2 puffs into the lungs daily 1 Inhaler 12    levothyroxine (SYNTHROID) 50 MCG tablet TAKE 1 TABLET BY MOUTH DAILY 90 tablet 3    levalbuterol (XOPENEX HFA) 45 MCG/ACT inhaler Inhale 1 puff into the lungs every 4 hours as needed for Wheezing 1 Inhaler 3    Cranberry 1000 MG CAPS Take 2 capsules by mouth daily       levETIRAcetam (KEPPRA) 750 MG tablet Take 1 tablet by mouth 2 times daily 360 tablet 3    vitamin B-12 (CYANOCOBALAMIN) 1000 MCG tablet Take 1 tablet by mouth daily 30 tablet 3    multivitamin (THERAGRAN) per tablet Take 1 tablet by mouth daily.  nystatin (MYCOSTATIN) 593780 UNIT/ML suspension TAKE 5 MLS BY MOUTH 4 TIMES DAILY (Patient taking differently: Take 500,000 Units by mouth 4 times daily as needed ) 140 mL 5    metoprolol tartrate (LOPRESSOR) 25 MG tablet Take 1 tablet by mouth 3 times daily as needed (systolic BP in right arm above 160) 60 tablet 3    nitroGLYCERIN (NITROSTAT) 0.4 MG SL tablet up to max of 3 total doses. If no relief after 1 dose, call 911. 25 tablet 3     No current facility-administered medications for this visit. REVIEW OF SYSTEMS:    CONSTITUTIONAL: No major weight gain or loss, fatigue, weakness, night sweats or fever. HEENT: + new vision difficulties and hearing changes  RESPIRATORY: No new SOB, PND, orthopnea or cough. CARDIOVASCULAR: See HPI  GI: No nausea, vomiting, diarrhea, constipation, abdominal pain or changes in bowel habits. : No urinary frequency, urgency, incontinence hematuria or dysuria. SKIN: No cyanosis or skin lesions. MUSCULOSKELETAL: No new muscle or joint pain. NEUROLOGICAL: No syncope or TIA-like symptoms.   PSYCHIATRIC: No anxiety, pain, insomnia or depression    Objective:   PHYSICAL EXAM:        Vitals:    05/03/19 1039 05/03/19 1045 05/03/19 1110 05/03/19 1113   BP: 118/70 100/70 136/70 80/60   Position: Sitting Standing Sitting Standing   Pulse: 76      SpO2: 96%      Weight: 109 lb 3.2 oz (49.5 kg)      Height: 5' 1\" (1.549 m)          VITALS:  BP 80/60 (Position: Standing)   Pulse 76   Ht 5' 1\" (1.549 m)   Wt 109 lb 3.2 oz (49.5 kg)   SpO2 96%   BMI 20.63 kg/m²   CONSTITUTIONAL: Cooperative, no apparent distress, and appears well nourished / developed  NEUROLOGIC:  Awake and orientated to person, place and time. PSYCH: Calm affect. SKIN: Warm and dry. HEENT: Sclera non-icteric, normocephalic, neck supple, no elevation of JVP, normal carotid pulses with no bruits and thyroid normal size. LUNGS:  No increased work of breathing and clear to auscultation, no crackles or wheezing  CARDIOVASCULAR:  Regular rate 76 and rhythm with no murmurs, gallops, rubs, or abnormal heart sounds, normal PMI. The apical impulses not displaced  JVP less than 8 cm H2O  Heart tones are crisp and normal  Cervical veins are not engorged  The carotid upstroke is normal in amplitude and contour without delay or bruit  JVP is not elevated  ABDOMEN:  Normal bowel sounds, non-distended and non-tender to palpation  EXT: No edema, no calf tenderness. Pulses are present bilaterally.     DATA:    Lab Results   Component Value Date    ALT 18 03/30/2019    AST 26 03/30/2019    ALKPHOS 48 03/30/2019    BILITOT 0.7 03/30/2019     Lab Results   Component Value Date    CREATININE <0.5 (L) 04/02/2019    BUN 11 04/02/2019     04/02/2019    K 3.3 (L) 04/02/2019     04/02/2019    CO2 23 04/02/2019     Lab Results   Component Value Date    TSH 2.58 12/19/2017    H4QYIEB 7.1 11/04/2011     Lab Results   Component Value Date    WBC 6.5 04/02/2019    HGB 11.8 (L) 04/02/2019    HCT 34.2 (L) 04/02/2019    MCV 92.0 04/02/2019     04/02/2019     No components found for: CHLPL  Lab Results   Component Value Date    TRIG 97 01/04/2019    TRIG 70 01/14/2018    TRIG 90 07/25/2017     Lab Results   Component Value Date    HDL 54 01/04/2019    HDL 51 01/14/2018    HDL 69 (H) 07/25/2017     Lab Results   Component Value Date    LDLCALC 105 (H) 01/04/2019    LDLCALC 58 01/14/2018    LDLCALC 71 07/25/2017     Lab Results   Component Value Date    LABVLDL 19 01/04/2019    LABVLDL 14 01/14/2018    LABVLDL 18 07/25/2017     Radiology Review:  Pertinent images / reports were reviewed as a part of this visit and reveals the following:    [id number] Score for Atrial Fibrillation Stroke Risk   Risk   Factors  Component Value   C CHF No 0   H HTN Yes 1   A2 Age >= 76 Yes,  (80 y.o.) 2   D DM No 0   S2 Prior Stroke/TIA Yes 2   V Vascular Disease No 0   A Age 74-69 No,  (80 y.o.) 0   Sc Sex female 1    [id number]  Score  6   Score last updated 5/3/19 1:03 PM      Last Echo: Jan '19:  Summary  Left ventricle - normal size, thickness and function with EF of 70%  Mitral valve - trivial regurgitation  Pacer wire noted in right ventricle and right atrium. Tricuspid valve - mild regurgitation with RVSP estimated at 36 mmHg. Device interrogation: Aug '18:  AS-VS 99.7%  AS- < 0.1%  AP-VS 0.2%  AP- < 0.1%  Time in AT/AF : 0    Cardiac cath: July '18:  Findings:                      LM       Normal              LAD     Normal              Cx        Normal              RCA     Normal              LVG     60%              EDP     10 mmHg  Post Cath Dx:Normal cors         Last Stress Test: Oct '17:  Summary    Normal myocardial perfusion study.    Normal LV size and systolic function.         Assessment:      Diagnosis Orders   1. Orthostatic hypotension   ~persistent : no improvement after increasing midodrine and resuming florinef  ~over a 30 mmHg drop in SBP when standing  ~typically wears support stockings ; oral intake marginal despite megace and encouragement     2. Sinus node dysfunction (HCC)   ~s/p PPM  ~AP 0.2% by interrogation Aug '18    3. Paroxysmal atrial fibrillation (HCC)   ~reg to auscultation   ~DOAC : family not interested in stopping despite her fall risk   ~CHADs 6  ~no AF noted on last device interrogation  EKG 12 lead         I had the opportunity to review the clinical symptoms and presentation of Mcarthur Opitz. Plan:     1. Increase florinef to 0.1 mg bid  2. EKG: sinus rhythm 70  3.  F/U with EP in one week    Overall the patient is stable from CV standpoint    I have addresed the patient's cardiac risk factors and adjusted pharmacologic treatment as needed. In addition, I have reinforced the need for patient directed risk factor modification. Further evaluation will be based upon the patient's clinical course and testing results. All questions and concerns were addressed to the patient/daughter. Alternatives to my treatment were discussed. The patient is not currently smoking. The risks related to smoking were reviewed with the patient. Recommend maintaining a smoke-free lifestyle. Patient is on a beta-blocker : ordered prn for elevated BP. Has not needed to take  Patient is not on an ace-i/ARB : neg CHF  Patient is not on a statin : neg CAD / hyperlipidemia. Stopped at last hospitalization     anti-coagulation has been recommended / prescribed for this patient. Education conducted on adverse reactions including bleeding was discussed. The patient verbalizes understanding not to stop medications without discussing with us. Discussed exercise: deferred  Discussed diet. Thank you for allowing to us to participate in the care of Hillary Bond.     YOUSUF Oliver    Documentation of today's visit sent to PCP

## 2019-05-09 NOTE — PROGRESS NOTES
[address]   Electrophysiology   Date: 5/9/2019  I had the privilege of visiting Kody Bills in the office. CC: Orthostatic hypotension  HPI:  Previous note Renetta Hopkinslexi 5/13/19  1. Orthostatic hypotension   ~persistent : no improvement after increasing midodrine and resuming florinef  ~over a 30 mmHg drop in SBP when standing  ~typically wears support stockings ; oral intake marginal despite megace and encouragement      2. Sinus node dysfunction (HCC)   ~s/p PPM  ~AP 0.2% by interrogation Aug '18     3. Paroxysmal atrial fibrillation (HCC)   ~reg to auscultation   ~DOAC : family not interested in stopping despite her fall risk   ~CHADs 6  ~no AF noted on last device interrogation  EKG 12 lead       I had the opportunity to review the clinical symptoms and presentation of Kody Bills. Plan:      1. Increase florinef to 0.1 mg bid  2. EKG: sinus rhythm 70  3. F/U with EP in one week    Interval History:   Here for 1 week follow up after increased florinef. It has helped some. Not much. Positive ortho stats in office today with sitting systolic bp 107'L Dual chamber PPM interrogation showed no events. EKG SR.     Past Medical History:   Diagnosis Date    Anemia     Asthma     Blood circulation, collateral     Clostridium difficile diarrhea 10/21/2017    Constipation     COPD (chronic obstructive pulmonary disease) (HCC)     DDD (degenerative disc disease), cervical     Essential hypertension     GERD (gastroesophageal reflux disease)     History of traumatic head injury 05/2005    Hypothyroidism     Major depressive disorder     Oropharyngeal dysphagia     Orthostatic hypotension     Osteopenia     Other disorders of kidney and ureter in diseases classified elsewhere     Pacemaker     Paroxysmal atrial fibrillation (HCC)     Partial epilepsy, with intractable epilepsy, pharmacoresistant ([facility] [address]     Personal history of malignant neoplasm of cervix negative for - rash    Physical Examination:  Vitals:    05/09/19 1309   BP: 116/74   Pulse: 76        · Constitutional: Oriented. No distress. · Head: Normocephalic and atraumatic. · Mouth/Throat: Oropharynx is clear and moist.   · Eyes: Conjunctivae normal. EOM are normal.   · Neck: Neck supple. No rigidity. No JVD present. · Cardiovascular: Normal rate, regular rhythm, S1&S2. · Pulmonary/Chest: Bilateral respiratory sounds. No wheezes, No rhonchi. · Abdominal: Soft. Bowel sounds present. No distension, No tenderness. · Musculoskeletal: No tenderness. No edema    · Lymphadenopathy: Has no cervical adenopathy. · Neurological: Alert and oriented. Cranial nerve appears intact, No Gross deficit   · Skin: Skin is warm and dry. No rash noted. · Psychiatric: Has a normal behavior     Labs, diagnostic and imaging results reviewed.          Medication:  Current Outpatient Medications   Medication Sig Dispense Refill    fludrocortisone (FLORINEF) 0.1 MG tablet Take 1 tablet by mouth 2 times daily 60 tablet 1    potassium chloride (KLOR-CON M) 10 MEQ extended release tablet Take 1 tablet by mouth daily 30 tablet 1    midodrine (PROAMATINE) 5 MG tablet Take 2 tablets by mouth 3 times daily 90 tablet 3    megestrol (MEGACE) 40 MG/ML suspension Take 10 mLs by mouth daily 360 mL 6    sertraline (ZOLOFT) 100 MG tablet Take 1.5 tablets by mouth daily 50 tablet 12    nystatin (MYCOSTATIN) 029205 UNIT/ML suspension TAKE 5 MLS BY MOUTH 4 TIMES DAILY (Patient taking differently: Take 500,000 Units by mouth 4 times daily as needed ) 140 mL 5    XARELTO 15 MG TABS tablet TAKE 1 TABLET BY MOUTH EVERY DAY WITH LARGE MEAL OF THE DAY 90 tablet 3    metoprolol tartrate (LOPRESSOR) 25 MG tablet Take 1 tablet by mouth 3 times daily as needed (systolic BP in right arm above 160) 60 tablet 3    ferrous sulfate 325 (65 Fe) MG tablet TAKE 1 TABLET BY MOUTH EVERY DAY WITH BREAKFAST 30 tablet 12    levalbuterol (Rejeana Dues)

## 2019-05-09 NOTE — TELEPHONE ENCOUNTER
Pt saw JM today and needs to follow up w/ RMM in one month. He doesn't have anything open until aug.  Is there somewhere we can add her on?

## 2019-05-14 NOTE — TELEPHONE ENCOUNTER
Seen last week in office and given new med (mestinon). Her dizzy episodes have increased to four times a day . Today her b/p is 163/103. Should a med be added to this new med ? pls call nehemias

## 2019-05-15 NOTE — TELEPHONE ENCOUNTER
Pt daughter calling, NPJM advised her to call if the new medication Mestinon isn't working. Pt passed out twice today at 3:30am & again at 1:30pm.  Pls call to advise if a new medication will be ordered? Thank you.

## 2019-05-16 NOTE — PROGRESS NOTES
Subjective:      Patient ID: Annie Valle is a 80 y.o. female. HPI  Patient presents for a 2 month follow up for her orthostatic hypertension. Patient's daughter states patient has been passing out for the last 4 weeks at least 4 times a day. Patient's bp at home has been running around 78/56. She did have an episode where her bp was was 215/128 and patient took metoprolol 25mg and it brought her bp down 60/48. Wonders about only taking half when gets high but has not had to take it for high BP in weeks. Yesterday was the worst day she has had in weeks LOS for at least minute. Today has been a good day so far, no fainting spells but patient states she does have a HA. Patient did start a new medication last Friday, prescribed by cardiologist Dr. Masoud Flanagan,  Pyridostigmine. Has call out to him about yesterday BP and new meds. Has been having burning with urination x 3 days, some incontinence at night. No fever. No blood in urine. + frequency. Normal abd pain but nothing new or different. Diarrhea has been better recently but states it comes and goes. Eating well, taking megace regularly. States gets hungry now. Has been having more memory issues and hallucinations - things are upside down, confusing trees as people, etc. Struggling to remember how to do things, getting dressed, etc. States she has to stop and think \"what do I do next. \"     Best time of the day are 6PM-8Pm at night. Feels the best and BP the best.    Patient's medications, allergies, past medical, surgical, social and family histories were reviewed and updated in the EHR as appropriate.           Review of Systems    Objective:   Physical Exam  Vitals:    05/16/19 1107 05/16/19 1150   BP: (!) 178/96 (!) 184/90   Site: Left Upper Arm    Position: Sitting    Cuff Size: Medium Adult    Pulse: 60    SpO2: 96%    Weight: 108 lb (49 kg)      Wt Readings from Last 3 Encounters:   05/16/19 108 lb (49 kg)   05/09/19 106 lb (48.1 kg) 05/03/19 109 lb 3.2 oz (49.5 kg)     Body mass index is 20.41 kg/m². PHQ-9 Total Score: 0 (10/9/2018  4:28 PM)      GENERAL:Alert and oriented x 4 NAD, affect appropriate and slender, well hydrated, well developed. NECK:supple and non tender without mass, no thyromegaly or thyroid nodules, no cervical lymphadenopathy  LUNG:clear to auscultation bilaterally with normal respiratory effort  CV: Normal heart sounds, regular rate and rhythm without murmurs  EXTREMETY: no edema, wearing stockings, normal pedal pulses bilaterally      Assessment:          ASSESSMENT AND PLAN:       Jose L Ramos was seen today for hypertension. Diagnoses and all orders for this visit:    Orthostatic hypotension  F/u with cards  Discussed only taking 1/2 of the metoprolol instead of full tab when BP high    Dysuria  -     POC URINE with Microscopic  -     Urine Culture  UA ok  Will check cx    Late onset Alzheimer's disease without behavioral disturbance  Discussed meds, given heart issues would avoid aricept  Pt and daughter not interested in namenda at this time.     Failure to thrive in adult  Doing better, continue megace      RTO 3 months

## 2019-05-23 PROBLEM — G30.1 LATE ONSET ALZHEIMER'S DISEASE WITHOUT BEHAVIORAL DISTURBANCE (HCC): Status: ACTIVE | Noted: 2019-01-01

## 2019-05-23 PROBLEM — F02.80 LATE ONSET ALZHEIMER'S DISEASE WITHOUT BEHAVIORAL DISTURBANCE (HCC): Status: ACTIVE | Noted: 2019-01-01

## 2019-05-23 PROBLEM — R62.7 FAILURE TO THRIVE IN ADULT: Status: ACTIVE | Noted: 2019-01-01

## 2019-05-23 NOTE — PATIENT INSTRUCTIONS
Please call with any questions or concerns:   [facility]  @ [phone]. LAB RESULTS:  Please obtain any labs or diagnostic tests as discussed today. You should call the office to check the results. Please allow  3 to 7 days for us to get these results. MEDICATION LIST:  Please bring an accurate list of your medications to every visit. APPOINTMENT CONFIRMATION:  We will call you the day before your scheduled appointment to confirm. If we are unable to reach you, you MUST call back by the end of the day to confirm the appointment or we may be forced to cancel.
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
## 2019-05-24 NOTE — TELEPHONE ENCOUNTER
Received refill request for Pyridostigmine bromide from [facility].     Last OV: 5/9/19    Next Appt: 6/26/19    Last Refill: 5/9/19    Last Labs: 4/2/19

## 2019-05-24 NOTE — TELEPHONE ENCOUNTER
[facility] refilled this medication. Passing out less frequent but patient still passing out. They want to speak to [facility] regarding the medication that they discussed that she could also take to help these passing out spells.   Please call Winston Valentine daughter [phone]

## 2019-05-28 NOTE — TELEPHONE ENCOUNTER
She is a complex case. Do not recommend any medication changes over the phone. Keep OV follow up w/RMM in June. If symptoms have improved w/mestonin or his reassessment is consistent w/an aspect of autonomic dysfunction he may consider adjusting medications and treating it at f/up.

## 2019-05-30 NOTE — TELEPHONE ENCOUNTER
[facility] calling to alert that pt has complaints of pressure that starts in neck and then into shoulder and chest. Pt said she has sob as well. Pt said this happens when she stands or has any exertion. It goes away when she rests. This has been going on for the past 2 weeks. This is a constant issue. Please call to advise.

## 2019-05-30 NOTE — TELEPHONE ENCOUNTER
Called to speak with Ericka Porras regarding issues. Her passing out has improved she has not passed out in 5 days. She has a tightness across her chest that has lasted a couple weeks which happens with exertion. Resolves with rest. Her daughter feels like her symptoms are increasing.      10/27/17 Lexiscan   Symptoms    There was stress induced chest pain, SOB , arm pressure, lightheadedness    and headache.    Developed symptoms likely related to lexiscan.    Symptoms resolved with aminophylline.        Complications    Procedure complication was atrial fibrillation.        Stress Interpretation    Appropriate hemodynamic response to lexiscan with no significant ST    changes.         Can you call Correne Goldmann Daughter [phone]

## 2019-05-30 NOTE — TELEPHONE ENCOUNTER
Last OV 5/9/19  Last Echo 1/3/19       Called for Coco Santiago is out of the office 5/30, and 5/31. Spoke to the pt's daughter. The symptoms described are  slightly worse than before. She was concerned that she did not get an answer from her message dated 5/15/19. It was closed by Samir Elizondo CNP, without the EC being given an answer.

## 2019-05-31 NOTE — TELEPHONE ENCOUNTER
Needs to be seen by IC for further evaluation of chest pressure and tightness. Please schedule office visit with them. She has had partial seizure and treated by Dr. Janette Boyer.

## 2019-06-03 NOTE — TELEPHONE ENCOUNTER
Patient has seen Padmini Carpenter in the past , can she get worked in for an appt with him ? When and Where ?

## 2019-06-12 NOTE — PROGRESS NOTES
[address]     H+P // CONSULT // OUTPATIENT VISIT // Yudi Given VISIT     Referring Doctor Heaven Turner MD   Encounter Type Followup     CHIEF COMPLAINT     Visit Type Chronic   Symptoms CP   Problems CAD, HTN, AFIB/BYRON s/p PPM, CHOL      HISTORY OF PRESENT ILLNESS      GEN - Referral from Dr. Beth Foy for chest pressure patient experiences immediately before syncope or near syncope episodes. Nonexertional.  LHC 1 year ago normal.     CAD - Denies cp, sob, palpitations.  HTN - Ambulatory BP readings in good range. No HA or dizziness.  AFIB/BYRON - s/p PPM.  Still with episodes of syncope and near syncope. CP with episodes. Review of last year of ekgs and device interrogations reveals no afib.  CHOL - Last cholesterol reviewed and in good range.  MED - Compliant with CV meds listed below without notable side effects     COMPLIANCE     Category Meds Diet Salt Exercise Tobacco Alcohol Drugs   Compliant [x] [x] [x] [x] [x] [x] [x]   [x]Counseling given on all above above categories    HISTORY/ALLERGIES/ROS     MedHx:  has a past medical history of Anemia, Asthma, Blood circulation, collateral, Clostridium difficile diarrhea, Constipation, COPD (chronic obstructive pulmonary disease) ([address] DDD (degenerative disc disease), cervical, Essential hypertension, GERD (gastroesophageal reflux disease), History of traumatic head injury, Hypothyroidism, Major depressive disorder, Oropharyngeal dysphagia, Orthostatic hypotension, Osteopenia, Other disorders of kidney and ureter in diseases classified elsewhere, Pacemaker, Paroxysmal atrial fibrillation ([address] Partial epilepsy, with intractable epilepsy, pharmacoresistant ([address] Personal history of malignant neoplasm of cervix uteri, Personal history of PE (pulmonary embolism), Pneumonia, Post traumatic seizure ([address] Seizures ([address] Sinus node dysfunction ([address] and Spinal stenosis of lumbar region.   SurgHx:  has a past surgical history that includes refugio and bso (cervix removed) (9/2010); Tubal ligation; Rotator cuff repair (Right); Upper gastrointestinal endoscopy (02/2016); Colonoscopy (04/2013); pacemaker placement (Left); and pacemaker placement (Left). SocHx:   reports that she has never smoked. She has never used smokeless tobacco. She reports that she does not drink alcohol or use drugs. FamHx: family history includes Breast Cancer in her maternal aunt; Cancer in her daughter; High Blood Pressure in her mother; Lung Cancer in her brother; Other in her father; Parkinsonism in her mother; Stroke in her mother. Allergies: Aricept [donepezil hcl];  Cymbalta [duloxetine hcl]; and Sulfa antibiotics   ROS:  [x]Full ROS obtained and negative except as mentioned in HPI     MEDICATIONS      Current Outpatient Medications   Medication Sig Dispense Refill    fludrocortisone (FLORINEF) 0.1 MG tablet TAKE 1 TABLET BY MOUTH TWICE A DAY 60 tablet 3    pyridostigmine (MESTINON) 60 MG tablet Take 1 tablet by mouth 3 times daily 60 tablet 3    potassium chloride (KLOR-CON M) 10 MEQ extended release tablet Take 1 tablet by mouth daily 30 tablet 1    midodrine (PROAMATINE) 5 MG tablet Take 2 tablets by mouth 3 times daily 90 tablet 3    megestrol (MEGACE) 40 MG/ML suspension Take 10 mLs by mouth daily 360 mL 6    sertraline (ZOLOFT) 100 MG tablet Take 1.5 tablets by mouth daily 50 tablet 12    nystatin (MYCOSTATIN) 055415 UNIT/ML suspension TAKE 5 MLS BY MOUTH 4 TIMES DAILY (Patient taking differently: Take 500,000 Units by mouth 4 times daily as needed ) 140 mL 5    XARELTO 15 MG TABS tablet TAKE 1 TABLET BY MOUTH EVERY DAY WITH LARGE MEAL OF THE DAY 90 tablet 3    metoprolol tartrate (LOPRESSOR) 25 MG tablet Take 1 tablet by mouth 3 times daily as needed (systolic BP in right arm above 160) 60 tablet 3    ferrous sulfate 325 (65 Fe) MG tablet TAKE 1 TABLET BY MOUTH EVERY DAY WITH BREAKFAST 30 tablet 12    levalbuterol (XOPENEX) 0.63 MG/3ML and ideal body weight   Plan Continue current meds at doses above   ~Afib/bertrand   Date Detail   Type  parox   Hx  S/p pacer due to ILR with pauses up to 5s with sx   R/R     R/RM  Reviewed   CVS  >1   AC/AP  xarelto   Plan  No recent afib by ekg or device interrogation  Risk/benefit ratio of continued AC in patient with frequent falls ?   No evidence for afib in last year by ekg or device  Consider discontinuation of AC given marked fall risk  To discuss with Dr. Tonny Enamorado at next appt   ~Followup  Interval: PRN  Tests/Labs: None

## 2019-06-21 NOTE — PROGRESS NOTES
[facility]   Electrophysiology Follow up  Date: 6/24/2019    CC: s/p pacemaker implantation   HPI: Foster Castellanos is a 80 y.o. with history of recurrent syncope on 5/30/2016 and 7/21/16. She also has had recurrent episodes of dizzy spells and lightheadedness. ILR was implanted on 8/11/16 and showed multiple pauses of up to 5 seconds. On 9/29/16, she underwent dual chamber (MRI compatible) pacemaker for sinus node dysfunction. She has a history of prior seizures (since head injury in 2005)    She was hospitalized in October 2017 with C-diff, dizziness, and fatigue. While hospitalized, she had Atrial fib with RVR. She was started on Flecainide 50 mg twice a day. On Xarelto 15 mg daily for stroke prevention. Stress test was normal, coronary CTA showed no significant coronary stenosis. She was admitted to [facility] (1/13/18) with severe sharp chest pain followed by shaking sensation. Cardiac workup was negative including enzymes and EKG. She was seen by interventional cardiology but symptoms thought to be atypical for ACS. [address] [place] [address] 7/11/2018 showing normal coronaries with no intervention. She was seen again in ED on 8/5/2018 for aspiration of snickers bar which resulted in mild PNA. She was admitted 12/13/18 for a TIA. Admitted again to [facility] 1/3/19 this time for hypertensive crisis. Interim history:   She has fallen several times with the most recent being this past Friday night. Her chest pains have seemed to have improved. He has history of seizure and follows up with neurology. She also has had injury secondary to recurrent falls. Has seen interventional cardiology for occasional chest pain.     Past Medical History:   Diagnosis Date    Anemia     Asthma     Blood circulation, collateral     Clostridium difficile diarrhea 10/21/2017    Constipation     COPD (chronic obstructive pulmonary disease) (HCC)     DDD (degenerative disc disease), cervical     Essential hypertension     GERD (gastroesophageal reflux disease)     History of traumatic head injury 05/2005    Hypothyroidism     Major depressive disorder     Oropharyngeal dysphagia     Orthostatic hypotension     Osteopenia     Other disorders of kidney and ureter in diseases classified elsewhere     Pacemaker     Paroxysmal atrial fibrillation (HCC)     Partial epilepsy, with intractable epilepsy, pharmacoresistant ([address]     Personal history of malignant neoplasm of cervix uteri 6/6/2013    Personal history of PE (pulmonary embolism)     post surgical    Pneumonia     Post traumatic seizure ([address]     Seizures ([address]     Sinus node dysfunction ([address]     Spinal stenosis of lumbar region         Past Surgical History:   Procedure Laterality Date    COLONOSCOPY  04/2013    normal except diverticulosis    PACEMAKER PLACEMENT Left     MRI compatible    PACEMAKER PLACEMENT Left     ROTATOR CUFF REPAIR Right     ROLAND AND BSO  9/2010    cervical cancer, Dr. Kera Jeffries ENDOSCOPY  02/2016    normal       Allergies   Allergen Reactions    Aricept [Donepezil Hcl]      SE : hypotension     Cymbalta [Duloxetine Hcl] Other (See Comments)     Sleepy dizzy    Sulfa Antibiotics      Doesn't remember       Medication:   Current Outpatient Medications   Medication Sig Dispense Refill    pyridostigmine (MESTINON) 60 MG tablet Take 1 tablet by mouth 3 times daily 90 tablet 11    KLOR-CON M10 10 MEQ extended release tablet TAKE 1 TABLET BY MOUTH EVERY DAY 30 tablet 12    fludrocortisone (FLORINEF) 0.1 MG tablet TAKE 1 TABLET BY MOUTH TWICE A DAY 60 tablet 3    midodrine (PROAMATINE) 5 MG tablet Take 2 tablets by mouth 3 times daily 90 tablet 3    megestrol (MEGACE) 40 MG/ML suspension Take 10 mLs by mouth daily 360 mL 6    sertraline (ZOLOFT) 100 MG tablet Take 1.5 tablets by mouth daily 50 tablet 12    nystatin (MYCOSTATIN) 319919 UNIT/ML suspension TAKE 5 MLS BY MOUTH 4 TIMES DAILY (Patient taking differently: Take 500,000 Units by mouth 4 times daily as needed ) 140 mL 5    metoprolol tartrate (LOPRESSOR) 25 MG tablet Take 1 tablet by mouth 3 times daily as needed (systolic BP in right arm above 160) (Patient taking differently: Take 25 mg by mouth See Admin Instructions Take 1/2 tab PRN if SBP >160.) 60 tablet 3    ferrous sulfate 325 (65 Fe) MG tablet TAKE 1 TABLET BY MOUTH EVERY DAY WITH BREAKFAST 30 tablet 12    levalbuterol (XOPENEX) 0.63 MG/3ML nebulization Take 3 mLs by nebulization every 4 hours as needed for Wheezing 360 mL 5    flecainide (TAMBOCOR) 50 MG tablet Take 1 tablet by mouth every 12 hours 60 tablet 12    tiotropium (SPIRIVA RESPIMAT) 2.5 MCG/ACT AERS inhaler Inhale 2 puffs into the lungs daily 1 Inhaler 12    nitroGLYCERIN (NITROSTAT) 0.4 MG SL tablet up to max of 3 total doses. If no relief after 1 dose, call 911. 25 tablet 3    levothyroxine (SYNTHROID) 50 MCG tablet TAKE 1 TABLET BY MOUTH DAILY 90 tablet 3    Cranberry 1000 MG CAPS Take 2 capsules by mouth daily       levETIRAcetam (KEPPRA) 750 MG tablet Take 1 tablet by mouth 2 times daily 360 tablet 3    vitamin B-12 (CYANOCOBALAMIN) 1000 MCG tablet Take 1 tablet by mouth daily 30 tablet 3    multivitamin (THERAGRAN) per tablet Take 1 tablet by mouth daily. No current facility-administered medications for this visit. Social History:   reports that she has never smoked. She has never used smokeless tobacco. She reports that she does not drink alcohol or use drugs. Family History:  family history includes Breast Cancer in her maternal aunt; Cancer in her daughter; High Blood Pressure in her mother; Lung Cancer in her brother; Other in her father; Parkinsonism in her mother; Stroke in her mother.         Review of System:    · General: negative for fever, chills   · Ophthalmic ROS: negative for - eye pain or loss of vision  · ENT ROS: negative for - headaches, sore throat · Respiratory: negative for - cough, sputum  · Cardiovascular: Reviewed in HPI  Atypical chest pain  · Gastrointestinal: negative for - abdominal pain, diarrhea, N/V  · Hematology: negative for - bleeding, blood clots, bruising or jaundice  · Genito-Urinary:  negative for - Dysuria or incontinence  · Musculoskeletal: negative for - Joint swelling, muscle pain  · Neurological: negative for - confusion,  headaches   · Psychiatric: No anxiety, no depression. · Dermatological: negative for - rash    Physical Examination:  Vitals:    06/24/19 1311   BP: 138/77   Pulse: 70   Resp: 16     · Constitutional: Oriented. No distress. · Head: Normocephalic and atraumatic. · Mouth/Throat: Oropharynx is clear and moist.   · Eyes: Conjunctivae normal. EOM are normal.   · Neck: Neck supple. No JVD present. · Cardiovascular: Normal rate, regular rhythm, S1&S2. · Pulmonary/Chest: Bilateral respiratory sounds. No rhonchi. · Abdominal: Soft. No tenderness. · Musculoskeletal: No tenderness. No edema    · Lymphadenopathy: Has no cervical adenopathy. · Neurological: Alert and oriented. Follows command, No Gross deficit   · Skin: Skin is warm, No rash noted. · Psychiatric: Has a normal behavior     Labs:  Lab Results   Component Value Date    TSHREFLEX 3.61 01/03/2019    TSH 2.58 12/19/2017    TSH 2.56 10/24/2017    CREATININE <0.5 04/02/2019    CREATININE 0.5 04/01/2019    AST 26 03/30/2019    ALT 18 03/30/2019   Reviewed. ECG 6/24/19  Sinus Rhythm  QTc 444      ECHO: 8/24/17   Summary   -Global ejection fraction is increased (hyperdynamic) and estimated from 70   % to 75 %.  -No regional wall motion abnormalities are noted.   -Thickened mitral valve without evidence of stenosis. Trace mitral   regurgitation.   -There is trivial tricuspid regurgitation with RVSP estimated at 33 mmHg.   -Pacemaker wire noted in right heart.   -E/e'= 14.3 .     Echo: 2/2/16  Summary   -Normal left ventricle size, wall thickness and systolic function with an   estimated ejection fraction of 55%.   -Trivial mitral regurgitation is present.   -There is mild tricuspid regurgitation with RVSP estimated at 33 mmHg.     10/25/17 Coronary CTA  No significant coronary artery stenosis is identified.       Normal variation in the supply the anterolateral wall.  Although only 1 acute   diagonal is identified, there are 2 proximal obtuse marginal vessels.       Right coronary dominance.         STRESS TEST:   10/24/17  Summary    Normal myocardial perfusion study.    Normal LV size and systolic function.      1/19/05 CTPA  1. No findings of pulmonary embolism. 2. Patchy left upper lobe bronchial secretions with minimal bilateral central   bronchial wall thickening, suggesting bronchitis.        Carotid ultrasound: 5/2016  Carotid ultrasound showed 1% to 36% stenosis LICA       [facility]: 2/60/78  Findings:                      LM       Normal              LAD     Normal              Cx        Normal              RCA     Normal              LVG     60%              EDP     10 mmHg  Post Cath Dx:Normal cors  Intervention:  None  Rec:                Continued risk factor modification      Assessment:   Patient Active Problem List    Diagnosis Date Noted    Unstable angina ([facility] 07/11/2018     Priority: High    Essential hypertension 01/18/2016     Priority: High    Orthostatic hypotension 03/02/2015     Priority: High    Partial epilepsy with intractable epilepsy ([facility] 08/19/2014     Priority: High    COPD (chronic obstructive pulmonary disease) ([facility] 06/06/2013     Priority: High    Post traumatic seizure ([facility] 06/06/2013     Priority: High    Anemia 05/31/2016     Priority: Medium    Hypothyroidism 03/19/2015     Priority: Medium    Constipation 03/02/2015     Priority: Low    Spinal stenosis of lumbar region 07/07/2014     Priority: Low    DDD (degenerative disc disease), lumbar 06/06/2013     Priority: Low    Osteopenia 06/06/2013     Priority: Low  Late onset Alzheimer's disease without behavioral disturbance 05/23/2019    Failure to thrive in adult 05/23/2019    Syncope and collapse 03/30/2019    Nausea and vomiting 03/30/2019    Abnormal EKG 03/30/2019    Hypokalemia     Dysarthria 01/03/2019    Hypertensive emergency     Hypertensive urgency 12/13/2018    Aspiration into airway 10/11/2018    Chest pain 07/01/2018    Seizure-like activity (HCC)     Shaking     Partial seizure (HCC)     Mild episode of recurrent major depressive disorder ([facility] 11/29/2017    SOB (shortness of breath)     COPD, mild (HCC)     DDD (degenerative disc disease), cervical 08/07/2017    Paroxysmal atrial fibrillation ([facility] 01/23/2017    Pacemaker 10/03/2016    Bradycardia     Sinus node dysfunction ([facility] 09/26/2016    Oropharyngeal dysphagia 03/02/2016        Plan:    Recurrent falls   Patient has high CAU0KT2-NYTa score and requires anticoagulation to prevent thromboembolic events. Unfortunately patient has had recurrent faills and is at high risk of bleeding and not a good candidate for long term anticoagulation therapy. I have discussed this in detail with patient and family members. I discussed left atrial colsure with watchman device with her and her daughter present. . Alternatives including surgical ligation of IMELDA also discussed. Risks, benefits and alternative of procedure were explained. All questions answered. Patient and daughter state that they have read information about watchman device however they are not interested in proceeding with any surgical procedure at this time. Patient pacemaker interrogated. Showed A. fib burden less than 0.01%     Stop Xarelto d/t high bleeding risk and frequent falls. - Symptomatic bradycardia    Sinus node dysfunction:    S/p pacemaker implantation 9/29/16. I reviewed device interrogation today. Device functions normally.     Apaced 5%    Vpaced minimal    Last A. fib was on June 13, 2019 lasted 3 minutes. Follow up with device clinic as scheduled. - Seizure: Keppra. Always up with neurology. F/u NP in 6 months  Follow-up with device clinic. See EP as needed. Thank you for allowing me to participate in the care of Irena Calvillo. Further evaluation will be based upon the patient's clinical course and testing results. All questions and concerns were addressed to the patient/family. Alternatives to my treatment were discussed. I have discussed the above stated plan and the patient verbalized understanding and agreed with the plan. NOTE: This report was transcribed using voice recognition software. Every effort was made to ensure accuracy, however, inadvertent computerized transcription errors may be present. Ghislaine Chun MD, MPH  [place] [address]   Office: [phone]     Scribe attestation: This note was scribed in the presence of Ghislaine Chun MD by Fer James RN  Physician Attestation: I, Dr. Ghislaine Chun, confirm that the scribe's documentation has been prepared under my direction and personally reviewed by me in its entirety. I also confirm that the note above accurately reflects all work, treatment, procedures, and medical decision making performed by me.

## 2019-06-25 NOTE — TELEPHONE ENCOUNTER
Patients daughter, Corrinne Boston, called, states Friday evening pt passed out, daughter said this happens often and you are aware of it. She fell and hit her head and back then landed on right hip. Did not go to E/R. She started c/o right hip pain on Sunday. Daughter is wondering if you will order an xray?   I tried to get her to make an appt, but she wanted note sent back first.

## 2019-07-08 NOTE — ED PROVIDER NOTES
tenderness. Abdominal: Soft. She exhibits no distension. There is no tenderness. Musculoskeletal: Normal range of motion. She exhibits no edema, tenderness or deformity. Neurological: She is alert and oriented to person, place, and time. No cranial nerve deficit. She exhibits normal muscle tone. Coordination normal.   No focal deficit appreciated. Skin: Skin is warm and dry. No rash noted. She is not diaphoretic. No erythema. No pallor. Psychiatric: She has a normal mood and affect. Her behavior is normal. Judgment and thought content normal.       DIAGNOSTIC RESULTS     EKG (Per Emergency Physician):   EKG interpretation by ED physician: Normal axis, sinus rhythm at 65 bpm.  ST slurring anteriorly, laterally, inferiorly. Similar to previous EKG from March 30, 2019. RADIOLOGY (Per Emergency Physician): Interpretation per the Radiologist below, if available at the time of this note:  Xr Chest Portable    Result Date: 7/8/2019  EXAMINATION: ONE XRAY VIEW OF THE CHEST 7/8/2019 3:36 pm COMPARISON: 03/30/2019 HISTORY: ORDERING SYSTEM PROVIDED HISTORY: cp TECHNOLOGIST PROVIDED HISTORY: Reason for exam:->cp Reason for Exam: Pt states chest pain. Pt states weakness and blurred vision. Pt states she has been passing out after standing up. Acuity: Unknown Type of Exam: Initial FINDINGS: Single portable frontal view of the chest is submitted for review. The cardiac silhouette is normal in size. Lung parenchyma is clear without focal airspace consolidation, sizeable pleural effusion, or pneumothorax. Implantable cardiac device leads overlying the right atrium and ventricle. Generalized interstitial prominence, suggestive of underlying COPD. Trachea is midline. Visualized osseous structures and soft tissues are grossly intact. No acute cardiopulmonary pathology. COPD.        LABS:  Labs Reviewed   URINE RT REFLEX TO CULTURE - Abnormal; Notable for the following components:       Result Value    Bilirubin Urine SMALL (*)     Ketones, Urine TRACE (*)     Protein, UA 30 (*)     All other components within normal limits    Narrative:     Performed at:  [facility]  [address]  [place], [address]   Phone [phone]   CBC WITH AUTO DIFFERENTIAL - Abnormal; Notable for the following components:    RBC 3.98 (*)     All other components within normal limits    Narrative:     Performed at:  [facility] [address]  [place], [address]   Phone [phone]   COMPREHENSIVE METABOLIC PANEL W/ REFLEX TO MG FOR LOW K - Abnormal; Notable for the following components:    Potassium reflex Magnesium 2.1 (*)     Chloride 96 (*)     Glucose 122 (*)     All other components within normal limits    Narrative:     Lili Singh  [facility] tel. [phone],  Chemistry results called to and read back by nacho noland, 07/08/2019  15:54, by DECDA  Performed at:  [facility] [address]  [place], [address]   Phone [phone]   MICROSCOPIC URINALYSIS - Abnormal; Notable for the following components:    RBC, UA 8 (*)     All other components within normal limits    Narrative:     Performed at:  [facility] [address]  [place], [address]   Phone [phone]   C DIFF TOXIN/ANTIGEN   TROPONIN    Narrative:     Performed at:  [facility] [address]  [place], [address]   Phone [phone]    Narrative:     Performed at:  [facility] [address],  [place], [employer]   Phone [phone]   MAGNESIUM    Narrative:     Lili Singh  [facility] tel. [phone],  Chemistry results called to and read back by nacho noland, 07/08/2019  15:54, by DECDA  Performed at:  [facility] [address]  [place], [address]   Phone [phone]   Jesus Ríos REFLEX TO MG FOR LOW K       All other labs were within normal range or not returned as of this dictation.     EMERGENCY DEPARTMENT COURSE and DIFFERENTIAL DIAGNOSIS/MDM:   Vitals:    Vitals:    07/08/19 1745 07/08/19 1800 07/08/19 1814 07/08/19 1905   BP: (!) 198/89 (!) 189/90 (!) 213/109 (!) 179/84   Pulse: 69 71 77 70   Resp: 15 12 16    Temp:  98.4 °F (36.9 °C) 97.6 °F (36.4 °C)    TempSrc:  Oral Oral    SpO2: 97% 98% 99%    Weight:   107 lb 5 oz (48.7 kg)    Height:   5' 1\" (1.549 m)        Medications   multivitamin 1 tablet (has no administration in time range)   vitamin B-12 (CYANOCOBALAMIN) tablet 1,000 mcg (has no administration in time range)   flecainide (TAMBOCOR) tablet 50 mg (has no administration in time range)   tiotropium (SPIRIVA RESPIMAT) 2.5 MCG/ACT inhaler 2 puff (has no administration in time range)   albuterol (PROVENTIL) nebulizer solution 1.25 mg (has no administration in time range)   midodrine (PROAMATINE) tablet 10 mg (10 mg Oral Not Given 7/8/19 1840)   megestrol (MEGACE) 40 MG/ML suspension 400 mg (has no administration in time range)   sertraline (ZOLOFT) tablet 100 mg (has no administration in time range)   fludrocortisone (FLORINEF) tablet 100 mcg (has no administration in time range)   pyridostigmine (MESTINON) tablet 60 mg (has no administration in time range)   levothyroxine (SYNTHROID) tablet 50 mcg (has no administration in time range)   ferrous sulfate tablet 325 mg (has no administration in time range)   sodium chloride flush 0.9 % injection 10 mL (has no administration in time range)   sodium chloride flush 0.9 % injection 10 mL (10 mLs Intravenous Given 7/8/19 1852)   magnesium hydroxide (MILK OF MAGNESIA) 400 MG/5ML suspension 30 mL (has no administration in time range)   ondansetron (ZOFRAN) injection 4 mg (has no administration in time range)   enoxaparin (LOVENOX) injection 40 mg (has no administration in time range)   0.9 % sodium chloride infusion ( Intravenous New Bag

## 2019-07-08 NOTE — H&P
2 puffs into the lungs daily 9/7/18  Yes Chaparro Caldera MD   nitroGLYCERIN (NITROSTAT) 0.4 MG SL tablet up to max of 3 total doses. If no relief after 1 dose, call 911. 7/2/18  Yes Victorino Hines MD   Cranberry 1000 MG CAPS Take 2 capsules by mouth daily    Yes Historical Provider, MD   levETIRAcetam (KEPPRA) 750 MG tablet Take 1 tablet by mouth 2 times daily 7/25/17  Yes Chaparro Caldera MD   vitamin B-12 (CYANOCOBALAMIN) 1000 MCG tablet Take 1 tablet by mouth daily 6/1/16  Yes Abena Michaels MD   multivitamin [facility]) per tablet Take 1 tablet by mouth daily. Yes Historical Provider, MD       Allergies:  Aricept [donepezil hcl]; Cymbalta [duloxetine hcl]; and Sulfa antibiotics    Social History:      TOBACCO:   reports that she has never smoked. She has never used smokeless tobacco.  ETOH:   reports that she does not drink alcohol. Family History:          Problem Relation Age of Onset    Stroke Mother     Parkinsonism Mother     High Blood Pressure Mother     Cancer Daughter         breast    Other Father         black lung    Lung Cancer Brother     Breast Cancer Maternal Aunt     Heart Disease Neg Hx     High Cholesterol Neg Hx        REVIEW OF SYSTEMS:   Pertinent positives as noted in the HPI. All other systems reviewed and negative. PHYSICAL EXAM:    /64   Pulse 78   Temp 98 °F (36.7 °C) (Oral)   Resp 18   SpO2 98%     Gen/overall appearance: Not in acute distress. Alert. Head: Normocephalic, atraumatic  Eyes: EOMI, no scleral icterus  CVS: regular rate and rhythm, Normal S1S2  Pulm: Clear to auscultation bilaterally. No crackles/wheezes  Gastrointestinal: Soft, nontender, nondistended, no guarding or rebound  Extremities: No edema.  No erythema or warmth  Neuro: No gross focal deficits noted  Skin: Warm, dry    Labs:     Recent Labs     07/08/19  1526   WBC 4.6   HGB 13.1   HCT 37.2        Recent Labs     07/08/19  1526      K 2.1*   CL 96*   CO2 30

## 2019-07-09 NOTE — DISCHARGE INSTR - COC
Continuity of Care Form    Patient Name: Irma Stout   :  1931  MRN:  [id number]    Admit date:  2019  Discharge date:  2019    Code Status Order: Full Code   Advance Directives:   [facility] Documentation     Date/Time Healthcare Directive Type of Healthcare Directive Copy in [address] Agent's Name Healthcare Agent's Phone Number    [phone]  Yes, patient has an advance directive for healthcare treatment  Durable power of  for health care; Health care treatment directive; Living will  No, copy requested from family  Healthcare power of   Álvaro Drafts  [phone]          Admitting Physician:  Naa Zhao MD  PCP: Dylon Day MD    Discharging Nurse: GEORGES Booth Unit/Room#: 0UR-8338/1329-48  Discharging Unit Phone Number: [phone]    Emergency Contact:   Extended Emergency Contact Information  Primary Emergency Contact: [address] Phone: [phone]  Relation: Child    Past Surgical History:  Past Surgical History:   Procedure Laterality Date    COLONOSCOPY  2013    normal except diverticulosis    PACEMAKER PLACEMENT Left     MRI compatible    PACEMAKER PLACEMENT Left     ROTATOR CUFF REPAIR Right     ROLAND AND BSO  2010    cervical cancer, Dr. Dustin Duke ENDOSCOPY  2016    normal       Immunization History:   Immunization History   Administered Date(s) Administered    Influenza, High Dose (Fluzone 65 yrs and older) 2015, 2016, 2017, 2018    Pneumococcal Conjugate 13-valent ([id number]) 2015    Pneumococcal Polysaccharide ([id number]) 2007    Tdap (Boostrix, Adacel) 2016       Active Problems:  Patient Active Problem List   Diagnosis Code    COPD (chronic obstructive pulmonary disease) ([facility] [address] J44.9    DDD (degenerative disc disease), lumbar M51.36    Osteopenia M85.80    Post traumatic seizure ([facility] [address] R56.1    Spinal stenosis of lumbar region M48.061    Partial epilepsy with intractable epilepsy ([facility] [address] G40.119    Orthostatic hypotension I95.1    Constipation K59.00    Hypothyroidism E03.9    Essential hypertension I10    Oropharyngeal dysphagia R13.12    Anemia D64.9    Sinus node dysfunction (HCC) I49.5    Bradycardia R00.1    Pacemaker Z95.0    Paroxysmal atrial fibrillation (HCC) I48.0    DDD (degenerative disc disease), cervical M50.30    SOB (shortness of breath) R06.02    COPD, mild (HCC) J44.9    Mild episode of recurrent major depressive disorder (HCC) F33.0    Shaking R25.1    Partial seizure (HCC) R56.9    Seizure-like activity (HCC) R56.9    Chest pain R07.9    Unstable angina (HCC) I20.0    Aspiration into airway T17.908A    Hypertensive urgency I16.0    Hypertensive emergency I16.1    Dysarthria R47.1    Hypokalemia E87.6    Syncope and collapse R55    Nausea and vomiting R11.2    Abnormal EKG R94.31    Late onset Alzheimer's disease without behavioral disturbance G30.1, F02.80    Failure to thrive in adult R62.7       Isolation/Infection:   Isolation          C Diff Contact            Nurse Assessment:  Last Vital Signs: BP (!) 167/83   Pulse 73   Temp 97.7 °F (36.5 °C) (Oral)   Resp 16   Ht 5' 1\" (1.549 m)   Wt 111 lb 6.4 oz (50.5 kg)   SpO2 98%   BMI 21.05 kg/m²     Last documented pain score (0-10 scale): Pain Level: 6  Last Weight:   Wt Readings from Last 1 Encounters:   07/09/19 111 lb 6.4 oz (50.5 kg)     Mental Status:  oriented and alert    IV Access:  - None    Nursing Mobility/ADLs:  Walking   Assisted Uses walker  Transfer  Assisted  Bathing  Assisted  Dressing  Assisted  Toileting  Independent  Feeding  Independent  Med Admin  Assisted  Med Delivery   prefers mixed with puding    Wound Care Documentation and Therapy:  Incision 09/29/16 Chest Left (Active)   Number of days: 1012        Elimination:  Continence:

## 2019-07-09 NOTE — PROGRESS NOTES
someone with her when ambulating)  Transfer Assistance: Needs assistance(Needs assistance sitting up EOB )  Active : No  Mode of Transportation: SUV  Additional Comments: 4 reported falls from daughter since March 9, from same passing out symptoms       Objective   Vision: Impaired  Vision Exceptions: Wears glasses at all times  Hearing: Exceptions to [facility]  Hearing Exceptions: Hard of hearing/hearing concerns       Observation/Palpation  Posture: Fair  Balance  Sitting Balance: Stand by assistance  Standing Balance: Contact guard assistance  Standing Balance  Time: ~5-7 min   Activity: ambulation, transfers  Functional Mobility  Functional - Mobility Device: Rolling Walker  Activity: Other(ambulation in hallway)  Assist Level: Contact guard assistance  Functional Mobility Comments: pt cga w/ ambulation for safety  ADL  LE Dressing: Maximum assistance(pt daughter threaded pant legs, pt was able to stand and pull pants up w/ CGA)  Additional Comments: pt daughter reports that she has been providing max a w/ adl skills for the last few months, however she is interested in education and assistance from OT to decrease the burden of care when retuning home. Pt owns AE, however has difficulty using it to perform ADL skills. Tone RUE  RUE Tone: Normotonic  Tone LUE  LUE Tone: Normotonic  Coordination  Movements Are Fluid And Coordinated: Yes     Bed mobility  Supine to Sit: (w/ head of bed elevated)  Scooting: Contact guard assistance  Transfers  Sit to stand: Minimal assistance  Stand to sit: Minimal assistance(verbal cues for safety and hand placement)     Cognition  Overall Cognitive Status: Exceptions  Arousal/Alertness: Appropriate responses to stimuli  Following Commands:  Follows one step commands with increased time  Attention Span: Appears intact  Memory: Decreased recall of recent events;Decreased recall of precautions  Safety Judgement: Decreased awareness of need for safety;Decreased awareness of need for assistance  Problem Solving: Assistance required to identify errors made;Decreased awareness of errors;Assistance required to generate solutions  Insights: Decreased awareness of deficits  Initiation: Requires cues for some  Sequencing: Requires cues for some        Sensation  Overall Sensation Status: WFL        LUE AROM (degrees)  LUE AROM : WNL  Left Hand AROM (degrees)  Left Hand AROM: WNL  RUE AROM (degrees)  RUE AROM : WNL  Right Hand AROM (degrees)  Right Hand AROM: WNL  LUE Strength  Gross LUE Strength: Exceptions to [facility]  RUE Strength  Gross RUE Strength: Exceptions to [facility]  RUE Strength Comment: B elbow ext 4-/5 - 4/5 throughout B UE                   Plan   Plan  Times per week: 1-2  Times per day: Daily  Specific instructions for Next Treatment: pt will benefit from education w/ AE and compensatory tech needed to improve functional indep and decrease care provided by daughter. Current Treatment Recommendations: Endurance Training, Equipment Evaluation, Education, & procurement, Safety Education & Training, Functional Mobility Training, Self-Care / ADL    G-Code     OutComes Score                                                  -PAC Score        -[facility] Inpatient Daily Activity Raw Score: 17 (07/09/19 1308)  AM-PAC Inpatient ADL T-Scale Score : 37.26 (07/09/19 1308)  ADL Inpatient CMS 0-100% Score: 50.11 (07/09/19 1308)  ADL Inpatient CMS G-Code Modifier : CK (07/09/19 1308)    Goals  Short term goals  Time Frame for Short term goals: d/c  Short term goal 1: LB dressing w/ use of AE CGA  Short term goal 2: UB dressing SBA  Short term goal 3: toileting SBA  Long term goals  Time Frame for Long term goals : LTG=STG  Patient Goals   Patient goals : decrease burden of care at home.         Therapy Time   Individual Concurrent Group Co-treatment   Time In 1112         Time Out 1150         Minutes 38              Timed Code Treatment Minutes:   23 min      Total Treatment Minutes:  45 min    Susan Medina OT

## 2019-07-09 NOTE — PROGRESS NOTES
Speech Language Pathology  CLINICAL BEDSIDE SWALLOWING EVALUATION  Speech Therapy Department    Patient Name:  Zion Rowley  :  1931  Pain level: Pt denies pain at this time  Medical Diagnosis:   Syncope and collapse [R55]  Syncope and collapse [R55]     HPI: Zion Rowley is a 80 y.o. female who presents to the emergency department with chief complaint of syncope x4 today. She has a history of multiple episodes of syncope. She denies any vision changes, facial droop, slurred speech, numbness or weakness of the extremities. No headache or vomiting. No diarrhea. No recent blood pressure medication changes. EMS reported that they checked orthostatics which were positive. She has a history of this over the past year and is very frequent. Note that although she passed out today her daughter caught her and she did not hit her head. Speech Therapy/Clinical Swallow Evaluation order received. This Pt is known to us with a history of dysphagia. Most recent MBS completed on 3/26/18 with the following results:  · Modified Barium Swallow evaluation completed on 3/26/2018. Results indicate mild/moderate oropharyngeal dysphagia with laryngeal penetration of thin, nectar thick liquids and one episode of penetration with puree. Laryngeal penetration was followed by immediate throat clearing that appeared to eject material from the airway. No aspiration was viewed during this study. Laryngeal penetration may be contributing factor to frequent throat clearing. Anterior curvature of the cervical spine was assessed. Oral phase was characterized by decreased A-P propulsion, premature bolus loss and delayed palatal retraction. Pharyngeal phase was characterized by slightly delayed swallow onset, decreased airway protection, mildly delayed pharyngeal clearing and trace pharyngeal stasis.  Decreased airway protection appeared to be due to slight delay in swallow initiation, decreased anterior hyoid movement and

## 2019-07-09 NOTE — PROGRESS NOTES
When moves head back and forth, feels like she hears bells. Left side louder than right side.  Electronically signed by Margaret Ng RN on 7/9/2019 at 9:33 AM

## 2019-07-10 NOTE — CONSULTS
medical history of Anemia, Asthma, Blood circulation, collateral, Clostridium difficile diarrhea, Constipation, COPD (chronic obstructive pulmonary disease) ([address] DDD (degenerative disc disease), cervical, Essential hypertension, GERD (gastroesophageal reflux disease), History of traumatic head injury, Hypothyroidism, Major depressive disorder, Oropharyngeal dysphagia, Orthostatic hypotension, Osteopenia, Other disorders of kidney and ureter in diseases classified elsewhere, Pacemaker, Paroxysmal atrial fibrillation ([address] Partial epilepsy, with intractable epilepsy, pharmacoresistant ([address] Personal history of malignant neoplasm of cervix uteri, Personal history of PE (pulmonary embolism), Pneumonia, Post traumatic seizure ([address] Seizures ([address] Sinus node dysfunction ([address] and Spinal stenosis of lumbar region. Past Surgical History:   has a past surgical history that includes refugio and bso (cervix removed) (9/2010); Tubal ligation; Rotator cuff repair (Right); Upper gastrointestinal endoscopy (02/2016); Colonoscopy (04/2013); pacemaker placement (Left); and pacemaker placement (Left). Advance Directives:      Code status is full, daughter Deepak Moore is DPOA    Problem Severity: Pain/Other Symptoms:      Patient has a history of degenerative disc disease. States she has mild right ankle pain. Patient has Tylenol available for pain. Bed Mobility/Toileting/Transfer:      Patient's daughter Deepak Moore assists with ADLs    Performance Status:      Patient uses a walker    Symptom Assessment: Appetite/Nausea/Bowels/Fatigue:      Patient is on a dysphagia III diet with thin liquids. Patient is on Megace. Social History:   reports that she has never smoked. She has never used smokeless tobacco. She reports that she does not drink alcohol or use drugs. Family History:  family history includes Breast Cancer in her maternal aunt;  Cancer in her daughter; High Blood Pressure in her mother; Lung Cancer in her brother;

## 2019-07-10 NOTE — CONSULTS
PACEMAKER PLACEMENT Left     MRI compatible    PACEMAKER PLACEMENT Left     ROTATOR CUFF REPAIR Right     ROLAND AND BSO  9/2010    cervical cancer, Dr. Kaur Deep ENDOSCOPY  02/2016    normal      Past Endoscopic History:   Colonoscopy with Dr Goran Short 1/2018 for diarrhea with diverticulosis and small hemorrhoids. Normal EGD with Dr Goran Short 2016 for dysphagia. Admission Meds  No current facility-administered medications on file prior to encounter.       Current Outpatient Medications on File Prior to Encounter   Medication Sig Dispense Refill    levETIRAcetam (KEPPRA) 750 MG tablet Take 750 mg by mouth 2 times daily       midodrine (PROAMATINE) 5 MG tablet Take 5 mg by mouth daily as needed Take 1 tablet as needed for SBP <100      donepezil (ARICEPT) 10 MG tablet Take 10 mg by mouth nightly as needed       sertraline (ZOLOFT) 100 MG tablet Take 150 mg by mouth daily       metoprolol tartrate (LOPRESSOR) 25 MG tablet Take 12.5 mg by mouth 3 times daily as needed (SBP >160)       aspirin 81 MG tablet Take 81 mg by mouth daily      acetaminophen (TYLENOL) 325 MG tablet Take 650 mg by mouth 2 times daily      Cranberry 1000 MG CAPS Take 2,000 mg by mouth      lactobacillus (CULTURELLE) capsule Take 1 capsule by mouth daily      levothyroxine (SYNTHROID) 50 MCG tablet TAKE 1 TABLET BY MOUTH DAILY 90 tablet 3    pyridostigmine (MESTINON) 60 MG tablet Take 1 tablet by mouth 3 times daily 90 tablet 11    KLOR-CON M10 10 MEQ extended release tablet TAKE 1 TABLET BY MOUTH EVERY DAY 30 tablet 12    fludrocortisone (FLORINEF) 0.1 MG tablet TAKE 1 TABLET BY MOUTH TWICE A DAY 60 tablet 3    megestrol (MEGACE) 40 MG/ML suspension Take 10 mLs by mouth daily 360 mL 6    ferrous sulfate 325 (65 Fe) MG tablet TAKE 1 TABLET BY MOUTH EVERY DAY WITH BREAKFAST (Patient taking differently: TAKE 1 TABLET BY MOUTH EVERY DAY AT LUNCH) 30 tablet 12    levalbuterol (XOPENEX) 0.63 dizziness and weakness         Physical Exam  Blood pressure (!) 164/73, pulse 73, temperature 97.5 °F (36.4 °C), temperature source Oral, resp. rate 16, height 5' 1\" (1.549 m), weight 115 lb 6.4 oz (52.3 kg), SpO2 97 %, not currently breastfeeding. General appearance: alert, cooperative, no distress, appears stated age  Eyes: Anicteric  Head: Normocephalic, without obvious abnormality  Lungs: clear to auscultation bilaterally, Normal Effort  Heart: regular rate and rhythm, normal S1 and S2, no murmurs or rubs  Abdomen: soft, non-tender. Bowel sounds normal. No masses,  no organomegaly. Extremities: atraumatic, no cyanosis or edema  Skin: warm and dry, no jaundice  Neuro: Grossly intact, A&OX3  Musculoskeletal: 5/5  strength BUE      Data Review:    Recent Labs     07/08/19  1526   WBC 4.6   HGB 13.1   HCT 37.2   MCV 93.3        Recent Labs     07/08/19  1526 07/09/19  0500 07/09/19  1737 07/10/19  0343 07/10/19  1228    142  --   --   --    K 2.1* 2.4* 2.5* 2.8* 2.9*   CL 96* 101  --   --   --    CO2 30 26  --   --   --    BUN 16 12  --   --   --    CREATININE 0.8 0.6  --   --   --      Recent Labs     07/08/19  1526   AST 24   ALT 15   BILITOT 0.4   ALKPHOS 51     No results for input(s): LIPASE, AMYLASE in the last 72 hours. Recent Labs     07/08/19  1526   PROTIME 12.2   INR 1.07     No results for input(s): PTT in the last 72 hours. No results for input(s): OCCULTBLD in the last 72 hours. Imaging Studies:                 esophagram 7/10/19  FINDINGS:   Esophagus is normal in caliber.  Tertiary contractions are noted   intermittently.  No mucosal abnormality, stricture or filling defect is   noted.  GE junction appears unremarkable in appearance.  No reflux is   identified. Assessment:     Active Problems:    Syncope and collapse  Resolved Problems:    * No resolved hospital problems. *    Dysphagia - Chronic. Reports coughing with swallowing.   Prior EGD in 2016 for dysphagia was negative. she has mild OP dysphagia per SLP eval and recs were for Dysphagia III diet with thin liquids. No strictures on esophagram. Suspect dysphagia is oropharyngeal and would monitor on diet. GERD     Recommendations:   - will add protonix daily for GERD  - dysphagia diet per SLP    Discussed with Dr. Robb Kuhn, PA-C  [facility]    I have personally performed a face to face diagnostic evaluation on this patient. I have interviewed and examined the patient and I agree with the findings and recommended plan of care. In summary, my findings and plan are the followin-year-old  female who was admitted for recurrent syncope. On work-up here,  she was found to have severe hypokalemia. GI has been consulted for chronic dysphagia. Patient has solid and liquid dysphagia for couple of years. She feels it can get caught in her throat and she will start coughing. She also has intermittent heartburn. She had been previously evaluated by Dr. Akhil Vargas in 2016 for dysphagia. Her EGD at that time was normal.  She has seen speech pathology here. She denies any recurrent pneumonias. Vital signs are stable. Abdominal exam benign  Patient has chronic dysphagia. Her barium esophagram showed tertiary contractions with no mass, strictures or achalasia. Modified barium swallow revealed penetration but no aspiration. 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 Continue diet recommendations per speech pathologist.  There is no role for repeat EGD since the barium esophagram is normal (no stricture that needs dilation). If the patient's swallowing issues worsen, we could potentially place a PEG. I do not think PEG tube if clinically indicated at this time. Furthermore, patient refuse to give consent for PEG if the need arise. GI will sign off. Please call if you have any questions.     Boni Weston MD, MSc  [facility] [address]

## 2019-07-10 NOTE — PROCEDURES
trials  -Minimal laryngeal penetration during soft solid trial  -Mild residue post swallow in valleculae with all textures  -No aspiration was viewed with any texture during study    Esophageal Phase  -Pt had esophagram following MBS, please refer to report for further details    Following Evaluation:  Results/recommendations and education given to Patient and nurse, who verbalized understanding    Timed Code Treatment: 0 minutes    Total Treatment Time:  30 minutes    JOE Glasgow   Clinician      The speech-language pathologist was present, directed the patient's care, made skilled judgment and was responsible for assessment and treatment.     Maddie Nunez M.A., [address] [place] [address]  Speech-Language Pathologist

## 2019-07-11 NOTE — PROGRESS NOTES
Speech Language Pathology  Dysphagia Treatment Note    Name:  Rodger Walker  :   1931  Medical Diagnosis:  Syncope and collapse [R55]  Syncope and collapse [R55]  Syncope and collapse [R55]  Treatment Diagnosis: Oropharyngeal Dysphagia  Pain level: Denies     Current Diet Level: Dysphagia III (Advanced) diet with thin liquids, meds in puree  Tolerance of Current Diet Level:Per chart review, no documented difficulties with current diet level noted. Pt denied any difficulty with current diet level. Assessment of Texture Tolerance:  -Impressions: Pt was positioned upright in bed on RA taking meds with puree upon SLP entrance into room. Pt demonstrated decreased A-P propulsion with suspected delayed swallow initiation. Post medications pt demonstrated dry coughing stating \"something went down wrong. \" This could be consistent with laryngeal penetration as viewed on Modified Barium Swallow. Results of Modified Barium Swallow were reviewed with pt and daughter with images viewed in PACS. Discussed rationale for current diet level and recommendations. Pt and daughter verbalized understanding. Diet and Treatment Recommendations:  Continue current diet recommendations. (Dysphagia Goals addressed, if appropriate)  1)Pt will tolerate diet with no signs or symptoms of aspiration (ongoing 2019)     Plan:  Continued daily Dysphagia treatment with goals per plan of care. Patient/Family Education:Education given to the Pt and nurse, who verbalized understanding    Discharge Recommendations:  Pt will benefit from continued skilled Speech Therapy for Dysphagia services, prior to returning home.     Timed Code Treatment:  0 minutes     Total Treatment Time: 20 minutes     Yany Maki [address]  Speech Language Pathologist

## 2019-07-11 NOTE — PROGRESS NOTES
Diagnoses of Hypokalemia and Orthostatic hypotension were also pertinent to this visit. has a past medical history of Anemia, Asthma, Blood circulation, collateral, Clostridium difficile diarrhea, Constipation, COPD (chronic obstructive pulmonary disease) ([address] DDD (degenerative disc disease), cervical, Essential hypertension, GERD (gastroesophageal reflux disease), History of traumatic head injury, Hypothyroidism, Major depressive disorder, Oropharyngeal dysphagia, Orthostatic hypotension, Osteopenia, Other disorders of kidney and ureter in diseases classified elsewhere, Pacemaker, Paroxysmal atrial fibrillation ([address] Partial epilepsy, with intractable epilepsy, pharmacoresistant ([address] Personal history of malignant neoplasm of cervix uteri, Personal history of PE (pulmonary embolism), Pneumonia, Post traumatic seizure ([address] Seizures ([address] Sinus node dysfunction ([address] and Spinal stenosis of lumbar region. has a past surgical history that includes refugio and bso (cervix removed) (9/2010); Tubal ligation; Rotator cuff repair (Right); Upper gastrointestinal endoscopy (02/2016); Colonoscopy (04/2013); and pacemaker placement (Left). Restrictions  Restrictions/Precautions  Restrictions/Precautions: Fall Risk(high fall risk)  Required Braces or Orthoses?: No  Position Activity Restriction  Other position/activity restrictions: Keegan Varma is a 80 y.o. female who presents to the emergency department with chief complaint of syncope x4 today. She has a history of multiple episodes of syncope. She denies any vision changes, facial droop, slurred speech, numbness or weakness of the extremities. No headache or vomiting. No diarrhea. No recent blood pressure medication changes. EMS reported that they checked orthostatics which were positive. She has a history of this over the past year and is very frequent.   Note that although she passed out today her daughter caught her and she did not hit her head.  Subjective   General  Chart Reviewed: Yes  Additional Pertinent Hx: COPD, Osteopenia, Anemia, Seizures  Response To Previous Treatment: Patient with no complaints from previous session. Family / Caregiver Present: Yes(daughter present throughout session)  Subjective  Subjective: Pt reports no pain today and that she is feeling much better. General Comment  Comments: Pt supine in bed upon arrival, agreeable to PT session.   Pain Screening  Patient Currently in Pain: Denies  Vital Signs  Patient Currently in Pain: Denies       Orientation  Orientation  Overall Orientation Status: Within Functional Limits  Cognition      Objective   Bed mobility  Supine to Sit: Stand by assistance  Sit to Supine: Stand by assistance  Scooting: Contact guard assistance  Comment: HOB elevated, increased time required to complete  Transfers  Sit to Stand: Contact guard assistance(from toilet x2, from bed x1)  Stand to sit: Contact guard assistance  Ambulation  Ambulation?: Yes  More Ambulation?: No  Ambulation 1  Surface: level tile  Device: Rolling Walker  Assistance: Contact guard assistance  Quality of Gait: Decreased inge and step length, staggered path  Distance: 250'   Comments: frequent VC to remain inside of RW and to avoid obstacles, slight SOB at end of ambulation   Stairs/Curb  Stairs?: No     Balance  Posture: Good  Sitting - Static: Good  Sitting - Dynamic: Fair;+  Standing - Static: Fair;+(Min A required to avoid LOB while pt is donning pants and breifs )  Standing - Dynamic: Fair         G-Code     OutComes Score       AM-PAC Score  AM-PAC Inpatient Mobility Raw Score : 18 (07/11/19 1501)  AM-PAC Inpatient T-Scale Score : 43.63 (07/11/19 1501)  Mobility Inpatient CMS 0-100% Score: 46.58 (07/11/19 1501)  Mobility Inpatient CMS G-Code Modifier : CK (07/11/19 1501)          Goals  Short term goals  Time Frame for Short term goals: Prior to d/c  Short term goal 1: Patient will perform all bed mobility with Mod

## 2019-07-11 NOTE — PROGRESS NOTES
[facility]   Electrophysiology Progress Note     Date: 7/11/2019  Admit Date: 7/8/2019     Reason for consultation: Syncope    Chief Complaint:   Chief Complaint   Patient presents with    Loss of Consciousness     reporting been passing out a lot with standing, pt also reports some blurred vision and weakness       History of Present Illness: History obtained from patient and medical record. Charly Smith is a 80 y.o. female who presented to the hospital with syncopal episodes. Began to feel shaky while ambulating to the bathroom and knew she was going to pass out, but her daughter caught her and she did not fall. Her daughter reports that the pt has lost significant weight over the last year due to poor oral intake despite being on megace. BP been running in the 70s the last few days at home, while on midodrine and mestinon upon admission. Mestinon has been stopped due to urinary frequency. She was also found to be profoundly hypokalemic on admit with a potassium of 2.1 despite being on a potassium supplement at home. GI consulted for dysphagia and heartburn. No strictures on esophagram.    She has a past medical history of seizures, PPM implantation for sinus node dysfunction, AF RVR treated with flecainide, recurrent syncopal episodes due to orthostatic hypotension and low blood pressure. Has had multiple evaluations in the past including checking her pacemaker with no arrhythmia associated with her syncopal episodes.       Interval Hx: Today, she is feeling ok. C/o continued dizziness episodes while ambulating to BR, symptoms improve with sitting. No syncope or arrhythmias noted on tele. BP remains elevated, but had readings in the 83O systolic prior to admission. Pacemaker interrogation this morning also showed no arrhythmias at home. Patient seen and examined. Clinical notes reviewed. Telemetry reviewed. No new complaints today. No major events overnight.    Denies having chest pain, BY MOUTH EVERY DAY AT LUNCH Yes Kira Mcgraw MD   levalbuterol (XOPENEX) 0.63 MG/3ML nebulization Take 3 mLs by nebulization every 4 hours as needed for Wheezing Yes Luis Angel Coley MD   flecainide (TAMBOCOR) 50 MG tablet Take 1 tablet by mouth every 12 hours Yes Kira Mcgraw MD   tiotropium (SPIRIVA RESPIMAT) 2.5 MCG/ACT AERS inhaler Inhale 2 puffs into the lungs daily Yes Kira Mcgraw MD   vitamin B-12 (CYANOCOBALAMIN) 1000 MCG tablet Take 1 tablet by mouth daily Yes Caro Arias MD   multivitamin [facility]) per tablet Take 1 tablet by mouth daily. Yes Historical Provider, MD   nystatin (MYCOSTATIN) 885433 UNIT/ML suspension Take 500,000 Units by mouth 4 times daily as needed  Historical Provider, MD   nitroGLYCERIN (NITROSTAT) 0.4 MG SL tablet up to max of 3 total doses. If no relief after 1 dose, call 911.   Ignacio Whipple MD      Scheduled Meds:   potassium chloride  40 mEq Oral Once    pantoprazole  40 mg Oral QAM AC    multivitamin  1 tablet Oral Daily    vitamin B-12  1,000 mcg Oral Daily    flecainide  50 mg Oral 2 times per day    tiotropium  2 puff Inhalation Daily    [Held by provider] midodrine  10 mg Oral TID WC    megestrol  400 mg Oral Daily    sertraline  100 mg Oral Daily    fludrocortisone  100 mcg Oral BID    levothyroxine  50 mcg Oral Daily    ferrous sulfate  325 mg Oral Daily with breakfast    sodium chloride flush  10 mL Intravenous 2 times per day    enoxaparin  40 mg Subcutaneous Nightly    potassium chloride  40 mEq Oral Once    levETIRAcetam  1,500 mg Oral BID     Continuous Infusions:  PRN Meds:acetaminophen, potassium chloride **OR** [DISCONTINUED] potassium alternative oral replacement **OR** potassium chloride, labetalol, albuterol, sodium chloride flush, magnesium hydroxide, ondansetron, hydrALAZINE     Past Medical History:  Past Medical History:   Diagnosis Date    Anemia     Asthma     Blood circulation, collateral     Clostridium reviewed as a part of this visit    Labs:   BMP:   Recent Labs     19  1526 19  0500  07/10/19  0343 07/10/19  1228 19  0446    142  --   --   --  147*   K 2.1* 2.4*   < > 2.8* 2.9* 3.1*   CL 96* 101  --   --   --  110   CO2 30 26  --   --   --  25   BUN 16 12  --   --   --  11   CREATININE 0.8 0.6  --   --   --  0.6   MG 2.00 1.90  --   --   --   --     < > = values in this interval not displayed. CBC:   Recent Labs     19  1526   WBC 4.6   HGB 13.1   HCT 37.2   MCV 93.3        Thyroid:   Lab Results   Component Value Date    TSH 2.58 2017    W3FKAHY 7.1 2011     Lipids:   Lab Results   Component Value Date    CHOL 178 2019    HDL 54 2019    TRIG 97 2019     LFTS:   Lab Results   Component Value Date    ALT 15 2019    AST 24 2019    ALKPHOS 51 2019    PROT 7.0 2019    PROT 7.4 2011    AGRATIO 1.4 2019    BILITOT 0.4 2019     Cardiac Enzymes:   Lab Results   Component Value Date    TROPONINI <0.01 2019    TROPONINI <0.01 2019    TROPONINI <0.01 2019     Coags:   Lab Results   Component Value Date    PROTIME 12.2 2019    INR 1.07 2019       EC2019  SR, rate 65, QTc 474. ST-T changes with prolonged QT interval    ECHO: 2019  Left ventricle - normal size, thickness and function with EF of 70%   Mitral valve - trivial regurgitation   Pacer wire noted in right ventricle and right atrium.   Tricuspid valve - mild regurgitation with RVSP estimated at 36 mmHg. Stress Test: 10/24/2017  Normal myocardial perfusion study.   Normal LV size and systolic function.     LHC: 2018   Normal coronaries with no intervention    Problem List:   Patient Active Problem List    Diagnosis Date Noted    Unstable angina ([address] 2018     Priority: High    Essential hypertension 2016     Priority: High    Orthostatic hypotension 2015     Priority: High    Partial daughter verbalized understanding and agreed with the plan. Thank you for allowing to us to participate in the care of Liana Troncoso.     YOUSUF Penaloza-DAYAN  [address]   Office: [phone]

## 2019-07-11 NOTE — CONSULTS
 Personal history of PE (pulmonary embolism)     post surgical    Pneumonia     [facility]     Seizures ([facility]     Sinus node dysfunction (HCC)     Spinal stenosis of lumbar region        Past Surgical History:   Procedure Laterality Date    COLONOSCOPY  04/2013    normal except diverticulosis    PACEMAKER PLACEMENT Left     MRI compatible    PACEMAKER PLACEMENT Left     ROTATOR CUFF REPAIR Right     ROLAND AND BSO  9/2010    cervical cancer, Dr. German Madera ENDOSCOPY  02/2016    normal       Family History   Problem Relation Age of Onset    Stroke Mother     Parkinsonism Mother     High Blood Pressure Mother     Cancer Daughter         breast    Other Father         black lung    Lung Cancer Brother     Breast Cancer Maternal Aunt     Heart Disease Neg Hx     High Cholesterol Neg Hx         reports that she has never smoked. She has never used smokeless tobacco. She reports that she does not drink alcohol or use drugs. Allergies:  Aricept [donepezil hcl];  Cymbalta [duloxetine hcl]; and Sulfa antibiotics    Current Medications:      pantoprazole (PROTONIX) tablet 40 mg QAM AC   acetaminophen (TYLENOL) tablet 650 mg Q6H PRN   potassium chloride (KLOR-CON M) extended release tablet 40 mEq PRN   Or    potassium bicarb-citric acid (EFFER-K) effervescent tablet 40 mEq PRN   Or    potassium chloride 10 mEq/100 mL IVPB (Peripheral Line) PRN   labetalol (NORMODYNE;TRANDATE) injection 10 mg Q6H PRN   multivitamin 1 tablet Daily   vitamin B-12 (CYANOCOBALAMIN) tablet 1,000 mcg Daily   flecainide (TAMBOCOR) tablet 50 mg 2 times per day   tiotropium (SPIRIVA RESPIMAT) 2.5 MCG/ACT inhaler 2 puff Daily   albuterol (PROVENTIL) nebulizer solution 1.25 mg Q6H PRN   [Held by provider] midodrine (PROAMATINE) tablet 10 mg TID WC   megestrol (MEGACE) 40 MG/ML suspension 400 mg Daily   sertraline (ZOLOFT) tablet 100 mg Daily   fludrocortisone (FLORINEF)

## 2019-07-11 NOTE — PROGRESS NOTES
Hospitalist Progress Note      PCP: Lang Jeans, MD    Date of Admission: 7/8/2019    Chief Complaint: syncope    HPI:-87 y.o. female with PMH of recurrent syncope, COPD, htn, GERD, hypothyroidism, PAF, seizures who presents with syncopal episode. Has been having recurrent symptoms for the 2-3 years. Today, pt with repeat episode. Associated with dizziness, lightheadedness and some chest pressure  Denies palpitations, chest pain, head trauma, focal motor or sensory deficits, severe diarrhea, gross bleeding. Per EMS, orthostatics found to be positive. Incidentally found to be hypokalemic in ED.     Subjective:   Feeling better today she was able to sleep after 3-4 nights   positive orthostatic hypotension   BP uncontrolled otherwise  Hypokalemia little better  Daughter at bedside  No new complaints   Pacemaker interrogated no atrial fibrillation noted    Medications:  Reviewed    Infusion Medications   Scheduled Medications    potassium chloride  40 mEq Oral Once    levETIRAcetam  750 mg Oral BID    ferrous sulfate  325 mg Oral Lunch    pantoprazole  40 mg Oral QAM AC    multivitamin  1 tablet Oral Daily    vitamin B-12  1,000 mcg Oral Daily    flecainide  50 mg Oral 2 times per day    tiotropium  2 puff Inhalation Daily    [Held by provider] midodrine  10 mg Oral TID WC    megestrol  400 mg Oral Daily    sertraline  100 mg Oral Daily    fludrocortisone  100 mcg Oral BID    levothyroxine  50 mcg Oral Daily    sodium chloride flush  10 mL Intravenous 2 times per day    enoxaparin  40 mg Subcutaneous Nightly    potassium chloride  40 mEq Oral Once     PRN Meds: acetaminophen, potassium chloride **OR** [DISCONTINUED] potassium alternative oral replacement **OR** potassium chloride, labetalol, albuterol, sodium chloride flush, magnesium hydroxide, ondansetron, hydrALAZINE      Intake/Output Summary (Last 24 hours) at 7/11/2019 1305  Last data filed at 7/11/2019 1034  Gross per 24 hour

## 2019-07-12 NOTE — DISCHARGE SUMMARY
normal.  Barium swallow showed penetration but no aspiration. We will continue PPI, dietary recommendations per speech pathology. Shunt swallowing issues worsen she could potentially benefit from a PEG tube. Invasive procedures:  None     Discharge Diagnoses: Active Problems:    Syncope and collapse  Resolved Problems:    * No resolved hospital problems. *      Physical Exam: /78   Pulse 85   Temp 97.7 °F (36.5 °C) (Oral)   Resp 18   Ht 5' 1\" (1.549 m)   Wt 116 lb (52.6 kg)   SpO2 95%   BMI 21.92 kg/m²   Gen/overall appearance: Not in acute distress. Alert. Head: Normocephalic, atraumatic  Eyes: EOMI, no scleral icterus  CVS: regular rate and rhythm, Normal S1S2  Pulm: Clear to auscultation bilaterally. No crackles/wheezes  Gastrointestinal: Soft, nontender, nondistended, no guarding or rebound  Extremities: No edema. No erythema or warmth  Neuro: No gross focal deficits noted  Skin: Warm, dry      Significant diagnostic studies that may require follow up:  Fl Esophagram    Result Date: 7/10/2019  EXAMINATION: SINGLE CONTRAST ESOPHAGRAM 7/10/2019 HISTORY: ORDERING SYSTEM PROVIDED HISTORY: dysphagia TECHNOLOGIST PROVIDED HISTORY: Reason for exam:->dysphagia Reason for Exam: Dysphagia Acuity: Unknown Type of Exam: Unknown COMPARISON: None. TECHNIQUE: Multiple single contrast images of the esophagus and gastroesophageal junction were obtained following the oral administration of thin barium. FLUOROSCOPY DOSE AND TYPE OR TIME AND EXPOSURES: 11 fluoroscopic spot images obtained over 54 seconds of fluoroscopy time FINDINGS: Esophagus is normal in caliber. Tertiary contractions are noted intermittently. No mucosal abnormality, stricture or filling defect is noted. GE junction appears unremarkable in appearance. No reflux is identified.      Tertiary contractions in an otherwise unremarkable single-contrast esophagram     Xr Chest Portable    Result Date: 7/8/2019  EXAMINATION: ONE XRAY VIEW OF THE CHEST 7/8/2019 3:36 pm COMPARISON: 03/30/2019 HISTORY: ORDERING SYSTEM PROVIDED HISTORY: cp TECHNOLOGIST PROVIDED HISTORY: Reason for exam:->cp Reason for Exam: Pt states chest pain. Pt states weakness and blurred vision. Pt states she has been passing out after standing up. Acuity: Unknown Type of Exam: Initial FINDINGS: Single portable frontal view of the chest is submitted for review. The cardiac silhouette is normal in size. Lung parenchyma is clear without focal airspace consolidation, sizeable pleural effusion, or pneumothorax. Implantable cardiac device leads overlying the right atrium and ventricle. Generalized interstitial prominence, suggestive of underlying COPD. Trachea is midline. Visualized osseous structures and soft tissues are grossly intact. No acute cardiopulmonary pathology. COPD. Fluoroscopy Modified Barium Swallow With Video    Result Date: 7/10/2019  EXAMINATION: MODIFIED BARIUM SWALLOW WAS PERFORMED IN CONJUNCTION WITH SPEECH PATHOLOGY SERVICES TECHNIQUE: Fluoroscopic evaluation of the swallowing mechanism was performed with multiple consistency of barium product. FLUOROSCOPY DOSE AND TYPE OR TIME AND EXPOSURES: 12 fluoroscopic sequences obtained over 1 minute and 18 seconds of fluoroscopy time COMPARISON: None HISTORY: ORDERING SYSTEM PROVIDED HISTORY: dysphagia TECHNOLOGIST PROVIDED HISTORY: Reason for exam:->dysphagia Reason for Exam: Dysphagia Acuity: Unknown Type of Exam: Unknown FINDINGS: Oral phase demonstrates mild dysmotility, dysphagia. There is flash penetration with thin liquids intermittently. No aspiration identified. Mild laryngeal penetration noted with soft solid trial.     Penetration without evidence of aspiration. Please see separate speech pathology report for full discussion of findings and recommendations.      Xr Hip 2-3 Vw W Pelvis Right    Result Date: 6/26/2019  EXAMINATION: ONE XRAY VIEW OF THE PELVIS AND TWO XRAY VIEWS RIGHT HIP 6/26/2019 1:05 pm

## 2019-07-12 NOTE — PROGRESS NOTES
Priority: Medium    Hypothyroidism 03/19/2015     Priority: Medium    Constipation 03/02/2015     Priority: Low    Spinal stenosis of lumbar region 07/07/2014     Priority: Low    DDD (degenerative disc disease), lumbar 06/06/2013     Priority: Low    Osteopenia 06/06/2013     Priority: Low    Late onset Alzheimer's disease without behavioral disturbance 05/23/2019    Failure to thrive in adult 05/23/2019    Syncope and collapse 03/30/2019    Nausea and vomiting 03/30/2019    Abnormal EKG 03/30/2019    Hypokalemia     Dysarthria 01/03/2019    Hypertensive emergency     Hypertensive urgency 12/13/2018    Aspiration into airway 10/11/2018    Chest pain 07/01/2018    Seizure-like activity (HCC)     Shaking     Partial seizure (HCC)     Mild episode of recurrent major depressive disorder ([address] 11/29/2017    SOB (shortness of breath)     COPD, mild (HCC)     DDD (degenerative disc disease), cervical 08/07/2017    Paroxysmal atrial fibrillation ([address] 01/23/2017    Pacemaker 10/03/2016    Bradycardia     Sinus node dysfunction ([facility] 09/26/2016    Oropharyngeal dysphagia 03/02/2016      Active Hospital Problems    Diagnosis Date Noted    Syncope and collapse [R55] 03/30/2019       Assessment and plan:    Orthostatic hypotension:    Likely with autonomic dysfunction. Differential diagnosis for autonomic failure is broad and include Parkinson disease, Dementia with Lewy bodies, Multiple system atrophy, advanced age, Amyloidosis, Collagen vascular disorders, B12 deficiency, toxins, HIV, sarcoidosis, paraneoplastic autonomic neuropathy    Needs full workup with internal medicine team for above diagnoses. Avoid medications the exacerbate orthostatic hypotension: diuretics, betablocker drugs, NTG, Calcium channel blockers, Hydralazine    D/c Norvasc    Arising slowly, in stages, from supine to seated to standing.  This maneuver is most important in the morning, when orthostatic tolerance is

## 2019-07-12 NOTE — PROGRESS NOTES
post surgical    Pneumonia     [facility] ([facility]     Seizures ([facility]     Sinus node dysfunction (HCC)     Spinal stenosis of lumbar region        Past Surgical History:   Procedure Laterality Date    COLONOSCOPY  04/2013    normal except diverticulosis    PACEMAKER PLACEMENT Left     MRI compatible    ROTATOR CUFF REPAIR Right     ROLAND AND BSO  9/2010    cervical cancer, Dr. Marybeth Blackman ENDOSCOPY  02/2016    normal         Current Medications:      amLODIPine (NORVASC) tablet 5 mg Daily   levETIRAcetam (KEPPRA) tablet 750 mg BID   ferrous sulfate tablet 325 mg Lunch   pantoprazole (PROTONIX) tablet 40 mg QAM AC   acetaminophen (TYLENOL) tablet 650 mg Q6H PRN   potassium chloride (KLOR-CON M) extended release tablet 40 mEq PRN   Or    potassium chloride 10 mEq/100 mL IVPB (Peripheral Line) PRN   labetalol (NORMODYNE;TRANDATE) injection 10 mg Q6H PRN   multivitamin 1 tablet Daily   vitamin B-12 (CYANOCOBALAMIN) tablet 1,000 mcg Daily   flecainide (TAMBOCOR) tablet 50 mg 2 times per day   tiotropium (SPIRIVA RESPIMAT) 2.5 MCG/ACT inhaler 2 puff Daily   albuterol (PROVENTIL) nebulizer solution 1.25 mg Q6H PRN   [Held by provider] midodrine (PROAMATINE) tablet 10 mg TID WC   megestrol (MEGACE) 40 MG/ML suspension 400 mg Daily   sertraline (ZOLOFT) tablet 100 mg Daily   fludrocortisone (FLORINEF) tablet 100 mcg BID   levothyroxine (SYNTHROID) tablet 50 mcg Daily   sodium chloride flush 0.9 % injection 10 mL 2 times per day   sodium chloride flush 0.9 % injection 10 mL PRN   magnesium hydroxide (MILK OF MAGNESIA) 400 MG/5ML suspension 30 mL Daily PRN   ondansetron (ZOFRAN) injection 4 mg Q6H PRN   enoxaparin (LOVENOX) injection 40 mg Nightly   potassium chloride (KLOR-CON M) extended release tablet 40 mEq Once   hydrALAZINE (APRESOLINE) injection 5 mg Q6H PRN           Physical exam:     Vitals:  BP (!) 178/83   Pulse 83   Temp 97.9 °F (36.6 °C) (Temporal) pathology report for full discussion of findings   and recommendations. XR CHEST PORTABLE   Final Result   No acute cardiopulmonary pathology. COPD. I/O last 3 completed shifts: In: 940 [P.O.:930; I.V.:10]  Out: 180 [Urine:180]      Assessment/Plan :      1. Hypokalemia - improving   Had renal loss  Supplement PO as needed      2. HTN. Has supine HTN . Will start low dose amlodipine  Has orthostatic hypotension. Continue midodrine in day time     3. hypernatremia - mild .    Encourage oral water intake                 Thank you for allowing us to participate in care of Glenis Still         Electronically signed by: Sue Lucas MD, 7/12/2019, 9:08 AM      [facility] [id number]  Office : [phone]  Fax :[phone]

## 2019-07-15 NOTE — ED PROVIDER NOTES
N/A    CONSULTS:  None      EMERGENCY DEPARTMENT COURSE and DIFFERENTIAL DIAGNOSIS/MDM:   Vitals:    Vitals:    07/15/19 1715 07/15/19 1800 07/15/19 1815 07/15/19 1830   BP: (!) 179/93 (!) 180/90 (!) 184/92 (!) 181/91   Pulse: 79 81 78 76   Resp: 14 16 16 19   Temp:       TempSrc:       SpO2: 96% 94% 97% 97%   Weight:       Height:           Patient was given thefollowing medications:  Medications   potassium chloride (KLOR-CON) extended release tablet 40 mEq (40 mEq Oral Given 7/15/19 1833)   meclizine (ANTIVERT) tablet 12.5 mg (12.5 mg Oral Given 7/15/19 1833)       Patient presents for evaluation of elevated blood pressure reading. On exam, she is resting comfortably in bed in no acute distress and nontoxic. She is hypertensive but vitals otherwise stable and she is afebrile. She is alert and oriented x3. GCS 15. Cranial nerves II through XII are intact. She has no focal neurologic deficits appreciated on exam is acting appropriate at baseline per family. Lungs are clear to auscultation bilaterally, chest is nontender and abdomen is benign. Please see attending note for EKG interpretation. CBC and CMP are remarkable for hypokalemia with potassium of 2.9. Troponin is negative. Urinalysis negative. Chest x-ray shows no acute cardiopulmonary disease. She was given oral supplementation of potassium in the ED and into symptomatic relief, sent home with prescriptions for this and encouraged to follow closely with PCP for reevaluation, tight blood pressure control and repeat potassium. Patient has a normal repeat neurological exam. At this time there is no indication of head injury, encephalopathy, multiple sclerosis, TIA/CVA, seizures, dehydration, hypoglycemia, mass lesions, metabolic cause, cardiac arrhythmia, PE, GI bleeding or orthostatic hypotension. Patient is stable throughout ED course and safe for outpatient follow-up.  Patient made aware of treatment plan and verbally understood and agreed. Patient stable for outpatient follow-up with their family doctor in 24-48 hours. She is agreeable to this means stable for discharge at this time. FINAL IMPRESSION      1. Hypertension, unspecified type    2. Hypokalemia    3. Dizziness          DISPOSITION/PLAN   DISPOSITION Decision To Discharge 07/15/2019 06:35:46 PM      PATIENT REFERREDTO:  Tristen Gu MD  [address] Emergency Department  [facility] [address]  [phone]  Go to   If symptoms worsen      DISCHARGE MEDICATIONS:  Discharge Medication List as of 7/15/2019  7:14 PM      START taking these medications    Details   ! ! potassium chloride (KLOR-CON M) 20 MEQ extended release tablet Take 1 tablet by mouth daily for 10 days Take this medication when using Lasix., Disp-10 tablet, R-0Print      meclizine (ANTIVERT) 25 MG tablet Take 1 tablet by mouth 3 times daily as needed for Dizziness, Disp-15 tablet, R-0Print       !! - Potential duplicate medications found. Please discuss with provider.           DISCONTINUED MEDICATIONS:  Discharge Medication List as of 7/15/2019  7:14 PM                 (Please note that portions ofthis note were completed with a voice recognition program.  Efforts were made to edit the dictations but occasionally words are mis-transcribed.)    Molly Mireles PA-C (electronically signed)            Jazz Parisi PA-C  07/15/19 2367

## 2019-07-15 NOTE — PROGRESS NOTES
Speech/Language Pathology  Discharge Note    Name: Tiburcio Kamara   : 1931     Speech Therapy/Dysphagia  Pt discharged to home on 19. Bedside Swallow Eval completed 19 and MBS completed 7/10/19 (see reports). No goals met prior to discharge. Recommend: Continued Dysphagia treatment at home, with goals and treatment per [facility].     DIET LEVEL AT DISCHARGE:  Dysphagia III (Advanced) diet with thin liquids, meds in puree    DYSPHAGIA GOALS:  1)Pt will tolerate diet with no signs or symptoms of aspiration (GOAL NOT MET)    lEiza Rabago M.A., [address] [place] [address]  Speech-Language Pathologist

## 2019-07-15 NOTE — ED PROVIDER NOTES
MG/HR Place 1 patch onto the skin as needed (for supine Blood pressure systolic > 401 mmHg, when in bed.) 30 patch 3    fludrocortisone (FLORINEF) 0.1 MG tablet Take 1 tablet by mouth daily 60 tablet 1    pantoprazole (PROTONIX) 40 MG tablet Take 1 tablet by mouth every morning (before breakfast) 30 tablet 3    captopril (CAPOTEN) 12.5 MG tablet Take 1 tablet by mouth 2 times daily Hold for bp < 120/70 90 tablet 3    potassium chloride (KLOR-CON M) 10 MEQ extended release tablet Take 1 tablet by mouth daily 30 tablet 0    levETIRAcetam (KEPPRA) 750 MG tablet Take 750 mg by mouth 2 times daily       midodrine (PROAMATINE) 5 MG tablet Take 5 mg by mouth daily as needed Take 1 tablet as needed for SBP <100      sertraline (ZOLOFT) 100 MG tablet Take 150 mg by mouth daily       aspirin 81 MG tablet Take 81 mg by mouth daily      acetaminophen (TYLENOL) 325 MG tablet Take 650 mg by mouth 2 times daily      Cranberry 1000 MG CAPS Take 2,000 mg by mouth      lactobacillus (CULTURELLE) capsule Take 1 capsule by mouth daily      levothyroxine (SYNTHROID) 50 MCG tablet TAKE 1 TABLET BY MOUTH DAILY 90 tablet 3    megestrol (MEGACE) 40 MG/ML suspension Take 10 mLs by mouth daily 360 mL 6    ferrous sulfate 325 (65 Fe) MG tablet TAKE 1 TABLET BY MOUTH EVERY DAY WITH BREAKFAST (Patient taking differently: TAKE 1 TABLET BY MOUTH EVERY DAY AT LUNCH) 30 tablet 12    levalbuterol (XOPENEX) 0.63 MG/3ML nebulization Take 3 mLs by nebulization every 4 hours as needed for Wheezing 360 mL 5    flecainide (TAMBOCOR) 50 MG tablet Take 1 tablet by mouth every 12 hours 60 tablet 12    tiotropium (SPIRIVA RESPIMAT) 2.5 MCG/ACT AERS inhaler Inhale 2 puffs into the lungs daily 1 Inhaler 12    vitamin B-12 (CYANOCOBALAMIN) 1000 MCG tablet Take 1 tablet by mouth daily 30 tablet 3    multivitamin (THERAGRAN) per tablet Take 1 tablet by mouth daily.          PHYSICAL EXAM  Vitals: BP (!) 179/93   Pulse 79   Temp 97.7 °F (36.5 °C)

## 2019-07-15 NOTE — ED NOTES
Bed: 24  Expected date:   Expected time:   Means of arrival:   Comments:  RADHA Martin, DOUGLAS  07/15/19 0987

## 2019-08-09 NOTE — TELEPHONE ENCOUNTER
I spoke with pt daughter. She stated that nothing had changed since they last call except GARCIA was gone and she is light headed when getting up.

## 2019-08-09 NOTE — TELEPHONE ENCOUNTER
I spoke with daughter, Gene is sleeping and feeling better.   I instructed her to hold florinef and continue to monitor her b/p

## 2019-08-09 NOTE — TELEPHONE ENCOUNTER
She doesn't feel right , her cheeks are flushed and her b/p is 223/117. Daughter put on nitro patch and it has been on an hour and has not helped .  She had labs drawn today (ordered by pcp) told her potassium is critically low (see  labs)

## 2019-08-15 PROBLEM — F02.818 LATE ONSET ALZHEIMER'S DISEASE WITH BEHAVIORAL DISTURBANCE (HCC): Status: ACTIVE | Noted: 2019-01-01

## 2019-08-15 PROBLEM — E43 SEVERE MALNUTRITION (HCC): Chronic | Status: ACTIVE | Noted: 2019-01-01

## 2019-08-15 NOTE — ED PROVIDER NOTES
abused: Not on file     Forced sexual activity: Not on file   Other Topics Concern    Not on file   Social History Narrative    Not on file       SCREENINGS             PHYSICAL EXAM    (up to 7 for level 4, 8 or more for level 5)     ED Triage Vitals [08/14/19 2044]   BP Temp Temp Source Pulse Resp SpO2 Height Weight   (!) 147/81 97.7 °F (36.5 °C) Oral 70 16 98 % -- 105 lb (47.6 kg)       Physical Exam   Constitutional: She is oriented to person, place, and time. She appears well-developed and well-nourished. No distress. HENT:   Head: Normocephalic and atraumatic. Nose: Nose normal.   Mouth/Throat: Oropharynx is clear and moist.   Eyes: Conjunctivae and EOM are normal. Right eye exhibits no discharge. Left eye exhibits no discharge. Neck: Normal range of motion. Neck supple. Cardiovascular: Normal rate, regular rhythm and normal heart sounds. Exam reveals no gallop. No murmur heard. Pulmonary/Chest: Effort normal and breath sounds normal. No respiratory distress. She has no wheezes. She has no rales. Abdominal: Soft. There is no tenderness. Musculoskeletal: Normal range of motion. She exhibits no edema, tenderness or deformity. Neurological: She is alert and oriented to person, place, and time. Skin: Skin is warm and dry. Capillary refill takes less than 2 seconds. No rash noted. She is not diaphoretic. No erythema. No pallor. Psychiatric: She has a normal mood and affect. Her behavior is normal.   Nursing note and vitals reviewed.       DIAGNOSTIC RESULTS   LABS:    Labs Reviewed   URINE RT REFLEX TO CULTURE - Abnormal; Notable for the following components:       Result Value    Leukocyte Esterase, Urine TRACE (*)     All other components within normal limits    Narrative:     Performed at:  [facility] [address], [place], [address]   Phone [phone]   COMPREHENSIVE METABOLIC PANEL W/ REFLEX TO MG FOR LOW K - Abnormal; Notable for the following

## 2019-08-15 NOTE — PROGRESS NOTES
Nutrition Assessment    Type and Reason for Visit: Initial, Positive Nutrition Screen(MST=3; diff c/s)    Nutrition Recommendations:   1. Mixed Berry Ensure Clear TID with all meals. 2. Encourage PO and supplement intake. Nutrition Assessment: Patient's daughter at bedside during [address] visit. Patient and patient's daughter report patient has had a decreased appetite for the past 3 weeks. Typically eats 2 meals daily, per daughter. Reports issues swallowing liquids; states this has been going on for a year or more. Patient's daughter reports patient works with SLP at home and the SLP has not recommended thickened liquids. Likes Ensure Clear, but does not like Ensure Enlive or magic cup. Has had some weight loss over the past month. Daughter reports patient is on Megace. Malnutrition Assessment:  · Malnutrition Status: Meets the criteria for severe malnutrition  · Context: Acute illness or injury  · Findings of the 6 clinical characteristics of malnutrition (Minimum of 2 out of 6 clinical characteristics is required to make the diagnosis of moderate or severe Protein Calorie Malnutrition based on AND/ASPEN Guidelines):  1. Energy Intake-Less than or equal to 75% of estimated energy requirement, Greater than or equal to 7 days(~3-4 weeks)    2. Weight Loss-5% loss or greater, in 1 month  3. Fat Loss-Unable to assess,    4. Muscle Loss-Unable to assess,    5. Fluid Accumulation-No significant fluid accumulation,    6.  Strength-Not measured    Nutrition Risk Level: High    Nutrient Needs:  · Estimated Daily Total Kcal: 5021-3014  · Estimated Daily Protein (g): >/= 57(>/=1. 2)  · Estimated Daily Total Fluid (ml/day): 4378-9987(96-34)    Nutrition Diagnosis:   · Problem: Severe malnutrition  · Etiology: related to Insufficient energy/nutrient consumption     Signs and symptoms:  as evidenced by Weight loss greater than or equal to 5% in 1 month, Diet history of poor intake    Objective

## 2019-08-15 NOTE — CONSULTS
01/04/2019       ECG: Sinus rhythm with first degree AV block   Echo: Left ventricle - normal size, thickness and function with EF of 70%     Mitral valve - trivial regurgitation     Pacer wire noted in right ventricle and right atrium.     Tricuspid valve - mild regurgitation with RVSP estimated at 36 mmHg.       Scheduled Meds:   sodium chloride flush  10 mL Intravenous 2 times per day    enoxaparin  40 mg Subcutaneous Daily    aspirin  81 mg Oral Daily    captopril  12.5 mg Oral BID    flecainide  50 mg Oral 2 times per day    levETIRAcetam  750 mg Oral BID    levothyroxine  50 mcg Oral Daily    megestrol  800 mg Oral Daily    pantoprazole  40 mg Oral QAM AC    sertraline  150 mg Oral Daily    fludrocortisone  0.1 mg Oral Daily     Continuous Infusions:   sodium chloride 100 mL/hr at 08/15/19 0308     PRN Meds:.sodium chloride flush, magnesium hydroxide, ondansetron, levalbuterol, acetaminophen, hydrALAZINE, levalbuterol     Patient Active Problem List    Diagnosis Date Noted    Unstable angina ([facility] 07/11/2018     Priority: High    Essential hypertension 01/18/2016     Priority: High    Orthostatic hypotension 03/02/2015     Priority: High    Partial epilepsy with intractable epilepsy ([facility] 08/19/2014     Priority: High    COPD (chronic obstructive pulmonary disease) ([facility] 06/06/2013     Priority: High    Post traumatic seizure ([facility] 06/06/2013     Priority: High    Anemia 05/31/2016     Priority: Medium    Hypothyroidism 03/19/2015     Priority: Medium    Constipation 03/02/2015     Priority: Low    Spinal stenosis of lumbar region 07/07/2014     Priority: Low    DDD (degenerative disc disease), lumbar 06/06/2013     Priority: Low    Osteopenia 06/06/2013     Priority: Low    Severe malnutrition ([facility] 08/15/2019    Late onset Alzheimer's disease without behavioral disturbance 05/23/2019    Failure to thrive in adult 05/23/2019    Syncope and collapse 03/30/2019    Nausea and medicine team for above diagnoses. Avoid medications the exacerbate orthostatic hypotension: diuretics, betablocker drugs, NTG, Calcium channel blockers,      Will resume Florinef at lower doses. Use as needed hydralazine for SBP > 180 mmHg     Arising slowly, in stages, from supine to seated to standing. This maneuver is most important in the morning, when orthostatic tolerance is lowest. Elastic stockings, extended to the waist              Could not tolerate mestinon due to polyuria      I have discussed the need for further evaluation in a center with the specialty of autonomic dysfunction, e.g. [facility].          PAF:    Remains in sinus rhythm   Not on anticoagulation due to multiple falls risk of bleeding    SSS: Status post pacemaker implantation. Pacemaker functions normally    Multiple medical conditions with risk of decompensation. Thank you for allowing me to participate in the care of Tiffanie Hamptno     All questions and concerns were addressed to the patient/family. Alternatives to my treatment were discussed. I have discussed the above stated plan and the patient verbalized understanding and agreed with the plan. NOTE: This report was transcribed using voice recognition software. Every effort was made to ensure accuracy, however, inadvertent computerized transcription errors may be present.      Karen Bowen MD, MPH  [facility]   Office: [phone]

## 2019-08-15 NOTE — ED NOTES
Bed: 22  Expected date:   Expected time:   Means of arrival:   Comments:  Dirty - 116 Pascual Sanchez RN  08/14/19 2039

## 2019-08-15 NOTE — H&P
[facility] Medicine History & Physical      Patient Name: Kaila Montiel    : 1931    PCP: Karl Sheth MD    Date of Service:  Patient seen and examined on 8/15/2019     Chief Complaint:  Low blood pressure and syncope    History Of Present Illness:    Kaila Montiel is a 80 y.o. female who presented to ED with complaint of low BP and syncope. Family reports patient's BP at home was 74/54. However, when EMS arrived SBP ws back up to the 160s. Family member reports she took BP multiple times with an arm cuff with similar readings and also used it on herself and got a normal reading. Family member reports patient has had 2-3 episodes of syncope daily for the last several days. She denies head trauma and reports no injuries related to loss of consciousness. She reports she has had issues with this in the past and has been on midodrine and florinef. Most recently she was on florinef. It was discontinued last Friday due to hypertension. She reports she has had some shortness of breath and has been urinating frequently. She reports frequent urination has decreased since florinef was discontinued. She denies fever, chest pain, cough, edema, abdominal pain, N/V/D/C, pain with urination. Patient does have a history of seizures which are well controlled with keppra. Orthostatic vitals were performed in ED and were positive for orthostatic hypotension most notably when going from lying to sitting (146/77 > 116/58).      Past Medical History:    Patient  has a past medical history of Anemia, Asthma, Blood circulation, collateral, Clostridium difficile diarrhea, Constipation, COPD (chronic obstructive pulmonary disease) ([address] DDD (degenerative disc disease), cervical, Essential hypertension, GERD (gastroesophageal reflux disease), History of traumatic head injury, Hypothyroidism, Major depressive disorder, Oropharyngeal dysphagia, Orthostatic hypotension, Osteopenia, Other disorders of kidney and ureter in megestrol (MEGACE) 40 MG/ML suspension Take 10 mLs by mouth daily 4/10/19  Yes Olivia Padilla MD   ferrous sulfate 325 (65 Fe) MG tablet TAKE 1 TABLET BY MOUTH EVERY DAY WITH BREAKFAST  Patient taking differently: TAKE 1 TABLET BY MOUTH EVERY DAY AT LUNCH 11/12/18  Yes Chaparro Caldera MD   levalbuterol Marcheta Navarro) 0.63 MG/3ML nebulization Take 3 mLs by nebulization every 4 hours as needed for Wheezing 10/11/18  Yes Leopoldo Rily, MD   flecainide (TAMBOCOR) 50 MG tablet Take 1 tablet by mouth every 12 hours 9/7/18  Yes Chaparro Caldera MD   tiotropium (SPIRIVA RESPIMAT) 2.5 MCG/ACT AERS inhaler Inhale 2 puffs into the lungs daily 9/7/18  Yes Chaparro Caldera MD   vitamin B-12 (CYANOCOBALAMIN) 1000 MCG tablet Take 1 tablet by mouth daily 6/1/16  Yes Abena Michaels MD   potassium chloride (KLOR-CON M) 20 MEQ extended release tablet Take 1 tablet by mouth daily for 10 days Take this medication when using Lasix. 7/15/19 7/25/19  Padmini Emmanuel MD   meclizine (ANTIVERT) 25 MG tablet Take 1 tablet by mouth 3 times daily as needed for Dizziness 7/15/19   Padmini Emmanuel MD   nitroGLYCERIN (NITRODUR) 0.1 MG/HR Place 1 patch onto the skin as needed (for supine Blood pressure systolic > 602 mmHg, when in bed.) 7/12/19   Shanique Smith MD   multivitamin [facility]) per tablet Take 1 tablet by mouth daily. Historical Provider, MD       Allergies:  Aricept [donepezil hcl]; Cymbalta [duloxetine hcl]; and Sulfa antibiotics    Social History:      TOBACCO:   reports that she has never smoked. She has never used smokeless tobacco.  ETOH:   reports that she does not drink alcohol. DRUGS:  reports that she does not use drugs.     Family History:      Reviewed in detail positive as follows:        Problem Relation Age of Onset    Stroke Mother     Parkinsonism Mother     High Blood Pressure Mother     Cancer Daughter         breast    Other Father         black lung    Lung Cancer Brother     Final     HDL   Date/Time Value Ref Range Status   01/04/2019 05:01 AM 54 40 - 60 mg/dL Final     LDL Calculated   Date/Time Value Ref Range Status   01/04/2019 05:01  (H) <100 mg/dL Final         Radiology:     XR CHEST PORTABLE   Final Result   No acute process.              EKG:   Status: Preliminary result (8/14/2019 23:54)  Ventricular Rate 68  BPM   Atrial Rate 68  BPM   P-R Interval 212  ms   QRS Duration 84  ms   Q-T Interval 412  ms   QTc Calculation (Bazett) 438  ms   P Axis 50  degrees   R Axis 0  degrees   T Axis 43  degrees   Diagnosis     Sinus rhythm with 1st degree A-V block        Orthostatic Vitals  Blood Pressure Lying   146/77    Pulse Lying   72 PER MINUTE           Blood Pressure Sitting   116/58    Pulse Sitting   110 PER MINUTE           Blood Pressure Standing   128/64    Pulse Standing   82 PER MINUTE               ASSESSMENT/PLAN:    Orthostatic hypotension  Patient has a longstanding history of this with recent discontinuation of florinef  Possible dehydration contributing with BUN/Cr ratio of 26  IV NS at 100 ml/hr  Cardiology has been managing outpatient  Cardiology consulted    Syncope  Likely due to orthostatic hypotension  EKG shows sinus rhythm with 1st degree AV block  CXR negative for acute process  UA negative for UTI  Monitor on telemetry    Paroxysmal A fib  Continue home flecainide  No AC d/t fall risk    Seizure disorder  Well controlled  Continue home keppra  Check keppra level    Hyperkalemia  K 5.2  Sample shows hemolysis; will repeat in AM    HTN  Continue home captopril     Hypothyroidism  Continue home synthroid      DVT prophylaxis: Lovenox  GI prophylaxis: Protonix  Probiotic if on abx: N/A    Diet: DIET GENERAL;  Code Status: DNR-CCA    Consults:  IP CONSULT TO HOSPITALIST  IP CONSULT TO CARDIOLOGY  IP CONSULT TO SPIRITUAL SERVICES    Disposition: Admit to Inpatient   ELOS: Greater than two midnights due to medical therapy    Apryl Vines PA-C    Thank you Dylon Day MD for the opportunity to be involved in this patient's care. If you have any questions or concerns please feel free to contact me at [phone].

## 2019-08-15 NOTE — CONSULTS
breath was profound yesterday, patient was not having any significant coughing or expectoration, patient has intermittent wheezing, patient did not have any increasing rhinorrhea or nasal congestion, patient did not have any significant postnasal drainage, patient does feel that she has difficulty in swallowing, patient states that she feels that the food bolus does not go entirely to the food pipe, patient has had evaluation last month including a modified barium swallow and a esophagram which was negative for any oropharyngeal or esophageal dysphagia but patient was taught swallowing exercises by the speech therapist which she is following, patient does not have any otalgia but has tinnitus, patient does not have any epistaxis or hemoptysis, patient does not have any chest pain or palpitations, patient was not having any profound diaphoresis at the time of feeling weak and passing out, patient on questioning further states that she does not have any significant pleuritic chest pain, patient does not have any fever or chills, patient does have reflux symptoms of concern, patient does not have any altered bowel habits, patient does not have any increasing leg edema, patient did not have any change in the ambient environment, patient does not have any sick contacts, patient has history of COPD secondary to passive smoking and patient takes Spiriva on a regular basis, patient does have albuterol inhaler and nebulizer at home but patient does not use it because she feels jittery after using it, as per patient's daughter patient has history of fungal infection in her lungs which she contracted on a cruise ship about 15 to 20 years back, patient does not have a history of any occupational hazards, patient does not have history of any sleep fragmentation or snoring, no other pertinent review of system of concern  Patient's family does not think that Spiriva is making any difference for the patient  Patient when seen was Quant Latest Ref Range: 12.0 - 16.0 g/dL   13.7   Hematocrit Latest Ref Range: 36.0 - 48.0 %   41.3   MCV Latest Ref Range: 80.0 - 100.0 fL   98.7   MCH Latest Ref Range: 26.0 - 34.0 pg   32.6   MCHC Latest Ref Range: 31.0 - 36.0 g/dL   33.0   MPV Latest Ref Range: 5.0 - 10.5 fL   7.4   RDW Latest Ref Range: 12.4 - 15.4 %   12.3 (L)   Platelet Count Latest Ref Range: 135 - 450 K/uL   234     Results for Marybel Reza \"GENE\" (MRN [id number]) as of 8/15/2019 11:49   Ref. Range 7/15/2019 16:25 8/14/2019 22:13 8/15/2019 05:15   WBC Latest Ref Range: 4.0 - 11.0 K/uL 5.0 5.1 5.0   RBC Latest Ref Range: 4.00 - 5.20 M/uL 3.88 (L) 4.23 4.19   Hemoglobin Quant Latest Ref Range: 12.0 - 16.0 g/dL 12.8 13.9 13.7   Hematocrit Latest Ref Range: 36.0 - 48.0 % 36.4 41.3 41.3   MCV Latest Ref Range: 80.0 - 100.0 fL 93.9 97.4 98.7   MCH Latest Ref Range: 26.0 - 34.0 pg 33.0 32.7 32.6   MCHC Latest Ref Range: 31.0 - 36.0 g/dL 35.1 33.6 33.0   MPV Latest Ref Range: 5.0 - 10.5 fL 7.5 7.8 7.4   RDW Latest Ref Range: 12.4 - 15.4 % 12.9 12.4 12.3 (L)   Platelet Count Latest Ref Range: 135 - 450 K/uL 267 257 234   Neutrophils % Latest Units: % 60.7 57.1        CT spine in April 2019-  Limited evaluation of the lung apices demonstrates biapical lung scarring with no suspicious    pulmonary nodule included.       IMPRESSION:       Advanced multilevel cervical degenerative disc disease and facet arthrosis with chronic    posterior subluxation at C1-2 and degenerative anterior subluxations discussed above. Other    level specific details are discussed above including multilevel neural foraminal stenosis most    evident at C7.       No evidence of acute fracture or other acute traumatic abnormality involving the cervical    spine.      ONE XRAY VIEW OF THE CHEST       8/14/2019 10:01 pm       COMPARISON:   07/15/2019       HISTORY:   ORDERING SYSTEM PROVIDED HISTORY: sob   TECHNOLOGIST PROVIDED HISTORY:   Reason for exam:->sob   Reason for Exam:

## 2019-08-15 NOTE — FLOWSHEET NOTE
08/15/19 0945   Vital Signs   Patient Position Semi fowlers   Orthostatic B/P and Pulse? Yes   Blood Pressure Lying 144/77   Pulse Lying 68 PER MINUTE   Blood Pressure Sitting 136/74   Pulse Sitting 69 PER MINUTE   Blood Pressure Standing 87/58   Pulse Standing 71 PER MINUTE   Level of Consciousness 0   MEWS Score 1   Patient Currently in Pain Denies     When standing pt was unsteady on her feet. Pt denied dizziness.

## 2019-08-16 NOTE — PROGRESS NOTES
CLINICAL BEDSIDE SWALLOWING EVALUATION  Speech Therapy Department    Patient Name:  Keegan Varma  :  1931  Pain level: Denies   Medical Diagnosis:   Syncope and collapse [R55]    HPI:  Joaquim Hogan is a 80 y.o. female who presented to ED with complaint of low BP and syncope. Family reports patient's BP at home was 74/54. However, when EMS arrived SBP ws back up to the 160s. Family member reports she took BP multiple times with an arm cuff with similar readings and also used it on herself and got a normal reading. Family member reports patient has had 2-3 episodes of syncope daily for the last several days. She denies head trauma and reports no injuries related to loss of consciousness. She reports she has had issues with this in the past and has been on midodrine and florinef. Most recently she was on florinef. It was discontinued last Friday due to hypertension. She reports she has had some shortness of breath and has been urinating frequently. She reports frequent urination has decreased since florinef was discontinued. She denies fever, chest pain, cough, edema, abdominal pain, N/V/D/C, pain with urination. Patient does have a history of seizures which are well controlled with keppra. Orthostatic vitals were performed in ED and were positive for orthostatic hypotension most notably when going from lying to sitting (146/77 > 116/58). \"    Chest X-Ray:  Impression   No acute process.         CT Head:   Impression   No acute intracranial abnormality.       Stable findings compatible with age related atrophy and likely chronic small   vessel ischemic change.       Paranasal sinus disease as above. SLP eval and treat orders received this date. Pt with prior history of dysphagia. Most recently an MBS was completed on 7/10/19 \"Results indicate mild oropharyngeal dysphagia characterized by flash laryngeal penetration with thin liquids as trials progressed, and in combination with solid textures.  No aspiration was assessed during the study. \" Recommendations were Dysphagia III (Advanced) diet with thin liquids, meds in puree. Treatment Diagnosis: Mild Oropharyngeal Dysphagia    Impressions: Pt was seen upright in bed on RA. Pt was alert and engaged throughout entire evaluation. Pt's daughter was present for evaluation and provided the majority of pt's history. Pt's daughter stated she works with SLP at home and has exercises for dysphagia, however, has never been placed on thickened liquids. Pt stated she feels like things are stuck in her throat when she is eating and drinking and also feels like she get SOB during meals, which may be a result of previous history of GERD and COPD. Pt recently admitted 7/2019 and was placed on Dysphagia III with Thin Liquids following Modified Barium Swallow study. Pt's daughter stated she has continued providing pt with softer food options for meals at home. OME appeared to be grossly [place] [place]. Presented pt with thin liquids, purees, and regular solids. Pt with intermittent throat clearing with thin liquids and purees. Intermittent audible swallow noted with all consistencies indicating suspected intermittent laryngeal penetration. Pt stated she feels like she has to \"gulp\" with solid textures. Pt with prolonged mastication with regular solids. Pt utilized liquid wash with regular texture which appeared to be effective. Pt with mildly delayed swallow initiation and decreased laryngeal elevation via manual palpation with all textures. Pt's daughter stated that due to pt's recent weight loss they are encouraging any PO intake. Recommend Regular Texture Diet with Thin Liquids and meds as tolerated unless s/s aspiration/penetration and/or respiratory status changes are noted. Educated pt and pt's daughter re rationale for evaluation, diet recommendations, and aspiration/penetration precautions; verbalized understanding.      Dietary Recommendations: Regular Texture Diet with Thin

## 2019-08-16 NOTE — DISCHARGE SUMMARY
discharge. Time Spent on discharge is 1 hour  in the examination, evaluation, counseling and review of medications and discharge plan. Note that more than 30 minutes was spent in preparing discharge papers, discussing discharge with patient, medication review, etc.       Signed:    Dc To MD   8/16/2019      Thank you Julee Mccurdy MD for the opportunity to be involved in this patient's care.  If you have any questions or concerns please feel free to contact me at [address] [place] [address]

## 2019-08-16 NOTE — PROGRESS NOTES
[facility]   Electrophysiology Progress Note     Date: 8/16/2019  Admit Date: 8/14/2019     Reason for consultation: Syncope    Chief Complaint:   Chief Complaint   Patient presents with    Hypotension     Pt from home via [facility] for c/o low blood pressure. Family states pt's blood pressure was in the 70's today and that pt has had multiple episodes of LoC yesterday and today. History of Present Illness: History obtained from patient and medical record. Regina Cross is a 80 y.o. female with a past medical history of multiple syncopal episodes due to orthostatic hypotension, seizure, sinus node dysfunction, PPM implantation. She has severe autonomic dysfunction. Difficult to control hypertension due to orthostatic hypotension and syncopal episodes. Patient was on Florinef, which was stopped recently due to uncontrolled hypertension. Her daughter is her main caregiver and is at bedside to provide history. On admission, her blood pressure was very low and she had 2 episodes of syncope. Denies having any chest pressure or shortness of breath. During her last hospital visit, she was on Mestinon which was stopped due to urinary frequency. Interval Hx: Today, she is currently sleeping in bed. Daughter states \"She had a long night. She called me every hour. \" Confusion and hallucinations have been worsening over past month. Patient seen and examined. Clinical notes reviewed. Telemetry reviewed. No new complaints today. No major events overnight. Denies having chest pain, palpitations, shortness of breath, orthopnea/PND, cough, or dizziness at the time of this visit. Allergies: Allergies   Allergen Reactions    Aricept [Donepezil Hcl]      SE : hypotension     Cymbalta [Duloxetine Hcl] Other (See Comments)     Sleepy dizzy    Sulfa Antibiotics      Doesn't remember       Home Meds:  Prior to Visit Medications    Medication Sig Taking?  Authorizing Provider   midodrine (PROAMATINE) 5 when using Lasix. Bette France MD   meclizine (ANTIVERT) 25 MG tablet Take 1 tablet by mouth 3 times daily as needed for Dizziness  Bette France MD   nitroGLYCERIN (NITRODUR) 0.1 MG/HR Place 1 patch onto the skin as needed (for supine Blood pressure systolic > 327 mmHg, when in bed.)  Dominick Garcia MD   multivitamin [facility]) per tablet Take 1 tablet by mouth daily.     Historical Provider, MD      Scheduled Meds:   captopril  12.5 mg Oral BID    sodium chloride flush  10 mL Intravenous 2 times per day    enoxaparin  40 mg Subcutaneous Daily    aspirin  81 mg Oral Daily    flecainide  50 mg Oral 2 times per day    levETIRAcetam  750 mg Oral BID    levothyroxine  50 mcg Oral Daily    megestrol  800 mg Oral Daily    pantoprazole  40 mg Oral QAM AC    sertraline  150 mg Oral Daily    fludrocortisone  0.1 mg Oral Daily     Continuous Infusions:  PRN Meds:sodium chloride flush, magnesium hydroxide, ondansetron, levalbuterol, acetaminophen, hydrALAZINE, levalbuterol     Past Medical History:  Past Medical History:   Diagnosis Date    Anemia     Asthma     Blood circulation, collateral     Clostridium difficile diarrhea 10/21/2017    Constipation     COPD (chronic obstructive pulmonary disease) ([address]     DDD (degenerative disc disease), cervical     Essential hypertension     GERD (gastroesophageal reflux disease)     History of traumatic head injury 05/2005    Hypothyroidism     Major depressive disorder     Oropharyngeal dysphagia     Orthostatic hypotension     Osteopenia     Other disorders of kidney and ureter in diseases classified elsewhere     Pacemaker     Paroxysmal atrial fibrillation (HCC)     Partial epilepsy, with intractable epilepsy, pharmacoresistant ([address]     Personal history of malignant neoplasm of cervix uteri 6/6/2013    Personal history of PE (pulmonary embolism)     post surgical    Pneumonia     Post traumatic seizure ([address]     Seizures [facility])     Sinus node dysfunction ([facility]     Spinal stenosis of lumbar region      Past Surgical History:    has a past surgical history that includes refugio and bso (cervix removed) (9/2010); Tubal ligation; Rotator cuff repair (Right); Upper gastrointestinal endoscopy (02/2016); Colonoscopy (04/2013); and pacemaker placement (Left). Social History:  Reviewed. reports that she has never smoked. She has never used smokeless tobacco. She reports that she does not drink alcohol or use drugs. Family History:  Reviewed. family history includes Breast Cancer in her maternal aunt; Cancer in her daughter; High Blood Pressure in her mother; Lung Cancer in her brother; Other in her father; Parkinsonism in her mother; Stroke in her mother. Review of Systems:  · Constitutional: Negative for fever, night sweats, chills, weight changes, or weakness  · Skin: Negative for rash, dry skin, pruritus, bruising, bleeding, blood clots, or changes in skin pigment  · HEENT: Negative for vision changes, ringing in the ears, sore throat, dysphagia, or swollen lymph nodes  · Respiratory: Reviewed in HPI  · Cardiovascular: Reviewed in HPI  · Gastrointestinal: Negative for abdominal pain, N/V/D, constipation, or black/tarry stools  · Genito-Urinary: Negative for dysuria, incontinence, urgency, or hematuria  · Musculoskeletal: Negative for joint swelling, muscle pain, or injuries  · Neurological/Psych: Positive for confusion, dizziness, hallucinations and balance issues. Negative for seizures, headaches, or TIA-like symptoms. No anxiety, depression.     Physical Examination:  Vitals:    08/16/19 0957   BP:    Pulse: 74   Resp:    Temp:    SpO2:       In: 240 [P.O.:240]  Out: 1475    Wt Readings from Last 3 Encounters:   08/15/19 105 lb 2.6 oz (47.7 kg)   07/15/19 108 lb (49 kg)   07/12/19 116 lb (52.6 kg)       Intake/Output Summary (Last 24 hours) at 8/16/2019 1016  Last data filed at 8/16/2019 0846  Gross per 24 hour   Intake 240 ml BILITOT 0.3 2019     Cardiac Enzymes:   Lab Results   Component Value Date    TROPONINI <0.01 2019    TROPONINI <0.01 07/15/2019    TROPONINI <0.01 2019     Coags:   Lab Results   Component Value Date    PROTIME 12.2 2019    INR 1.07 2019     EC19  NSR with 1st degree HB, rate 68, QTc 438    ECHO: 19  Left ventricle - normal size, thickness and function with EF of 70%  Mitral valve - trivial regurgitation  Pacer wire noted in right ventricle and right atrium.   Tricuspid valve - mild regurgitation with RVSP estimated at 36 mmHg.     Problem List:   Patient Active Problem List    Diagnosis Date Noted    Unstable angina ([facility] 2018     Priority: High    Essential hypertension 2016     Priority: High    Orthostatic hypotension 2015     Priority: High    Partial epilepsy with intractable epilepsy ([facility] 2014     Priority: High    COPD (chronic obstructive pulmonary disease) ([facility] 2013     Priority: High    Post traumatic seizure ([facility] 2013     Priority: High    Anemia 2016     Priority: Medium    Hypothyroidism 2015     Priority: Medium    Constipation 2015     Priority: Low    Spinal stenosis of lumbar region 2014     Priority: Low    DDD (degenerative disc disease), lumbar 2013     Priority: Low    Osteopenia 2013     Priority: Low    Severe malnutrition ([facility] 08/15/2019    Visual hallucinations     Late onset Alzheimer's disease with behavioral disturbance 2019    Failure to thrive in adult 2019    Syncope and collapse 2019    Nausea and vomiting 2019    Abnormal EKG 2019    Hypokalemia     Dysarthria 2019    Hypertensive emergency     Hypertensive urgency 2018    Aspiration into airway 10/11/2018    Chest pain 2018    Seizure-like activity (HCC)     Shaking     Partial seizure (HCC)     Mild episode of recurrent major depressive

## 2019-08-20 NOTE — PROGRESS NOTES
[facility]   Electrophysiology Follow up  Date: 8/20/2019    CC: s/p pacemaker implantation   HPI: Abi Penny is a 80 y.o. with history of recurrent syncope on 5/30/2016 and 7/21/16. She also has had recurrent episodes of dizzy spells and lightheadedness. ILR was implanted on 8/11/16 and showed multiple pauses of up to 5 seconds. On 9/29/16, she underwent dual chamber (MRI compatible) pacemaker for sinus node dysfunction. She was hospitalized in October 2017 with C-diff, dizziness, and fatigue. While hospitalized, she had Atrial fib with RVR. She was started on Flecainide 50 mg twice a day. On Xarelto 15 mg daily for stroke prevention. She was admitted to [facility] (1/13/18) with severe sharp chest pain followed by shaking sensation. Cardiac workup was negative including enzymes and EKG. She was seen by interventional cardiology but symptoms thought to be atypical for ACS. [facility] 7/11/2018 showing normal coronaries with no intervention. She was admitted 12/13/18 for a TIA. She had an admission 7/8/19 with 4 episodes of syncope. Orthostatics were positive. She has been hypotensive and is currently taking Midodrine. Mestinon was discontinued due to urinary frequency. She has had significant weight loss and takes Megace. On 7/15/19 she presented to the ED with hypertension, B/P 263/128. Her K+ was 2.9. She was later discharged with medications. On 8/14/19 presented in ED with hypotension, B/P 74/54, although EMS got SBP of 160. She reported multiple episodes of syncope over 2 days. Her medication was adjusted. Florinef was added again. Nitroglycerin patch was discontinued and she was started on hydralazine as needed for SBP > 180 mmHg. Interim history: She has not needed to use Hydralazine for hypertension. She is taking Florinef daily  No syncope.     Past Medical History:   Diagnosis Date    Anemia     Asthma     Blood circulation, collateral     mouth 3 times daily as needed for Dizziness 15 tablet 0    nitroGLYCERIN (NITRODUR) 0.1 MG/HR Place 1 patch onto the skin as needed (for supine Blood pressure systolic > 694 mmHg, when in bed.) 30 patch 3    pantoprazole (PROTONIX) 40 MG tablet Take 1 tablet by mouth every morning (before breakfast) 30 tablet 3    captopril (CAPOTEN) 12.5 MG tablet Take 1 tablet by mouth 2 times daily Hold for bp < 120/70 90 tablet 3    levETIRAcetam (KEPPRA) 750 MG tablet Take 750 mg by mouth 2 times daily       sertraline (ZOLOFT) 100 MG tablet Take 150 mg by mouth daily       aspirin 81 MG tablet Take 81 mg by mouth daily      acetaminophen (TYLENOL) 325 MG tablet Take 650 mg by mouth 2 times daily      Cranberry 1000 MG CAPS Take 2,000 mg by mouth      lactobacillus (CULTURELLE) capsule Take 1 capsule by mouth daily      levothyroxine (SYNTHROID) 50 MCG tablet TAKE 1 TABLET BY MOUTH DAILY 90 tablet 3    megestrol (MEGACE) 40 MG/ML suspension Take 10 mLs by mouth daily 360 mL 6    ferrous sulfate 325 (65 Fe) MG tablet TAKE 1 TABLET BY MOUTH EVERY DAY WITH BREAKFAST (Patient taking differently: TAKE 1 TABLET BY MOUTH EVERY DAY AT LUNCH) 30 tablet 12    levalbuterol (XOPENEX) 0.63 MG/3ML nebulization Take 3 mLs by nebulization every 4 hours as needed for Wheezing 360 mL 5    flecainide (TAMBOCOR) 50 MG tablet Take 1 tablet by mouth every 12 hours 60 tablet 12    vitamin B-12 (CYANOCOBALAMIN) 1000 MCG tablet Take 1 tablet by mouth daily 30 tablet 3    multivitamin (THERAGRAN) per tablet Take 1 tablet by mouth daily. No current facility-administered medications for this visit. Social History:   reports that she has never smoked. She has never used smokeless tobacco. She reports that she does not drink alcohol or use drugs. Family History:  family history includes Breast Cancer in her maternal aunt; Cancer in her daughter; High Blood Pressure in her mother; Lung Cancer in her brother;  Other in her with her and her daughter and they are not interested in. Low AF burden      - Symptomatic bradycardia    Sinus node dysfunction:    S/p pacemaker implantation 9/29/16. I reviewed device interrogation today. Device functions normally. Vpaced minimal    Follow up with device clinic as scheduled. If her B/P goes low, increase Florinef to bid if SB/P drops below 90  Use Hydralazine if B/P goes greater than 180  Follow up 6 months with EP NP    Thank you for allowing me to participate in the care of Destiney Downing. Further evaluation will be based upon the patient's clinical course and testing results. All questions and concerns were addressed to the patient/family. Alternatives to my treatment were discussed. I have discussed the above stated plan and the patient verbalized understanding and agreed with the plan. NOTE: This report was transcribed using voice recognition software. Every effort was made to ensure accuracy, however, inadvertent computerized transcription errors may be present. Sophie Wright MD, MPH  [facility]   Office: [phone]     Physician Attestation: I, Dr. Sophie Wright, confirm that the scribe's documentation has been prepared under my direction and personally reviewed by me in its entirety. I also confirm that the note above accurately reflects all work, treatment, procedures, and medical decision making performed by me.

## 2019-08-23 PROBLEM — H91.93 BILATERAL HEARING LOSS: Status: ACTIVE | Noted: 2019-01-01

## 2019-08-23 PROBLEM — H93.13 SUBJECTIVE TINNITUS OF BOTH EARS: Status: ACTIVE | Noted: 2019-01-01

## 2019-08-27 NOTE — PROGRESS NOTES
Ora Fuller is here today to follow up recent Hospital visit for Hypotension and syncope. Was seen in [facility] on 8/14/19 and discharged on 8/16/19. Saw cards last week, stopped NTG and midodrine, told to use hydralazine prn for high BP and an extra florinef if low. States still getting highs and lows but not bad enough for ED. States this AM was \"rough\", had syncope, daughter was able to stop her before she fell and hurt herself. States she had showered and was finishing getting ready and all of a sudden could feel her pressure drop and told her daughter who cuaght her. Reviewed chart notes, labs and imaging in [facility] and/or [facility]. Body mass index is 20.41 kg/m². Vitals:    08/27/19 1201   BP: (!) 156/88   Site: Left Upper Arm   Position: Sitting   Cuff Size: Small Adult   Pulse: 76   SpO2: 95%   Weight: 108 lb (49 kg)     Wt Readings from Last 3 Encounters:   08/27/19 108 lb (49 kg)   08/23/19 108 lb (49 kg)   08/20/19 108 lb (49 kg)     PHQ score: PHQ-9 Total Score: 0 (10/9/2018  4:28 PM)      GENERAL:Alert and oriented x 4 NAD, affect appropriate and slender, well hydrated, well developed. ASSESSMENT AND PLAN:       Lm Ceron was seen today for follow-up from hospital.    Diagnoses and all orders for this visit:    Orthostatic hypotension    Hypokalemia  -     Basic Metabolic Panel; Future    Late onset Alzheimer's disease with behavioral disturbance    Other orders  -     megestrol (MEGACE) 40 MG/ML suspension; Take 20 mLs by mouth daily    More than 50% of today's 25 minute encounter was spent counseling the patient/coordinating care of the patient regarding hospice. Discussed that she has severe orthostatic hypotension with sever rebound HTN that increases her risk of stroke, syncope and sudden death. Pt has almost monthly visits to the hospital and decreasing quality of life. ember notes declining mental state and every time in hospital seems worse.  Discussed with pt jose just

## 2019-12-03 NOTE — TELEPHONE ENCOUNTER
Yes ok for imodium 1 tab after each loose stool up to 4 tabs daily    Diarrhea/loose stool is not new for her

## 2019-12-03 NOTE — TELEPHONE ENCOUNTER
Abby Alexis from [address] [place] [address] [phone] states patient has diarrhea/loose stool and wanted to know if it was okay to give patient Imodium.         Please advise

## 2020-01-01 ENCOUNTER — TELEPHONE (OUTPATIENT)
Dept: PRIMARY CARE CLINIC | Age: 85
End: 2020-01-01

## 2020-01-01 ENCOUNTER — TELEPHONE (OUTPATIENT)
Dept: CARDIOLOGY CLINIC | Age: 85
End: 2020-01-01

## 2020-01-01 ENCOUNTER — TELEPHONE (OUTPATIENT)
Dept: FAMILY MEDICINE CLINIC | Age: 85
End: 2020-01-01

## 2020-01-01 RX ORDER — LORAZEPAM 0.5 MG/1
TABLET ORAL
Qty: 50 TABLET | Refills: 1 | Status: SHIPPED | OUTPATIENT
Start: 2020-01-01 | End: 2020-02-15

## 2020-01-01 RX ORDER — HYOSCYAMINE SULFATE 0.12 MG/1
0.12 TABLET SUBLINGUAL EVERY 4 HOURS PRN
Qty: 120 EACH | Refills: 1 | Status: SHIPPED | OUTPATIENT
Start: 2020-01-01

## 2020-01-15 NOTE — TELEPHONE ENCOUNTER
Yandel Medina states patient has changed a lot and getting very restless and requesting an order for Levsin 0.125mg q4h prn. Requesting  Lorazepam 0.5mg-1mg q2h prn. Yandel Medina requesting medications be sent to [facility] on [address] . Please advise.

## 2020-01-15 NOTE — TELEPHONE ENCOUNTER
Armando Duarte from [facility] needed some questions answered for this patient. The patient  is starting into the dying process. ..     She can be reached @[phone]

## 2020-01-17 NOTE — TELEPHONE ENCOUNTER
[facility]osp[facility] of [address] [place] [address] called in for verbal order on discontinuing all scheduled medications with the exception of Morphine, Ativan and Levsin. Patient came into inpatient hospice and is currently unresponsive. Verbal order given. Life expectancy 24-48 hours.

## 2020-01-25 NOTE — PROGRESS NOTES
Pt admitted to room 3378 from ED. VSS. Pt A&Ox4. Daughter at bedside. POC updated with pt and daughter, all questions answered. Pt placed in cdiff precaution d/t pt stating she has been having diarrhea at home. Oriented pt to room and call light. Call light and bedside table within reach. Instructed to call out with any needs, v/u. Will monitor. None known

## 2020-02-08 RX ORDER — LEVETIRACETAM 750 MG/1
TABLET ORAL
Qty: 360 TABLET | Refills: 2 | OUTPATIENT
Start: 2020-02-08

## 2021-07-26 NOTE — CARE COORDINATION
cane  Functional Review  Ability to seek help/take action for Emergent/Urgent situations i.e. fire, crime, inclement weather or health crisis. :  Independent  Ability handle personal hygiene needs (bathing/dressing/grooming):  Needs Assistance  Ability to manage medications:  Needs Assistance  Ability to prepare food:  Needs Assistance  Ability to maintain home (clean home, laundry):  Needs Assistance  Ability to drive and/or has transportation:  Dependent  Ability to do shopping:  Needs Assistance  Ability to manage finances:  Needs Assistance  Is patient able to live independently?:  No  Hearing and Vision  Visual Impairment:  Reading glasses  Hearing Impairment:  None  Care Transitions Interventions  No Identified Needs       Plan to return home with  [facility] and [facility]. Agreed to CTC f/u calls after d/c. Follow Up  Future Appointments   Date Time Provider Vikki Alcala   8/16/2019  3:15 PM Deborah Henriquez MD [facility]   8/20/2019  2:30 PM SCHEDULE, Artie DEVICE CHECK  Card[facility]   8/20/2019  2:30 PM Casimiro Colby MD  Cardio [facility]   11/20/2019  2:10 PM Lonnie Reyes MD  NEURO [facility]   12/12/2019  1:45 PM YOUSUF Hsieh - CNP  Card[facility]       Health Maintenance  There are no preventive care reminders to display for this patient.     Bertha Lam RN
no known allergies

## 2021-11-18 NOTE — PROGRESS NOTES
Awaiting confirmation of prescriptions being filled at [address] [place]. Complaining of right achiles tendon pain. MD notified.  Electronically signed by Sejal Booth RN on 7/9/2019 at 2:28 PM

## 2024-08-05 NOTE — PLAN OF CARE
Problem: Falls - Risk of:  Goal: Will remain free from falls  Description  Will remain free from falls  7/9/2019 1755 by Umesh Sanchez RN  Outcome: Met This Shift  Note:   Has remained free of falls. SBA x 1 with walker. Calls out appropriately. Bed alarm on. Problem: Cardiac:  Goal: Ability to maintain vital signs within normal range will improve  Description  Ability to maintain vital signs within normal range will improve  7/9/2019 1755 by Umesh Sanchez RN  Outcome: Ongoing  Note:   BP elevated. Hydralazine and IV labetalol given. Will continue to monitor. Problem: Fluid Volume:  Goal: Will show no signs and symptoms of electrolyte imbalance  Description  Will show no signs and symptoms of electrolyte imbalance  7/9/2019 1755 by Umesh Sanchez RN  Outcome: Ongoing  Note:   IV potassium replaced. Will recheck K at 6 pm     Problem: Pain:  Goal: Pain level will decrease  Description  Pain level will decrease  7/9/2019 1755 by Umesh Sanchez RN  Outcome: Ongoing  Note:   C/O HA when BP is elevated. In process of lowering BP. Problem: Musculor/Skeletal Functional Status  Goal: Highest potential functional level  Note:   Has pain in achiles tendon on right leg. MD notified.
Problem: Falls - Risk of:  Goal: Will remain free from falls  Description  Will remain free from falls  Outcome: Ongoing  Note:   HIGH fall risk. Pt is aware of needs, rings for assistance when needed - SBA with walker with ambulation short distance, otherwise wheelchair. SAFE to door, armband inplace, fall blanket on bed, siderails up x2, bed in lowest position and wheels locked. Callbell in reach, nonskid socks on and bed alarm on. Problem: Falls - Risk of:  Goal: Absence of physical injury  Description  Absence of physical injury  Outcome: Ongoing     Problem: Cardiac:  Goal: Ability to maintain vital signs within normal range will improve  Description  Ability to maintain vital signs within normal range will improve  Outcome: Ongoing  Note:   Monitoring orthostatic BP's every shift.        Problem: Cardiac:  Goal: Cardiovascular alteration will improve  Description  Cardiovascular alteration will improve  Outcome: Ongoing     Problem: Health Behavior:  Goal: Identification of resources available to assist in meeting health care needs will improve  Description  Identification of resources available to assist in meeting health care needs will improve  Outcome: Ongoing     Problem: Physical Regulation:  Goal: Complications related to the disease process, condition or treatment will be avoided or minimized  Description  Complications related to the disease process, condition or treatment will be avoided or minimized  Outcome: Ongoing
Problem: Falls - Risk of:  Goal: Will remain free from falls  Description  Will remain free from falls  Outcome: Ongoing  Note:   Pt remains free from falls. Safety precautions in place. Patient up with walker and stand by assist. Bed in lowest position, bed wheels locked, call light with in reach, bed alarm on, yellow blanket in place, fall risk wrist band on, SAFE outside of doorway. Will continue to monitor. Problem: Cardiac:  Goal: Ability to maintain vital signs within normal range will improve  Description  Ability to maintain vital signs within normal range will improve  Outcome: Ongoing  Note:   BP was stable most of shift. This evening BP noted to be 210/108. Labetolol given as ordered and BP came down nicely to 152/78. Will continue to monitor cardiac status. Problem: Nutrition  Goal: Optimal nutrition therapy  7/11/2019 2029 by Victorino Rothman RN  Outcome: Ongoing  Note:   Patient ate half of magic cup at lunch and drank an ensure clear at dinner. Patient ate almost 100 % of lunch and 75 % of dinner. Patient having much better appetite.
Quality 130: Documentation Of Current Medications In The Medical Record: Current Medications Documented
Quality 431: Preventive Care And Screening: Unhealthy Alcohol Use - Screening: Patient not identified as an unhealthy alcohol user when screened for unhealthy alcohol use using a systematic screening method
Quality 358: Patient-Centered Surgical Risk Assessment And Communication: Documentation of patient-specific risk assessment with a risk calculator based on multi-institutional clinical data, the specific risk calculator used, and communication of risk assessment from risk calculator with the patient or family.
Quality 47: Advance Care Plan: Advance Care Planning discussed and documented; advance care plan or surrogate decision maker documented in the medical record.
Quality 226: Preventive Care And Screening: Tobacco Use: Screening And Cessation Intervention: Patient screened for tobacco use, is a smoker AND received Cessation Counseling within measurement period or in the six months prior to the measurement period
Detail Level: Detailed